# Patient Record
Sex: FEMALE | Race: BLACK OR AFRICAN AMERICAN | NOT HISPANIC OR LATINO | URBAN - METROPOLITAN AREA
[De-identification: names, ages, dates, MRNs, and addresses within clinical notes are randomized per-mention and may not be internally consistent; named-entity substitution may affect disease eponyms.]

---

## 2024-03-01 ENCOUNTER — INPATIENT (INPATIENT)
Facility: HOSPITAL | Age: 64
LOS: 2 days | Discharge: NURSING, REHAB OR LONG-TERM CARE | DRG: 64 | End: 2024-03-04
Attending: HOSPITALIST | Admitting: HOSPITALIST
Payer: MEDICAID

## 2024-03-01 ENCOUNTER — OUTPATIENT (OUTPATIENT)
Dept: OUTPATIENT SERVICES | Facility: HOSPITAL | Age: 64
LOS: 1 days | End: 2024-03-01
Payer: COMMERCIAL

## 2024-03-01 VITALS
WEIGHT: 93.7 LBS | RESPIRATION RATE: 18 BRPM | SYSTOLIC BLOOD PRESSURE: 145 MMHG | DIASTOLIC BLOOD PRESSURE: 74 MMHG | HEART RATE: 76 BPM | HEIGHT: 59 IN | TEMPERATURE: 98 F | OXYGEN SATURATION: 99 %

## 2024-03-01 DIAGNOSIS — I63.9 CEREBRAL INFARCTION, UNSPECIFIED: ICD-10-CM

## 2024-03-01 LAB
ALBUMIN SERPL ELPH-MCNC: 4.4 G/DL — SIGNIFICANT CHANGE UP (ref 3.3–5)
ALP SERPL-CCNC: 44 U/L — SIGNIFICANT CHANGE UP (ref 30–120)
ALT FLD-CCNC: 24 U/L — SIGNIFICANT CHANGE UP (ref 10–60)
ANION GAP SERPL CALC-SCNC: 9 MMOL/L — SIGNIFICANT CHANGE UP (ref 5–17)
APTT BLD: 34.8 SEC — SIGNIFICANT CHANGE UP (ref 24.5–35.6)
AST SERPL-CCNC: 17 U/L — SIGNIFICANT CHANGE UP (ref 10–40)
BASOPHILS # BLD AUTO: 0.04 K/UL — SIGNIFICANT CHANGE UP (ref 0–0.2)
BASOPHILS NFR BLD AUTO: 0.4 % — SIGNIFICANT CHANGE UP (ref 0–2)
BILIRUB SERPL-MCNC: 0.8 MG/DL — SIGNIFICANT CHANGE UP (ref 0.2–1.2)
BUN SERPL-MCNC: 15 MG/DL — SIGNIFICANT CHANGE UP (ref 7–23)
CALCIUM SERPL-MCNC: 10.1 MG/DL — SIGNIFICANT CHANGE UP (ref 8.4–10.5)
CHLORIDE SERPL-SCNC: 100 MMOL/L — SIGNIFICANT CHANGE UP (ref 96–108)
CO2 SERPL-SCNC: 28 MMOL/L — SIGNIFICANT CHANGE UP (ref 22–31)
CREAT SERPL-MCNC: 0.86 MG/DL — SIGNIFICANT CHANGE UP (ref 0.5–1.3)
EGFR: 76 ML/MIN/1.73M2 — SIGNIFICANT CHANGE UP
EOSINOPHIL # BLD AUTO: 0.04 K/UL — SIGNIFICANT CHANGE UP (ref 0–0.5)
EOSINOPHIL NFR BLD AUTO: 0.4 % — SIGNIFICANT CHANGE UP (ref 0–6)
GLUCOSE BLDC GLUCOMTR-MCNC: 101 MG/DL — HIGH (ref 70–99)
GLUCOSE BLDC GLUCOMTR-MCNC: 86 MG/DL — SIGNIFICANT CHANGE UP (ref 70–99)
GLUCOSE BLDC GLUCOMTR-MCNC: 93 MG/DL — SIGNIFICANT CHANGE UP (ref 70–99)
GLUCOSE BLDC GLUCOMTR-MCNC: 97 MG/DL — SIGNIFICANT CHANGE UP (ref 70–99)
GLUCOSE SERPL-MCNC: 158 MG/DL — HIGH (ref 70–99)
HCT VFR BLD CALC: 36.5 % — SIGNIFICANT CHANGE UP (ref 34.5–45)
HGB BLD-MCNC: 11.9 G/DL — SIGNIFICANT CHANGE UP (ref 11.5–15.5)
IMM GRANULOCYTES NFR BLD AUTO: 0.3 % — SIGNIFICANT CHANGE UP (ref 0–0.9)
INR BLD: 1.07 RATIO — SIGNIFICANT CHANGE UP (ref 0.85–1.18)
LYMPHOCYTES # BLD AUTO: 27.4 % — SIGNIFICANT CHANGE UP (ref 13–44)
LYMPHOCYTES # BLD AUTO: 3.1 K/UL — SIGNIFICANT CHANGE UP (ref 1–3.3)
MCHC RBC-ENTMCNC: 29.2 PG — SIGNIFICANT CHANGE UP (ref 27–34)
MCHC RBC-ENTMCNC: 32.6 GM/DL — SIGNIFICANT CHANGE UP (ref 32–36)
MCV RBC AUTO: 89.7 FL — SIGNIFICANT CHANGE UP (ref 80–100)
MONOCYTES # BLD AUTO: 0.62 K/UL — SIGNIFICANT CHANGE UP (ref 0–0.9)
MONOCYTES NFR BLD AUTO: 5.5 % — SIGNIFICANT CHANGE UP (ref 2–14)
NEUTROPHILS # BLD AUTO: 7.49 K/UL — HIGH (ref 1.8–7.4)
NEUTROPHILS NFR BLD AUTO: 66 % — SIGNIFICANT CHANGE UP (ref 43–77)
NRBC # BLD: 0 /100 WBCS — SIGNIFICANT CHANGE UP (ref 0–0)
PLATELET # BLD AUTO: 265 K/UL — SIGNIFICANT CHANGE UP (ref 150–400)
POTASSIUM SERPL-MCNC: 3.7 MMOL/L — SIGNIFICANT CHANGE UP (ref 3.5–5.3)
POTASSIUM SERPL-SCNC: 3.7 MMOL/L — SIGNIFICANT CHANGE UP (ref 3.5–5.3)
PROT SERPL-MCNC: 7.9 G/DL — SIGNIFICANT CHANGE UP (ref 6–8.3)
PROTHROM AB SERPL-ACNC: 11.6 SEC — SIGNIFICANT CHANGE UP (ref 9.5–13)
RBC # BLD: 4.07 M/UL — SIGNIFICANT CHANGE UP (ref 3.8–5.2)
RBC # FLD: 12 % — SIGNIFICANT CHANGE UP (ref 10.3–14.5)
SODIUM SERPL-SCNC: 137 MMOL/L — SIGNIFICANT CHANGE UP (ref 135–145)
TROPONIN I, HIGH SENSITIVITY RESULT: 7.2 NG/L — SIGNIFICANT CHANGE UP
WBC # BLD: 11.32 K/UL — HIGH (ref 3.8–10.5)
WBC # FLD AUTO: 11.32 K/UL — HIGH (ref 3.8–10.5)

## 2024-03-01 PROCEDURE — 99285 EMERGENCY DEPT VISIT HI MDM: CPT

## 2024-03-01 PROCEDURE — 70551 MRI BRAIN STEM W/O DYE: CPT

## 2024-03-01 PROCEDURE — 70551 MRI BRAIN STEM W/O DYE: CPT | Mod: 26

## 2024-03-01 PROCEDURE — 70498 CT ANGIOGRAPHY NECK: CPT | Mod: 26,MC

## 2024-03-01 PROCEDURE — 99223 1ST HOSP IP/OBS HIGH 75: CPT

## 2024-03-01 PROCEDURE — 93306 TTE W/DOPPLER COMPLETE: CPT | Mod: 26

## 2024-03-01 PROCEDURE — 93010 ELECTROCARDIOGRAM REPORT: CPT

## 2024-03-01 PROCEDURE — 70496 CT ANGIOGRAPHY HEAD: CPT | Mod: 26,MC

## 2024-03-01 PROCEDURE — 70450 CT HEAD/BRAIN W/O DYE: CPT | Mod: 26,59,MC

## 2024-03-01 PROCEDURE — 71045 X-RAY EXAM CHEST 1 VIEW: CPT | Mod: 26

## 2024-03-01 RX ORDER — ATORVASTATIN CALCIUM 80 MG/1
40 TABLET, FILM COATED ORAL AT BEDTIME
Refills: 0 | Status: DISCONTINUED | OUTPATIENT
Start: 2024-03-01 | End: 2024-03-01

## 2024-03-01 RX ORDER — ONDANSETRON 8 MG/1
4 TABLET, FILM COATED ORAL EVERY 8 HOURS
Refills: 0 | Status: DISCONTINUED | OUTPATIENT
Start: 2024-03-01 | End: 2024-03-04

## 2024-03-01 RX ORDER — CLOPIDOGREL BISULFATE 75 MG/1
75 TABLET, FILM COATED ORAL DAILY
Refills: 0 | Status: DISCONTINUED | OUTPATIENT
Start: 2024-03-01 | End: 2024-03-04

## 2024-03-01 RX ORDER — ACETAMINOPHEN 500 MG
650 TABLET ORAL EVERY 6 HOURS
Refills: 0 | Status: DISCONTINUED | OUTPATIENT
Start: 2024-03-01 | End: 2024-03-04

## 2024-03-01 RX ORDER — DEXTROSE 50 % IN WATER 50 %
15 SYRINGE (ML) INTRAVENOUS ONCE
Refills: 0 | Status: DISCONTINUED | OUTPATIENT
Start: 2024-03-01 | End: 2024-03-04

## 2024-03-01 RX ORDER — SODIUM CHLORIDE 9 MG/ML
1000 INJECTION, SOLUTION INTRAVENOUS
Refills: 0 | Status: DISCONTINUED | OUTPATIENT
Start: 2024-03-01 | End: 2024-03-02

## 2024-03-01 RX ORDER — INSULIN LISPRO 100/ML
VIAL (ML) SUBCUTANEOUS
Refills: 0 | Status: DISCONTINUED | OUTPATIENT
Start: 2024-03-01 | End: 2024-03-04

## 2024-03-01 RX ORDER — DEXTROSE 50 % IN WATER 50 %
25 SYRINGE (ML) INTRAVENOUS ONCE
Refills: 0 | Status: DISCONTINUED | OUTPATIENT
Start: 2024-03-01 | End: 2024-03-04

## 2024-03-01 RX ORDER — GLUCAGON INJECTION, SOLUTION 0.5 MG/.1ML
1 INJECTION, SOLUTION SUBCUTANEOUS ONCE
Refills: 0 | Status: DISCONTINUED | OUTPATIENT
Start: 2024-03-01 | End: 2024-03-04

## 2024-03-01 RX ORDER — ENOXAPARIN SODIUM 100 MG/ML
40 INJECTION SUBCUTANEOUS EVERY 24 HOURS
Refills: 0 | Status: DISCONTINUED | OUTPATIENT
Start: 2024-03-01 | End: 2024-03-04

## 2024-03-01 RX ORDER — LANOLIN ALCOHOL/MO/W.PET/CERES
3 CREAM (GRAM) TOPICAL AT BEDTIME
Refills: 0 | Status: DISCONTINUED | OUTPATIENT
Start: 2024-03-01 | End: 2024-03-04

## 2024-03-01 RX ORDER — ATORVASTATIN CALCIUM 80 MG/1
80 TABLET, FILM COATED ORAL AT BEDTIME
Refills: 0 | Status: DISCONTINUED | OUTPATIENT
Start: 2024-03-01 | End: 2024-03-01

## 2024-03-01 RX ORDER — ASPIRIN/CALCIUM CARB/MAGNESIUM 324 MG
81 TABLET ORAL DAILY
Refills: 0 | Status: DISCONTINUED | OUTPATIENT
Start: 2024-03-01 | End: 2024-03-04

## 2024-03-01 RX ORDER — ATORVASTATIN CALCIUM 80 MG/1
80 TABLET, FILM COATED ORAL AT BEDTIME
Refills: 0 | Status: DISCONTINUED | OUTPATIENT
Start: 2024-03-01 | End: 2024-03-04

## 2024-03-01 RX ADMIN — SODIUM CHLORIDE 50 MILLILITER(S): 9 INJECTION, SOLUTION INTRAVENOUS at 21:00

## 2024-03-01 RX ADMIN — ENOXAPARIN SODIUM 40 MILLIGRAM(S): 100 INJECTION SUBCUTANEOUS at 20:30

## 2024-03-01 NOTE — ED ADULT NURSE NOTE - ALCOHOL PRE SCREEN (AUDIT - C)
Statement Selected
labs and imaging reviewed: spoke with Dr. Segundo   patient currently on BIPAP 02 saturation at 96%, stable at this time  patient to be admitted to medicine under Dr. Segundo  discussed with Dr. Griffin

## 2024-03-01 NOTE — ED ADULT NURSE NOTE - FINAL NURSING ELECTRONIC SIGNATURE
Quality 265: Biopsy Follow-Up: Biopsy results reviewed, communicated, tracked, and documented
Quality 110: Preventive Care And Screening: Influenza Immunization: Influenza Immunization not Administered because Patient Refused.
Detail Level: Detailed
01-Mar-2024 08:39

## 2024-03-01 NOTE — PATIENT PROFILE ADULT - FALL HARM RISK - HARM RISK INTERVENTIONS
Assistance with ambulation/Assistance OOB with selected safe patient handling equipment/Communicate Risk of Fall with Harm to all staff/Discuss with provider need for PT consult/Monitor gait and stability/Reinforce activity limits and safety measures with patient and family/Tailored Fall Risk Interventions/Visual Cue: Yellow wristband and red socks/Bed in lowest position, wheels locked, appropriate side rails in place/Call bell, personal items and telephone in reach/Instruct patient to call for assistance before getting out of bed or chair/Non-slip footwear when patient is out of bed/[place] to call system/Physically safe environment - no spills, clutter or unnecessary equipment/Purposeful Proactive Rounding/Room/bathroom lighting operational, light cord in reach

## 2024-03-01 NOTE — ED PROVIDER NOTE - CLINICAL SUMMARY MEDICAL DECISION MAKING FREE TEXT BOX
Patient presents to emergency department after a fall at home.  By history patient had likely CVA 5 days ago.  No medical care rendered at that time or since.  Patient has no obvious sign of injury but does show signs of neurologic weakness on the right side with an expressive aphasia.  Will get CAT scans and labs.

## 2024-03-01 NOTE — DISCHARGE NOTE NURSING/CASE MANAGEMENT/SOCIAL WORK - PATIENT PORTAL LINK FT
You can access the FollowMyHealth Patient Portal offered by [facility] by registering at the following website: [email or web address]. By joining [facility]’s FollowMyHealth portal, you will also be able to view your health information using other applications (apps) compatible with our system.

## 2024-03-01 NOTE — ED ADULT TRIAGE NOTE - GLASGOW COMA SCALE: BEST VERBAL RESPONSE, MLM
Person calling (if not the patient, is medical info able to be given?): Valeria-patient     Callback phone number: [phone]    Permission to leave detailed message:no    Detailed reason for call: Patient called back requesting to speak to Toma woodward. Patient wants to know if she can take over the counter medication for urinary track infection.   
Person calling Valeria-patient  Callback phone number work [phone]  Permission to leave detailed message: call back info  Detailed reason for call: Patient recently had chemo -steroids-blood sugar got high-she has a infection-can something be called in? She uses [facility]-  
Pt states has no burning \"yet\" but does have urinary frequency with very little urine.  DR. Santacruz is aware and orders sent to [facility].  Pt made aware that she needs to leave a urine sample before starting any   Antibiotics.  Pt agreed and states that she works until 2200 tonite and will leave urine sample in am and then start Macrobid.  Instructed pt to call with any questions or concerns, pt agreed.  
(V2) incomprehensible speech

## 2024-03-01 NOTE — CARE COORDINATION ASSESSMENT. - NSPASTMEDSURGHISTORY_GEN_ALL_CORE_FT
PAST MEDICAL & SURGICAL HISTORY:  CVA (cerebrovascular accident)      DM (diabetes mellitus)      HTN (hypertension)      No significant past surgical history

## 2024-03-01 NOTE — ED PROVIDER NOTE - OBJECTIVE STATEMENT
Patient brought into the hospital after a fall at home.  According to patient's partner the patient became less communicative 5 days ago.  Patient initially had a period of staring and was nonverbal 5 days ago.  After that she started to respond with single word answers.  Partner also noted a right facial droop.  Partner states that yesterday she did notice some right arm weakness as well.  This morning partner heard a fall and found her lying next to the bed.  No injury noted though partner feels she is using her right arm less than she was before.  Patient was not taken to the hospital 5 days ago when her symptoms started as patient reportedly had a bad experience at the hospital before and refused to go. Patient had a CVA about 4-5 months ago, but partner says she had no residual deficits

## 2024-03-01 NOTE — ED PROVIDER NOTE - CPE EDP SKIN NORM
----- Message from FLORESITA Paz sent at 6/13/2022  8:24 AM CDT -----  Looks like patient is covered w/ bactrim and has f/u w/ urology this week.   ----- Message -----  From: Tj De Dios MD  Sent: 6/10/2022   1:23 PM CDT  To: FLORESITA Paz    Resistant to [employer]. Please address  ----- Message -----  From: Lab, Background User  Sent: 6/8/2022   9:50 PM CDT  To: Tj De Dios MD         normal...

## 2024-03-01 NOTE — H&P ADULT - NSHPPHYSICALEXAM_GEN_ALL_CORE
Appears weak, frail  PERRLA   S1, S2 present, no obvious murmurs or rubs  Lungs CTA BL  R sided facial droop and right hemiparesis  Opens eyes to verbal stimuli otherwise not conversational   No LE edema

## 2024-03-01 NOTE — ED PROVIDER NOTE - PROGRESS NOTE DETAILS
Discussed with Dr. Enrique (attending neurologist) he recommends admission for MRI Discussed with Dr. Thayer (attending hospitalist) she will admit pt

## 2024-03-01 NOTE — ED PROVIDER NOTE - NSTIMEPROVIDERCAREINITIATE_GEN_ER
"Cc:  Postop TLH    HPI:  Pt had uncomplicated TLH on 9/30/20.  Pt doing well and denies any c/o currently.  Pt happy since hyst.  Pt has not been sexually active yet.    Vitals:    11/13/20 1448   Weight: 89.8 kg (198 lb)   Height: 5' 3" (1.6 m)     Abd:  Soft NTND, trocar sites fully healed  Pelvic:  Vaginal mucosa:  Pink, normal physiologic d/c; cuff well healed; no pain with bimanual    Assessment/Plan  Postop check - pt may resume full activity without restrictions    "
Asthma exacerbation
01-Mar-2024 05:40

## 2024-03-01 NOTE — CARE COORDINATION ASSESSMENT. - NSCAREPROVIDERS_GEN_ALL_CORE_FT
CARE PROVIDERS:  Accepting Physician: AMBROSIO Thayer  Access Services: Nadiya Basurto  Administration: Charla Fernando  Administration: Darnell Spivey  Administration: Zoey Means  Admitting: AMBROSIO Thayer  Attending: AMBROSIO Thayer  Case Management: Santaromana, Anna  ED Attending: Siva Rudolph  ED Nurse: Derick Barnes  Emergency Medicine: Jean Marie Chao  Emergency Medicine: Stephanie Vinson  Nurse: Stevie Vaughn  Nurse: Traci Milan  Nurse: Derick Barnes  Nurse: Jovita Armas  Nurse: Bibiana Gallardo  Nurse: Valentino, Samantha  Occupational Therapy: Cherie Coffman  Outpatient Provider: David Enrique  Override: Jovita Armas  Override: Valentino, Samantha  Physical Therapy: Nicole Mccullough  Physical Therapy: Tab Carias  Quality Review: Rae Echevarria  Radiology Technician: Jessica Hernandez  Registered Dietitian: Silvina Connelly  : Sj Reyes  Team: CAITLYN CORTEZ Hospitalists, Team  UR// Supp. Assoc.: Mitra Donahue  UR// Supp. Assoc.: Ash Landaverde

## 2024-03-01 NOTE — CONSULT NOTE ADULT - SUBJECTIVE AND OBJECTIVE BOX
Clinical impression is most likely left hemispheric cerebrovascular accident.    telemetry evaluation to rule out underlying arrhythmia.  echocardiogram to evaluate ejection fraction.  MRI brain.  I would recommend to check TSH,lipid panel  and A1C  Avoid hypotension, allow permissive hypertension, keep SBP above 150 and below 190 if possible   Head of bed is optimally elevated to 15 degrees.  Would recommend to avoid half normal and D5 fluids, would recommend if necessary use only normal saline.   Physical Therapy and Occupational Therapy.  I would recommend fall precautions.  Aspirin 81 PO (ASA 300MG AK daily if patient fails dysphagia screen) and clopidogrel 75mg for 3 months, followed by ASA monotherapy (subject to change based on mechanism and test findings)   Atorvastatin 80MG QHS, titrate to LDL<70aspirin 81 mg once a day  Speech and Swallow evaluation.  Fall precautions.  spoke to HCP Connie

## 2024-03-01 NOTE — ED ADULT NURSE NOTE - ED STAT RN HAND OFF
SUBJECTIVE:  Kaycee is a  50 year old   female who presents today for annual checkup..      PAST MEDICAL HISTORY:    Past Medical History:   Diagnosis Date   • NO HX OF     CA, HTN, DM, CAD, CVA, DVT, liver disease, migraine   • Panic disorder without agoraphobia        PAST SURGICAL HISTORY:   Past Surgical History:   Procedure Laterality Date   • Past surgical history      No operations       SOCIAL HISTORY:   Social History     Tobacco Use   • Smoking status: Never Smoker   • Smokeless tobacco: Never Used   Substance Use Topics   • Alcohol use: Yes     Comment: rarely       FAMILY HISTORY:    Family History   Problem Relation Age of Onset   • Diabetes Maternal Grandmother    • Heart Maternal Grandmother    • Diabetes Paternal Grandfather    • Stroke Maternal Grandfather    • Asthma Mother    • Hypertension Father        MEDICATIONS:   Current Outpatient Medications   Medication Sig Dispense Refill   • norethindrone-ethinyl estradiol (Alyacen ) 1-35 MG-MCG per tablet Take 1 tablet by mouth daily. 84 tablet 4   • fluoxetine (PROZAC) 40 MG capsule Take 40 mg by mouth daily.     • Loratadine (CLARITIN PO)        No current facility-administered medications for this visit.       ALLERGIES:    Allergies as of 2021   • (No Known Allergies)       OBSTETRIC/GYNECOLOGIC HISTORY:   OB History    Para Term  AB Living   0 0 0 0 0 0   SAB TAB Ectopic Molar Multiple Live Births   0 0 0 0 0 0    Patient's last menstrual period was 2021. Menstrual history includes regular. Patient Denies history of sexually transmitted diseases; has no history of abnormal PAP.  Present form of contraception is birth control pills          OBJECTIVE:  Visit Vitals  /80 (BP Location: LUE - Left upper extremity, Patient Position: Sitting, Cuff Size: Regular)   Pulse 80   Ht 5' 5\" (1.651 m)   Wt 78.2 kg (172 lb 6.4 oz)   LMP 2021   BMI 28.69 kg/m²     NECK: Supple  LUNGS: clear to  auscultation  HEART: Regular rate, regular rhythm  BREASTS: no dominant or suspicious mass  ABDOMEN: Benign, Soft, flat, non-tender, No masses, organomegaly  EXTREMITIES: Normal  SKIN:Normal    PELVIC EXAM:Performed with medium katerine.  Vulva: no lesions or masses noted and no erythema or discharge  Vagina:normal size & caliber  Cervix:Normal in appearance, no lesions or masses and no discharge or bleeding. No cervical motion tenderness  Uterus: firm, non-tender, normal size, normal shape  Adnexa:non-tender, no fullness noted and no masses noted  RECTAL: sphincter intact    ASSESSMENT  > Essentially normal well woman exam.    PLAN  > Pap not done  > Reinforced Self-Breast Exam.  > Will return to clinic in 1 year and will call with any questions/concerns prior to that time.  > refill birth control pills  > Cologuard   Handoff

## 2024-03-01 NOTE — ED ADULT NURSE NOTE - OBJECTIVE STATEMENT
63yr old female BIB significant other for stroke that happened on Sunday; pt recently diagnosed with Stroke in November; was seen In [facility]; pt fell yesterday around 2230; unwitnessed; significant other heard a "thump" and found patient on the floor in the bedroom; pt was on the bed prior to the fall; pt able to follow some commands while unable to follow other commands; pt has not been verbal since her arrival to ED; s/o feels pt symptoms of right arm weakness has worsened since Sunday; Pt was still able to ambulate at home as per s/o who is also her proxy; no written proxy form presented; unable to assess pts orientation

## 2024-03-01 NOTE — DISCHARGE NOTE NURSING/CASE MANAGEMENT/SOCIAL WORK - NSDCPEFALRISK_GEN_ALL_CORE
For information on Fall & Injury Prevention, visit: [email or web address] OR  [email or web address] OR  https://www.cdc.gov/steadi/patient.html

## 2024-03-01 NOTE — ED ADULT NURSE NOTE - NSFALLHARMRISKINTERV_ED_ALL_ED
Assistance OOB with selected safe patient handling equipment if applicable/Assistance with ambulation/Communicate risk of Fall with Harm to all staff, patient, and family/Monitor gait and stability/Provide visual cue: red socks, yellow wristband, yellow gown, etc/Reinforce activity limits and safety measures with patient and family/Bed in lowest position, wheels locked, appropriate side rails in place/Call bell, personal items and telephone in reach/Instruct patient to call for assistance before getting out of bed/chair/stretcher/Non-slip footwear applied when patient is off stretcher/[place] to call system/Physically safe environment - no spills, clutter or unnecessary equipment/Purposeful Proactive Rounding/Room/bathroom lighting operational, light cord in reach

## 2024-03-01 NOTE — H&P ADULT - ASSESSMENT
63F with HTN, HLD, DM2, CVA (10/2023) presents with right sided facial droop, r hemiparesis, aphasia that started 5 days ago and fall     1. Left MCA distribution acute CVA  MRI brain demonstrates multiple small acute infarctions in the left MCA distribution and chronic ischemic changes from prior strokes.   CTA head/neck: No LVO occlusion   Plan:  ASA 81mg and plavix 75mg x3 months followed by monotherapy   Atorvastatin 80mg   Telemetry monitoring, 2D echo, lipid panel, AIC, TSH, speech and swallow eval, PT/OT eval   Permissive hypertension   Glycemic control goal Bs <180mg/dL  Aspiration, fall precautions     2. DM2  Hold home metformin 500mg BID  AIC, correction scale insulin   Depending on insulin need and if able to tolerato, coverage can be started accordingly     3. HTN   Hold home lisinopril 20mg, allow permissive hypertension <190mmHg     4. HLD   Statin       VTE ppx: Lovenox     Time spent 55mins reviewing notes, labs data/ imaging , discussion with multidisciplinary team.

## 2024-03-01 NOTE — H&P ADULT - HISTORY OF PRESENT ILLNESS
63F with HTN, HLD, DM2, CVA (10/2023) without residual neuro deficits presents following a fall at home.    According to the partner, Connie the patient was in her usual state of health up until 5 days ago.   On Sunday, pt was noted to have right sided facial, arm and leg weakness, aphasia and episodes of confusion. She took ASA 81mg that she takes on a regular basis.   Symptoms improved and pt was able to ambulate and function at home but felt weak.   Yesterday, the patient fell prompting her partner to bring her to the ED.   On arrival, pt noted to have dense right sided facial droop and right sided hemiparesis and aphasia.   NIHSS 8

## 2024-03-01 NOTE — CARE COORDINATION ASSESSMENT. - ASSESSMENT CONCERNS TO BE ADDRESSED
Pt is a 63 year old female who was BIB her s/o for stroke like symtpoms.  Pt lives home with s/o.  S/O at bedside.  She reports that patient had a stroke in Nov 2023 and then she thinks patient had one on Sunday because she had slurred speech and facial drooping, however they did not go to hospital at that time because patient had a bad experience during last admission.  Pt then fell last night and s/o brought her to [facility] because she was concerned she hurt herself and patient could not say what happened.  S/O is HCP.  PCP is Dr Radha Allen in [place] and pharmact is [facility] in [place].  2/3 steps to enter home and 13 steps inside.  SW to follow for PTE and needs.  S/O provided with acute and ERICH lists./care coordination

## 2024-03-02 LAB
A1C WITH ESTIMATED AVERAGE GLUCOSE RESULT: 5.5 % — SIGNIFICANT CHANGE UP (ref 4–5.6)
ANION GAP SERPL CALC-SCNC: 12 MMOL/L — SIGNIFICANT CHANGE UP (ref 5–17)
BUN SERPL-MCNC: 10 MG/DL — SIGNIFICANT CHANGE UP (ref 7–23)
CALCIUM SERPL-MCNC: 9.5 MG/DL — SIGNIFICANT CHANGE UP (ref 8.4–10.5)
CHLORIDE SERPL-SCNC: 103 MMOL/L — SIGNIFICANT CHANGE UP (ref 96–108)
CHOLEST SERPL-MCNC: 165 MG/DL — SIGNIFICANT CHANGE UP
CO2 SERPL-SCNC: 25 MMOL/L — SIGNIFICANT CHANGE UP (ref 22–31)
CREAT SERPL-MCNC: 0.69 MG/DL — SIGNIFICANT CHANGE UP (ref 0.5–1.3)
EGFR: 97 ML/MIN/1.73M2 — SIGNIFICANT CHANGE UP
ESTIMATED AVERAGE GLUCOSE: 111 MG/DL — SIGNIFICANT CHANGE UP (ref 68–114)
GLUCOSE BLDC GLUCOMTR-MCNC: 102 MG/DL — HIGH (ref 70–99)
GLUCOSE BLDC GLUCOMTR-MCNC: 102 MG/DL — HIGH (ref 70–99)
GLUCOSE BLDC GLUCOMTR-MCNC: 114 MG/DL — HIGH (ref 70–99)
GLUCOSE BLDC GLUCOMTR-MCNC: 131 MG/DL — HIGH (ref 70–99)
GLUCOSE SERPL-MCNC: 104 MG/DL — HIGH (ref 70–99)
HCT VFR BLD CALC: 37.7 % — SIGNIFICANT CHANGE UP (ref 34.5–45)
HCV AB S/CO SERPL IA: 0.09 S/CO — SIGNIFICANT CHANGE UP (ref 0–0.99)
HCV AB SERPL-IMP: SIGNIFICANT CHANGE UP
HDLC SERPL-MCNC: 54 MG/DL — SIGNIFICANT CHANGE UP
HGB BLD-MCNC: 12.1 G/DL — SIGNIFICANT CHANGE UP (ref 11.5–15.5)
LIPID PNL WITH DIRECT LDL SERPL: 93 MG/DL — SIGNIFICANT CHANGE UP
MCHC RBC-ENTMCNC: 28.5 PG — SIGNIFICANT CHANGE UP (ref 27–34)
MCHC RBC-ENTMCNC: 32.1 GM/DL — SIGNIFICANT CHANGE UP (ref 32–36)
MCV RBC AUTO: 88.9 FL — SIGNIFICANT CHANGE UP (ref 80–100)
NON HDL CHOLESTEROL: 110 MG/DL — SIGNIFICANT CHANGE UP
NRBC # BLD: 0 /100 WBCS — SIGNIFICANT CHANGE UP (ref 0–0)
PLATELET # BLD AUTO: 144 K/UL — LOW (ref 150–400)
POTASSIUM SERPL-MCNC: 3.7 MMOL/L — SIGNIFICANT CHANGE UP (ref 3.5–5.3)
POTASSIUM SERPL-SCNC: 3.7 MMOL/L — SIGNIFICANT CHANGE UP (ref 3.5–5.3)
RBC # BLD: 4.24 M/UL — SIGNIFICANT CHANGE UP (ref 3.8–5.2)
RBC # FLD: 12 % — SIGNIFICANT CHANGE UP (ref 10.3–14.5)
SODIUM SERPL-SCNC: 140 MMOL/L — SIGNIFICANT CHANGE UP (ref 135–145)
TRIGL SERPL-MCNC: 92 MG/DL — SIGNIFICANT CHANGE UP
TSH SERPL-MCNC: 0.41 UIU/ML — SIGNIFICANT CHANGE UP (ref 0.27–4.2)
WBC # BLD: 9.18 K/UL — SIGNIFICANT CHANGE UP (ref 3.8–10.5)
WBC # FLD AUTO: 9.18 K/UL — SIGNIFICANT CHANGE UP (ref 3.8–10.5)

## 2024-03-02 PROCEDURE — 99232 SBSQ HOSP IP/OBS MODERATE 35: CPT

## 2024-03-02 RX ORDER — LISINOPRIL 2.5 MG/1
20 TABLET ORAL DAILY
Refills: 0 | Status: DISCONTINUED | OUTPATIENT
Start: 2024-03-02 | End: 2024-03-04

## 2024-03-02 RX ORDER — HYDRALAZINE HCL 50 MG
10 TABLET ORAL EVERY 6 HOURS
Refills: 0 | Status: DISCONTINUED | OUTPATIENT
Start: 2024-03-02 | End: 2024-03-03

## 2024-03-02 RX ORDER — POLYETHYLENE GLYCOL 3350 17 G/17G
17 POWDER, FOR SOLUTION ORAL ONCE
Refills: 0 | Status: COMPLETED | OUTPATIENT
Start: 2024-03-02 | End: 2024-03-02

## 2024-03-02 RX ORDER — HYDRALAZINE HCL 50 MG
25 TABLET ORAL ONCE
Refills: 0 | Status: COMPLETED | OUTPATIENT
Start: 2024-03-02 | End: 2024-03-02

## 2024-03-02 RX ADMIN — ENOXAPARIN SODIUM 40 MILLIGRAM(S): 100 INJECTION SUBCUTANEOUS at 20:37

## 2024-03-02 RX ADMIN — POLYETHYLENE GLYCOL 3350 17 GRAM(S): 17 POWDER, FOR SOLUTION ORAL at 19:00

## 2024-03-02 RX ADMIN — SODIUM CHLORIDE 50 MILLILITER(S): 9 INJECTION, SOLUTION INTRAVENOUS at 08:03

## 2024-03-02 RX ADMIN — ATORVASTATIN CALCIUM 80 MILLIGRAM(S): 80 TABLET, FILM COATED ORAL at 20:37

## 2024-03-02 RX ADMIN — Medication 25 MILLIGRAM(S): at 21:31

## 2024-03-02 RX ADMIN — LISINOPRIL 20 MILLIGRAM(S): 2.5 TABLET ORAL at 19:00

## 2024-03-02 RX ADMIN — Medication 81 MILLIGRAM(S): at 12:55

## 2024-03-02 RX ADMIN — CLOPIDOGREL BISULFATE 75 MILLIGRAM(S): 75 TABLET, FILM COATED ORAL at 12:53

## 2024-03-02 NOTE — PROVIDER CONTACT NOTE (MEDICATION) - BACKGROUND
admitted on 3/1 for CVA that occurred on 2/25. History of previous stroke, HTN, DM.
Presented to ED this am with CVA and R sided weakness, including aphasia and facial droop. History of DM and previous stroke (2023).

## 2024-03-02 NOTE — PROGRESS NOTE ADULT - SUBJECTIVE AND OBJECTIVE BOX
Patient is a 63y old  Female who presents with a chief complaint of Right sided droop (02 Mar 2024 07:29)        HPI:  63F with HTN, HLD, DM2, CVA (10/2023) without residual neuro deficits presents following a fall at home.    According to the partner, Connie the patient was in her usual state of health up until 5 days ago.   On Sunday, pt was noted to have right sided facial, arm and leg weakness, aphasia and episodes of confusion. She took ASA 81mg that she takes on a regular basis.   Symptoms improved and pt was able to ambulate and function at home but felt weak.   Yesterday, the patient fell prompting her partner to bring her to the ED.   On arrival, pt noted to have dense right sided facial droop and right sided hemiparesis and aphasia.   NIHSS 8 (01 Mar 2024 13:11)      SUBJECTIVE & OBJECTIVE: Pt seen and examined at bedside. nad    PHYSICAL EXAM:  T(C): 36.8 (03-02-24 @ 07:56), Max: 36.9 (03-01-24 @ 15:45)  HR: 58 (03-02-24 @ 07:56) (57 - 78)  BP: 151/82 (03-02-24 @ 07:56) (139/69 - 151/82)  RR: 16 (03-02-24 @ 07:56) (16 - 16)  SpO2: 100% (03-02-24 @ 07:56) (98% - 100%)  Wt(kg): --   GENERAL: NAD, well-groomed, well-developed  HEAD:  Atraumatic, Normocephalic  EYES: EOMI, PERRLA, conjunctiva and sclera clear  ENMT: Moist mucous membranes  NECK: Supple, No JVD  NERVOUS SYSTEM:  Alert & Oriented X3, Motor Strength 5/5 B/L upper and lower extremities; DTRs 2+ intact and symmetric  CHEST/LUNG: Clear to auscultation bilaterally; No rales, rhonchi, wheezing, or rubs  HEART: Regular rate and rhythm; No murmurs, rubs, or gallops  ABDOMEN: Soft, Nontender, Nondistended; Bowel sounds present  EXTREMITIES:  2+ Peripheral Pulses, No clubbing, cyanosis, or edema        MEDICATIONS  (STANDING):  aspirin enteric coated 81 milliGRAM(s) Oral daily  atorvastatin 80 milliGRAM(s) Oral at bedtime  clopidogrel Tablet 75 milliGRAM(s) Oral daily  dextrose 5% + sodium chloride 0.9%. 1000 milliLiter(s) (50 mL/Hr) IV Continuous <Continuous>  dextrose 5%. 1000 milliLiter(s) (50 mL/Hr) IV Continuous <Continuous>  dextrose 50% Injectable 25 Gram(s) IV Push once  enoxaparin Injectable 40 milliGRAM(s) SubCutaneous every 24 hours  glucagon  Injectable 1 milliGRAM(s) IntraMuscular once  insulin lispro (ADMELOG) corrective regimen sliding scale   SubCutaneous three times a day before meals    MEDICATIONS  (PRN):  acetaminophen     Tablet .. 650 milliGRAM(s) Oral every 6 hours PRN Temp greater or equal to 38C (100.4F), Mild Pain (1 - 3)  aluminum hydroxide/magnesium hydroxide/simethicone Suspension 30 milliLiter(s) Oral every 4 hours PRN Dyspepsia  dextrose Oral Gel 15 Gram(s) Oral once PRN Blood Glucose LESS THAN 70 milliGRAM(s)/deciliter  melatonin 3 milliGRAM(s) Oral at bedtime PRN Insomnia  ondansetron Injectable 4 milliGRAM(s) IV Push every 8 hours PRN Nausea and/or Vomiting      LABS:                        12.1   9.18  )-----------( 144      ( 02 Mar 2024 06:25 )             37.7     03-02    140  |  103  |  10  ----------------------------<  104<H>  3.7   |  25  |  0.69    Ca    9.5      02 Mar 2024 06:25    TPro  7.9  /  Alb  4.4  /  TBili  0.8  /  DBili  x   /  AST  17  /  ALT  24  /  AlkPhos  44  03-01    PT/INR - ( 01 Mar 2024 05:44 )   PT: 11.6 sec;   INR: 1.07 ratio         PTT - ( 01 Mar 2024 05:44 )  PTT:34.8 sec  Urinalysis Basic - ( 02 Mar 2024 06:25 )    Color: x / Appearance: x / SG: x / pH: x  Gluc: 104 mg/dL / Ketone: x  / Bili: x / Urobili: x   Blood: x / Protein: x / Nitrite: x   Leuk Esterase: x / RBC: x / WBC x   Sq Epi: x / Non Sq Epi: x / Bacteria: x        CAPILLARY BLOOD GLUCOSE      POCT Blood Glucose.: 114 mg/dL (02 Mar 2024 06:05)  POCT Blood Glucose.: 101 mg/dL (01 Mar 2024 23:47)  POCT Blood Glucose.: 93 mg/dL (01 Mar 2024 21:05)  POCT Blood Glucose.: 86 mg/dL (01 Mar 2024 17:26)  POCT Blood Glucose.: 97 mg/dL (01 Mar 2024 13:08)      CAPILLARY BLOOD GLUCOSE      POCT Blood Glucose.: 114 mg/dL (02 Mar 2024 06:05)  POCT Blood Glucose.: 101 mg/dL (01 Mar 2024 23:47)  POCT Blood Glucose.: 93 mg/dL (01 Mar 2024 21:05)  POCT Blood Glucose.: 86 mg/dL (01 Mar 2024 17:26)  POCT Blood Glucose.: 97 mg/dL (01 Mar 2024 13:08)    CAPILLARY BLOOD GLUCOSE      POCT Blood Glucose.: 114 mg/dL (02 Mar 2024 06:05)            RECENT CULTURES:      RADIOLOGY & ADDITIONAL TESTS:                        DVT/GI ppx  Discussed with pt @ bedside

## 2024-03-02 NOTE — DIETITIAN NUTRITION RISK NOTIFICATION - FINDINGS BASED ON COMPREHENSIVE NUTRITION ASSESSMENT, CONSULTATION PERFORMED ON
02-Mar-2024 Quinolones Counseling:  I discussed with the patient the risks of fluoroquinolones including but not limited to GI upset, allergic reaction, drug rash, diarrhea, dizziness, photosensitivity, yeast infections, liver function test abnormalities, tendonitis/tendon rupture.
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
## 2024-03-02 NOTE — PROGRESS NOTE ADULT - SUBJECTIVE AND OBJECTIVE BOX
Neurology follow up note    MAY [id number]      Interval History:    Patient feels ok no new complaints.    Allergies    Allergy Status Unknown    Intolerances        MEDICATIONS    acetaminophen     Tablet .. 650 milliGRAM(s) Oral every 6 hours PRN  aluminum hydroxide/magnesium hydroxide/simethicone Suspension 30 milliLiter(s) Oral every 4 hours PRN  aspirin enteric coated 81 milliGRAM(s) Oral daily  atorvastatin 80 milliGRAM(s) Oral at bedtime  clopidogrel Tablet 75 milliGRAM(s) Oral daily  dextrose 5% + sodium chloride 0.9%. 1000 milliLiter(s) IV Continuous <Continuous>  dextrose 5%. 1000 milliLiter(s) IV Continuous <Continuous>  dextrose 50% Injectable 25 Gram(s) IV Push once  dextrose Oral Gel 15 Gram(s) Oral once PRN  enoxaparin Injectable 40 milliGRAM(s) SubCutaneous every 24 hours  glucagon  Injectable 1 milliGRAM(s) IntraMuscular once  insulin lispro (ADMELOG) corrective regimen sliding scale   SubCutaneous three times a day before meals  melatonin 3 milliGRAM(s) Oral at bedtime PRN  ondansetron Injectable 4 milliGRAM(s) IV Push every 8 hours PRN              Vital Signs Last 24 Hrs  T(C): 36.5 (01 Mar 2024 23:40), Max: 36.9 (01 Mar 2024 15:45)  T(F): 97.7 (01 Mar 2024 23:40), Max: 98.5 (01 Mar 2024 15:45)  HR: 57 (01 Mar 2024 23:40) (57 - 78)  BP: 139/69 (01 Mar 2024 23:40) (122/64 - 150/78)  BP(mean): 102 (01 Mar 2024 15:45) (102 - 102)  RR: 16 (01 Mar 2024 23:40) (16 - 18)  SpO2: 98% (01 Mar 2024 23:40) (98% - 100%)    Parameters below as of 01 Mar 2024 23:40  Patient On (Oxygen Delivery Method): room air                      LABS:  CBC Full  -  ( 02 Mar 2024 06:25 )  WBC Count : 9.18 K/uL  RBC Count : 4.24 M/uL  Hemoglobin : 12.1 g/dL  Hematocrit : 37.7 %  Platelet Count - Automated : 144 K/uL  Mean Cell Volume : 88.9 fl  Mean Cell Hemoglobin : 28.5 pg  Mean Cell Hemoglobin Concentration : 32.1 gm/dL  Auto Neutrophil # : x  Auto Lymphocyte # : x  Auto Monocyte # : x  Auto Eosinophil # : x  Auto Basophil # : x  Auto Neutrophil % : x  Auto Lymphocyte % : x  Auto Monocyte % : x  Auto Eosinophil % : x  Auto Basophil % : x    Urinalysis Basic - ( 02 Mar 2024 06:25 )    Color: x / Appearance: x / SG: x / pH: x  Gluc: 104 mg/dL / Ketone: x  / Bili: x / Urobili: x   Blood: x / Protein: x / Nitrite: x   Leuk Esterase: x / RBC: x / WBC x   Sq Epi: x / Non Sq Epi: x / Bacteria: x      03-02    140  |  103  |  10  ----------------------------<  104<H>  3.7   |  25  |  0.69    Ca    9.5      02 Mar 2024 06:25    TPro  7.9  /  Alb  4.4  /  TBili  0.8  /  DBili  x   /  AST  17  /  ALT  24  /  AlkPhos  44  03-01    Hemoglobin A1C:     LIVER FUNCTIONS - ( 01 Mar 2024 05:44 )  Alb: 4.4 g/dL / Pro: 7.9 g/dL / ALK PHOS: 44 U/L / ALT: 24 U/L / AST: 17 U/L / GGT: x           Vitamin B12   PT/INR - ( 01 Mar 2024 05:44 )   PT: 11.6 sec;   INR: 1.07 ratio         PTT - ( 01 Mar 2024 05:44 )  PTT:34.8 sec      RADIOLOGY        PATIENT HAS NOT YET BEEN SEEN AND EXAMINED TODAY. NOTE AND CHART REVIEWED IN AM AND EXAM FORM PREVIOUS.  ONCE PATIENT SEEN, CHART WILL BE UPDATE AT PRESENT NOTE IS INCOMPLETE     Neurology follow up note    MAY [id number]      Interval History:    Patient feels ok     Allergies    Allergy Status Unknown    Intolerances        MEDICATIONS    acetaminophen     Tablet .. 650 milliGRAM(s) Oral every 6 hours PRN  aluminum hydroxide/magnesium hydroxide/simethicone Suspension 30 milliLiter(s) Oral every 4 hours PRN  aspirin enteric coated 81 milliGRAM(s) Oral daily  atorvastatin 80 milliGRAM(s) Oral at bedtime  clopidogrel Tablet 75 milliGRAM(s) Oral daily  dextrose 5% + sodium chloride 0.9%. 1000 milliLiter(s) IV Continuous <Continuous>  dextrose 5%. 1000 milliLiter(s) IV Continuous <Continuous>  dextrose 50% Injectable 25 Gram(s) IV Push once  dextrose Oral Gel 15 Gram(s) Oral once PRN  enoxaparin Injectable 40 milliGRAM(s) SubCutaneous every 24 hours  glucagon  Injectable 1 milliGRAM(s) IntraMuscular once  insulin lispro (ADMELOG) corrective regimen sliding scale   SubCutaneous three times a day before meals  melatonin 3 milliGRAM(s) Oral at bedtime PRN  ondansetron Injectable 4 milliGRAM(s) IV Push every 8 hours PRN              Vital Signs Last 24 Hrs  T(C): 36.5 (01 Mar 2024 23:40), Max: 36.9 (01 Mar 2024 15:45)  T(F): 97.7 (01 Mar 2024 23:40), Max: 98.5 (01 Mar 2024 15:45)  HR: 57 (01 Mar 2024 23:40) (57 - 78)  BP: 139/69 (01 Mar 2024 23:40) (122/64 - 150/78)  BP(mean): 102 (01 Mar 2024 15:45) (102 - 102)  RR: 16 (01 Mar 2024 23:40) (16 - 18)  SpO2: 98% (01 Mar 2024 23:40) (98% - 100%)    Parameters below as of 01 Mar 2024 23:40  Patient On (Oxygen Delivery Method): room air      REVIEW OF SYSTEMS:  Could not be obtained secondary to the patient has expressive aphasia, but has been in normal state of health.  On Sunday, stopped speaking.  Last night, had an episode where she had fallen out of bed and also on Sunday had right facial drooping.      PHYSICAL EXAMINATION:    Head:  Normocephalic, atraumatic.  Eyes:  No scleral icterus.  Ears:  Hearing appeared be intact.  NECK:  Supple.  CARDIOVASCULAR:  S1 and S2 heard.  RESPIRATORY:  Air entry bilaterally.  ABDOMEN:  Soft, nontender.  EXTREMITIES:  No clubbing or cyanosis was noted.      NEUROLOGIC:  The patient was awake, alert, on-off blink to visual threat, and it was difficult to ascertain any field cut secondary to the patient having expressive aphasia, could not express herself.  Speech:  Positive dysarthria was noted.  Positive expressive aphasia was noted.  The patient could not say correct age or month secondary to expressive aphasia.  Positive right facial droop was noted.  Motor:  Right upper, there was a pronator drift of her right upper extremity.  Overall strength, right upper was 3/5, left was 5/5.  Right lower positive drift of leg, but did not touch the bed within 5 seconds.  Strength was 4-/5, left was 4+/5.  Sensory:  Appeared to be intact to light touch bilateral upper and lower extremities.  To painful stimuli, did give facial grimace.         LABS:  CBC Full  -  ( 02 Mar 2024 06:25 )  WBC Count : 9.18 K/uL  RBC Count : 4.24 M/uL  Hemoglobin : 12.1 g/dL  Hematocrit : 37.7 %  Platelet Count - Automated : 144 K/uL  Mean Cell Volume : 88.9 fl  Mean Cell Hemoglobin : 28.5 pg  Mean Cell Hemoglobin Concentration : 32.1 gm/dL  Auto Neutrophil # : x  Auto Lymphocyte # : x  Auto Monocyte # : x  Auto Eosinophil # : x  Auto Basophil # : x  Auto Neutrophil % : x  Auto Lymphocyte % : x  Auto Monocyte % : x  Auto Eosinophil % : x  Auto Basophil % : x    Urinalysis Basic - ( 02 Mar 2024 06:25 )    Color: x / Appearance: x / SG: x / pH: x  Gluc: 104 mg/dL / Ketone: x  / Bili: x / Urobili: x   Blood: x / Protein: x / Nitrite: x   Leuk Esterase: x / RBC: x / WBC x   Sq Epi: x / Non Sq Epi: x / Bacteria: x      03-02    140  |  103  |  10  ----------------------------<  104<H>  3.7   |  25  |  0.69    Ca    9.5      02 Mar 2024 06:25    TPro  7.9  /  Alb  4.4  /  TBili  0.8  /  DBili  x   /  AST  17  /  ALT  24  /  AlkPhos  44  03-01    Hemoglobin A1C:     LIVER FUNCTIONS - ( 01 Mar 2024 05:44 )  Alb: 4.4 g/dL / Pro: 7.9 g/dL / ALK PHOS: 44 U/L / ALT: 24 U/L / AST: 17 U/L / GGT: x           Vitamin B12   PT/INR - ( 01 Mar 2024 05:44 )   PT: 11.6 sec;   INR: 1.07 ratio         PTT - ( 01 Mar 2024 05:44 )  PTT:34.8 sec      RADIOLOGY  < from: MR Head No Cont (03.01.24 @ 12:35) >  INTERPRETATION:  EXAM: MRI OF THE BRAIN WITHOUT CONTRAST    HISTORY: Hemiparesis, patient uncooperative    TECHNIQUE: Multi-planar multi-sequentialMR imaging of the brain was   performed without intravenous contrast.    COMPARISON: CT/CTA head/neck March 1, 2024.    FINDINGS:    Study is slightly motion degraded.    Multiple foci of diffusion restriction scattered throughout the left MCA   vascular distribution, compatible with acute infarctions. No   hydrocephalus. Multiple foci of increased T2/FLAIR signal scattered   throughout the deep and periventricular white matter. Chronic lacunar   infarctions in the right centrum semiovale/corona radiata and left   hemipons. Encephalomalacia/gliotic change in the anterior right temporal   lobe. Foci of susceptibility artifact in the right basal ganglia, likely   sequela of prior microhemorrhages. The visualized extra axial spaces and   basal cisterns are within normal limits. No midline shift or mass effect   present.    The craniocervical junction is within normal limits. The pituitary is   unremarkable. The major intracranial vessels demonstrate the expected   signal void related to vascular flow. The paranasal sinuses are well   aerated. Few left fluid-filled mastoid air cells. The visualized orbits   are within normal limits.      IMPRESSION:    1.  Multiple small acute infarctions in the left MCA vascular   distribution.  2.  Chronic ischemic changes as discussed above.        ANALYSIS AND PLAN:  This is a 63-year with expressive aphasia, right hemiparesis, and right facial droop.  Clinical impression is cerebrovascular accident of unclear etiology at present on the left MCA territory, possibly secondary to focal stenosis.   MRI imaging of the brain. positive fo rCVA  Echocardiogram.  Telemetry evaluation.  Check lipid panel, hemoglobin A1c, and TSH.  If the patient is able to swallow, we will recommend aspirin 81 mg along with Plavix 75 mg, after three months, discontinue the patient's aspirin.  We will recommend high-dose statin.  For history of hypertension, for now okay to start controlling the patient's blood pressure since the patient's stroke occurred back on Sunday five days ago.  For history of diabetes, strict control of blood sugars.  S/S  PT norma     Spoke with healthcare proxy, Connie 3/2, her telephone number is [phone], she understands the reasoning and thought process.    58 minutes of time was spent with the patient, planning care, reviewing data, and speaking to multidisciplinary healthcare team with greater than 50% of the time in counseling and care coordination.

## 2024-03-02 NOTE — DIETITIAN INITIAL EVALUATION ADULT - PERTINENT LABORATORY DATA
03-02    140  |  103  |  10  ----------------------------<  104<H>  3.7   |  25  |  0.69    Ca    9.5      02 Mar 2024 06:25    TPro  7.9  /  Alb  4.4  /  TBili  0.8  /  DBili  x   /  AST  17  /  ALT  24  /  AlkPhos  44  03-01  POCT Blood Glucose.: 102 mg/dL (03-02-24 @ 12:43)  A1C with Estimated Average Glucose Result: 5.5 % (03-02-24 @ 06:25)

## 2024-03-02 NOTE — PROVIDER CONTACT NOTE (MEDICATION) - ASSESSMENT
Patient has aphasia and persistent. R sided weakness. VS stable as per flowsheet. Blood glucose at 1726 was 86.

## 2024-03-02 NOTE — SWALLOW BEDSIDE ASSESSMENT ADULT - SWALLOW EVAL: THERAPY FREQUENCY
will f/u at bedside to determine diet tolerance, potential for diet advancement and any further appropriate management/as schedule permits

## 2024-03-02 NOTE — SOCIAL WORK PROGRESS NOTE - NSSWPROGRESSNOTE_GEN_ALL_CORE
Tx team is recommending acute rehab for this pt. SW discussed plan with pt's significant other Connie, who was receptive to plan. Pt was referred to acute rehab [place] admissions for eval. Pt will need auth.

## 2024-03-02 NOTE — OCCUPATIONAL THERAPY INITIAL EVALUATION ADULT - ADDITIONAL COMMENTS
Unable to obtain PLOF or social history due to aphasia. As per note, was functioning ok prior just felt weak prior to fall

## 2024-03-02 NOTE — DIETITIAN INITIAL EVALUATION ADULT - ORAL INTAKE PTA/DIET HISTORY
Visited pt in room, partner at bedside provided pertinent information. Reported eating only one meal daily if blood sugar went up; or restricted her total calorie to 1000 kcal.

## 2024-03-02 NOTE — OCCUPATIONAL THERAPY INITIAL EVALUATION ADULT - PERTINENT HX OF CURRENT PROBLEM, REHAB EVAL
According to the partner, Connie the patient was in her usual state of health up until 5 days ago.   On Sunday, pt was noted to have right sided facial, arm and leg weakness, aphasia and episodes of confusion. She took ASA 81mg that she takes on a regular basis.   Symptoms improved and pt was able to ambulate and function at home but felt weak.   Yesterday, the patient fell prompting her partner to bring her to the ED.   On arrival, pt noted to have dense right sided facial droop and right sided hemiparesis and aphasia.    MRI brain demonstrates multiple small acute infarctions in the left MCA distribution and chronic ischemic changes from prior strokes.

## 2024-03-02 NOTE — DIETITIAN INITIAL EVALUATION ADULT - ADD RECOMMEND
1. Continue with current diet order  2. Provide ONS: Glucerna 8oz (220 kcal, 10 g protein) bid, diet Magic Cup 4oz (290 kcal, 9 g protein) bid  3. Encourage po intake as needed   4. Provide well-balanced diet when appropriate

## 2024-03-02 NOTE — PROVIDER CONTACT NOTE (MEDICATION) - SITUATION
Patient failed initial dysphagia screen this am. Pt. is currently NPO, pending speech and swallow evaluation. Currently ordered statin, plavix and ASA are PO.
Patient's current B/P at 2054 is 193/82. HR 67. Has order for hydralazine 10mg IVP, PRN Q6h for b/p >170. Hydralazine IVP cannot be administered on 2W as per hospital IV Push Medication policy.

## 2024-03-02 NOTE — PHYSICAL THERAPY INITIAL EVALUATION ADULT - ADDITIONAL COMMENTS
Unable to attain complete social history due to pt aphasia. As per chart pt has domestic partner and was independent PTA.

## 2024-03-02 NOTE — PHYSICAL THERAPY INITIAL EVALUATION ADULT - PERTINENT HX OF CURRENT PROBLEM, REHAB EVAL
According to the partner, Connie the patient was in her usual state of health up until 5 days ago.   On Sunday, pt was noted to have right sided facial, arm and leg weakness, aphasia and episodes of confusion. She took ASA 81mg that she takes on a regular basis.   Symptoms improved and pt was able to ambulate and function at home but felt weak.   Yesterday, the patient fell prompting her partner to bring her to the ED.   On arrival, pt noted to have dense right sided facial droop and right sided hemiparesis and aphasia.

## 2024-03-02 NOTE — DIETITIAN INITIAL EVALUATION ADULT - CALCULATED TO (ML/KG)
Received request via: Pharmacy    Was the patient seen in the last year in this department? Yes    Does the patient have an active prescription (recently filled or refills available) for medication(s) requested? No   1703

## 2024-03-02 NOTE — PHYSICAL THERAPY INITIAL EVALUATION ADULT - IMPAIRED TRANSFERS: SIT/STAND, REHAB EVAL
impaired balance/impaired coordination/impaired motor control/narrow base of support/decreased strength

## 2024-03-02 NOTE — DIETITIAN INITIAL EVALUATION ADULT - ETIOLOGY
related to inability to consume sufficient protein/energy secondary to chronic illness vs knowledge deficit

## 2024-03-02 NOTE — SPEECH LANGUAGE PATHOLOGY EVALUATION - COMMENTS
Per charting - 63F with HTN, HLD, DM2, CVA (10/2023) presents with right sided facial droop, r hemiparesis, aphasia that started 5 days ago and fall     1. Left MCA distribution acute CVA  MRI brain demonstrates multiple small acute infarctions in the left MCA distribution and chronic ischemic changes from prior strokes.     Pt is received alert and cooperative. Obvious communication deficits noted from initial approach/introduction.  Pt also seen for swallow eval, please see report for details.

## 2024-03-02 NOTE — DIETITIAN INITIAL EVALUATION ADULT - REASON FOR ADMISSION
HPI:  63F with HTN, HLD, DM2, CVA (10/2023) without residual neuro deficits presents following a fall at home.    According to the partner, Connie the patient was in her usual state of health up until 5 days ago.   On Sunday, pt was noted to have right sided facial, arm and leg weakness, aphasia and episodes of confusion. She took ASA 81mg that she takes on a regular basis.   Symptoms improved and pt was able to ambulate and function at home but felt weak.   Yesterday, the patient fell prompting her partner to bring her to the ED.   On arrival, pt noted to have dense right sided facial droop and right sided hemiparesis and aphasia.   NIHSS 8 (01 Mar 2024 13:11)

## 2024-03-02 NOTE — DIETITIAN INITIAL EVALUATION ADULT - PHYSICAL ASSESSMENT ORBITAL
mild
Eyes with no visual disturbances.  Ears clean and dry and no hearing difficulties. Nose with pink mucosa and no drainage.  Mouth mucous membranes moist and pink.  No tenderness or swelling to throat or neck.

## 2024-03-02 NOTE — SWALLOW BEDSIDE ASSESSMENT ADULT - ASR SWALLOW RECOMMEND DIAG
will defer at this time given patient presentation, appearance of functional pharyngeal stage swallow

## 2024-03-02 NOTE — DIETITIAN INITIAL EVALUATION ADULT - OTHER INFO
Visited patient in room, presents NPO x2 days. Seen Speech-Language Pathologist, recommended pureed/thin liquid diet this am. Denies n/v/d/c, no BM noted. NKFA. Reported 4# wt loss in 1 month, current adm weight 94#, 4% wt loss in 1 month is not clinically significant, will continue to monitor weight trends as able.     Pertinent medications/nutrition labs reviewed; FS  x24hr, a1c 5.5% with hx of DM; receiving D5 + IVF; In house ordered for lispro sliding scale to aid in glucose management.     Education is not appropriate at this time. Will add Glucerna 8oz (220 kcal, 10 g protein) bid, diet Magic Cup 4oz (290 kcal, 9 g protein) bid to optimize intake. RD to continue to monitor nutrition status per protocol.

## 2024-03-02 NOTE — SWALLOW BEDSIDE ASSESSMENT ADULT - COMMENTS
per charting - 63F with HTN, HLD, DM2, CVA (10/2023) presents with right sided facial droop, r hemiparesis, aphasia that started 5 days ago and fall   MRI brain demonstrates multiple small acute infarctions in the left MCA distribution and chronic ischemic changes from prior strokes.  Pt seen for swallow eval this date. Pt is alert and intermittently following commands. Judged to have dysarthria and aphasia. Speech/language eval completed. Please see report for details

## 2024-03-02 NOTE — SWALLOW BEDSIDE ASSESSMENT ADULT - ADDITIONAL RECOMMENDATIONS
Please reconsult this service with any change in status that could negatively impact swallow function or if any difficulty swallowing noted.

## 2024-03-02 NOTE — SWALLOW BEDSIDE ASSESSMENT ADULT - SWALLOW EVAL: DIAGNOSIS
Patient presents with oral dysphagia and appearance of functional pharyngeal swallow for puree and thin liquids. Oral stage marked by reduced oral grading, good oral containment, delayed oral transit time. Increased time required for bolus manipulation and posterior transport. Successful oral clearance. Chewable solids not administered given reduced oral range of motion. Pharyngeal stage marked by appearance of timely swallow initiation, present hyolaryngeal elevation/excursion, no overt signs of aspiration/penetration.

## 2024-03-02 NOTE — DIETITIAN INITIAL EVALUATION ADULT - PERTINENT MEDS FT
MEDICATIONS  (STANDING):  aspirin enteric coated 81 milliGRAM(s) Oral daily  atorvastatin 80 milliGRAM(s) Oral at bedtime  clopidogrel Tablet 75 milliGRAM(s) Oral daily  dextrose 50% Injectable 25 Gram(s) IV Push once  enoxaparin Injectable 40 milliGRAM(s) SubCutaneous every 24 hours  glucagon  Injectable 1 milliGRAM(s) IntraMuscular once  insulin lispro (ADMELOG) corrective regimen sliding scale   SubCutaneous three times a day before meals    MEDICATIONS  (PRN):  acetaminophen     Tablet .. 650 milliGRAM(s) Oral every 6 hours PRN Temp greater or equal to 38C (100.4F), Mild Pain (1 - 3)  aluminum hydroxide/magnesium hydroxide/simethicone Suspension 30 milliLiter(s) Oral every 4 hours PRN Dyspepsia  dextrose Oral Gel 15 Gram(s) Oral once PRN Blood Glucose LESS THAN 70 milliGRAM(s)/deciliter  melatonin 3 milliGRAM(s) Oral at bedtime PRN Insomnia  ondansetron Injectable 4 milliGRAM(s) IV Push every 8 hours PRN Nausea and/or Vomiting

## 2024-03-02 NOTE — SPEECH LANGUAGE PATHOLOGY EVALUATION - SLP DIAGNOSIS
1. Pt presents with mild-moderate dysarthria. Speech is imprecise/slow due to oral motor deficits. Rate of speech also slow. Pt with low vocal intensity. Intelligibility is far at sentence level, improved at word level. 2. Pt with receptive language deficits, appears to have difficulty with yes/no responses and is not able to respond to simple wh questions. Difficulty identifying target object in field of 2. Pt often responding "I don't know" when prompted for various structured tasks. Intermittently is able to follow 1 step commands (approx 50% accuracy). Improves with verbal cues.  3. Pt with expressive aphasia, atient with obvious word finding deficits, often stating no instead of yes and with multiple semantic paraphasias throughout assessment. Also with difficulty with confrontation naming tasks.

## 2024-03-02 NOTE — PROVIDER CONTACT NOTE (MEDICATION) - ASSESSMENT
b/p at 1500 was 175/78. 1900 was 181/84. Lisinopril 20mg PO (daily)  administered at 1900. Blood glucose at 2100 was 131. Pt. without signs of discomfort.

## 2024-03-03 LAB
GLUCOSE BLDC GLUCOMTR-MCNC: 110 MG/DL — HIGH (ref 70–99)
GLUCOSE BLDC GLUCOMTR-MCNC: 118 MG/DL — HIGH (ref 70–99)
GLUCOSE BLDC GLUCOMTR-MCNC: 122 MG/DL — HIGH (ref 70–99)

## 2024-03-03 PROCEDURE — 99232 SBSQ HOSP IP/OBS MODERATE 35: CPT

## 2024-03-03 RX ORDER — POLYETHYLENE GLYCOL 3350 17 G/17G
17 POWDER, FOR SOLUTION ORAL ONCE
Refills: 0 | Status: COMPLETED | OUTPATIENT
Start: 2024-03-03 | End: 2024-03-03

## 2024-03-03 RX ORDER — AMLODIPINE BESYLATE 2.5 MG/1
5 TABLET ORAL DAILY
Refills: 0 | Status: DISCONTINUED | OUTPATIENT
Start: 2024-03-03 | End: 2024-03-04

## 2024-03-03 RX ADMIN — LISINOPRIL 20 MILLIGRAM(S): 2.5 TABLET ORAL at 06:08

## 2024-03-03 RX ADMIN — ENOXAPARIN SODIUM 40 MILLIGRAM(S): 100 INJECTION SUBCUTANEOUS at 21:03

## 2024-03-03 RX ADMIN — POLYETHYLENE GLYCOL 3350 17 GRAM(S): 17 POWDER, FOR SOLUTION ORAL at 11:28

## 2024-03-03 RX ADMIN — ATORVASTATIN CALCIUM 80 MILLIGRAM(S): 80 TABLET, FILM COATED ORAL at 21:03

## 2024-03-03 RX ADMIN — Medication 1 ENEMA: at 11:28

## 2024-03-03 RX ADMIN — AMLODIPINE BESYLATE 5 MILLIGRAM(S): 2.5 TABLET ORAL at 11:28

## 2024-03-03 RX ADMIN — Medication 81 MILLIGRAM(S): at 11:28

## 2024-03-03 RX ADMIN — CLOPIDOGREL BISULFATE 75 MILLIGRAM(S): 75 TABLET, FILM COATED ORAL at 11:28

## 2024-03-03 NOTE — PROGRESS NOTE ADULT - SUBJECTIVE AND OBJECTIVE BOX
Patient is a 63y old  Female who presents with a chief complaint of Right sided droop (03 Mar 2024 09:12)        HPI:  63F with HTN, HLD, DM2, CVA (10/2023) without residual neuro deficits presents following a fall at home.    According to the partner, Connie the patient was in her usual state of health up until 5 days ago.   On Sunday, pt was noted to have right sided facial, arm and leg weakness, aphasia and episodes of confusion. She took ASA 81mg that she takes on a regular basis.   Symptoms improved and pt was able to ambulate and function at home but felt weak.   Yesterday, the patient fell prompting her partner to bring her to the ED.   On arrival, pt noted to have dense right sided facial droop and right sided hemiparesis and aphasia.   NIHSS 8 (01 Mar 2024 13:11)      SUBJECTIVE & OBJECTIVE: Pt seen and examined at bedside. aphasic     PHYSICAL EXAM:  T(C): 36.8 (03-03-24 @ 07:58), Max: 36.8 (03-02-24 @ 15:00)  HR: 60 (03-03-24 @ 07:58) (59 - 84)  BP: 136/78 (03-03-24 @ 07:58) (136/78 - 193/82)  RR: 16 (03-03-24 @ 07:58) (16 - 16)  SpO2: 99% (03-03-24 @ 07:58) (98% - 99%)  Wt(kg): --   GENERAL: NAD, well-groomed, well-developed  HEAD:  Atraumatic, Normocephalic  EYES: EOMI, PERRLA, conjunctiva and sclera clear  ENMT: Moist mucous membranes  NECK: Supple, No JVD  NERVOUS SYSTEM:  Alert & Oriented X3, Motor Strength 5/5 B/L upper and lower extremities; DTRs 2+ intact and symmetric  CHEST/LUNG: Clear to auscultation bilaterally; No rales, rhonchi, wheezing, or rubs  HEART: Regular rate and rhythm; No murmurs, rubs, or gallops  ABDOMEN: Soft, Nontender, Nondistended; Bowel sounds present  EXTREMITIES:  2+ Peripheral Pulses, No clubbing, cyanosis, or edema        MEDICATIONS  (STANDING):  amLODIPine   Tablet 5 milliGRAM(s) Oral daily  aspirin enteric coated 81 milliGRAM(s) Oral daily  atorvastatin 80 milliGRAM(s) Oral at bedtime  clopidogrel Tablet 75 milliGRAM(s) Oral daily  dextrose 50% Injectable 25 Gram(s) IV Push once  enoxaparin Injectable 40 milliGRAM(s) SubCutaneous every 24 hours  glucagon  Injectable 1 milliGRAM(s) IntraMuscular once  insulin lispro (ADMELOG) corrective regimen sliding scale   SubCutaneous three times a day before meals  lisinopril 20 milliGRAM(s) Oral daily  polyethylene glycol 3350 17 Gram(s) Oral once    MEDICATIONS  (PRN):  acetaminophen     Tablet .. 650 milliGRAM(s) Oral every 6 hours PRN Temp greater or equal to 38C (100.4F), Mild Pain (1 - 3)  aluminum hydroxide/magnesium hydroxide/simethicone Suspension 30 milliLiter(s) Oral every 4 hours PRN Dyspepsia  dextrose Oral Gel 15 Gram(s) Oral once PRN Blood Glucose LESS THAN 70 milliGRAM(s)/deciliter  melatonin 3 milliGRAM(s) Oral at bedtime PRN Insomnia  ondansetron Injectable 4 milliGRAM(s) IV Push every 8 hours PRN Nausea and/or Vomiting      LABS:                        12.1   9.18  )-----------( 144      ( 02 Mar 2024 06:25 )             37.7     03-02    140  |  103  |  10  ----------------------------<  104<H>  3.7   |  25  |  0.69    Ca    9.5      02 Mar 2024 06:25        Urinalysis Basic - ( 02 Mar 2024 06:25 )    Color: x / Appearance: x / SG: x / pH: x  Gluc: 104 mg/dL / Ketone: x  / Bili: x / Urobili: x   Blood: x / Protein: x / Nitrite: x   Leuk Esterase: x / RBC: x / WBC x   Sq Epi: x / Non Sq Epi: x / Bacteria: x        CAPILLARY BLOOD GLUCOSE      POCT Blood Glucose.: 122 mg/dL (03 Mar 2024 07:47)  POCT Blood Glucose.: 131 mg/dL (02 Mar 2024 21:10)  POCT Blood Glucose.: 102 mg/dL (02 Mar 2024 16:36)  POCT Blood Glucose.: 102 mg/dL (02 Mar 2024 12:43)      CAPILLARY BLOOD GLUCOSE      POCT Blood Glucose.: 122 mg/dL (03 Mar 2024 07:47)  POCT Blood Glucose.: 131 mg/dL (02 Mar 2024 21:10)  POCT Blood Glucose.: 102 mg/dL (02 Mar 2024 16:36)  POCT Blood Glucose.: 102 mg/dL (02 Mar 2024 12:43)    CAPILLARY BLOOD GLUCOSE      POCT Blood Glucose.: 122 mg/dL (03 Mar 2024 07:47)            RECENT CULTURES:      RADIOLOGY & ADDITIONAL TESTS:                        DVT/GI ppx  Discussed with pt @ bedside

## 2024-03-03 NOTE — PROGRESS NOTE ADULT - SUBJECTIVE AND OBJECTIVE BOX
Neurology follow up note    MAY [id number]      Interval History:    Patient feels ok no new complaints.    Allergies    Allergy Status Unknown    Intolerances        MEDICATIONS    acetaminophen     Tablet .. 650 milliGRAM(s) Oral every 6 hours PRN  aluminum hydroxide/magnesium hydroxide/simethicone Suspension 30 milliLiter(s) Oral every 4 hours PRN  amLODIPine   Tablet 5 milliGRAM(s) Oral daily  aspirin enteric coated 81 milliGRAM(s) Oral daily  atorvastatin 80 milliGRAM(s) Oral at bedtime  clopidogrel Tablet 75 milliGRAM(s) Oral daily  dextrose 50% Injectable 25 Gram(s) IV Push once  dextrose Oral Gel 15 Gram(s) Oral once PRN  enoxaparin Injectable 40 milliGRAM(s) SubCutaneous every 24 hours  glucagon  Injectable 1 milliGRAM(s) IntraMuscular once  insulin lispro (ADMELOG) corrective regimen sliding scale   SubCutaneous three times a day before meals  lisinopril 20 milliGRAM(s) Oral daily  melatonin 3 milliGRAM(s) Oral at bedtime PRN  ondansetron Injectable 4 milliGRAM(s) IV Push every 8 hours PRN  polyethylene glycol 3350 17 Gram(s) Oral once              Vital Signs Last 24 Hrs  T(C): 36.8 (03 Mar 2024 07:58), Max: 36.8 (02 Mar 2024 15:00)  T(F): 98.3 (03 Mar 2024 07:58), Max: 98.3 (02 Mar 2024 15:00)  HR: 60 (03 Mar 2024 07:58) (59 - 84)  BP: 136/78 (03 Mar 2024 07:58) (136/78 - 193/82)  BP(mean): --  RR: 16 (03 Mar 2024 07:58) (16 - 16)  SpO2: 99% (03 Mar 2024 07:58) (98% - 99%)    Parameters below as of 03 Mar 2024 07:58  Patient On (Oxygen Delivery Method): room air      REVIEW OF SYSTEMS:  Could not be obtained secondary to the patient has expressive aphasia, but has been in normal state of health.  On Sunday, stopped speaking.  Last night, had an episode where she had fallen out of bed and also on Sunday had right facial drooping.      PHYSICAL EXAMINATION:    Head:  Normocephalic, atraumatic.  Eyes:  No scleral icterus.  Ears:  Hearing appeared be intact.  NECK:  Supple.  CARDIOVASCULAR:  S1 and S2 heard.  RESPIRATORY:  Air entry bilaterally.  ABDOMEN:  Soft, nontender.  EXTREMITIES:  No clubbing or cyanosis was noted.      NEUROLOGIC:  The patient was awake, alert, on-off blink to visual threat, and it was difficult to ascertain any field cut secondary to the patient having expressive aphasia, could not express herself.  Speech:  Positive dysarthria was noted.  Positive expressive aphasia was noted.  The patient could not say correct age or month secondary to expressive aphasia.  Positive right facial droop was noted.  Motor:  Right upper, there was a pronator drift of her right upper extremity.  Overall strength, right upper was 3/5, left was 5/5.  Right lower positive drift of leg, but did not touch the bed within 5 seconds.  Strength was 4-/5, left was 4+/5.  Sensory:  Appeared to be intact to light touch bilateral upper and lower extremities.  To painful stimuli, did give facial grimace.        LABS:  CBC Full  -  ( 02 Mar 2024 06:25 )  WBC Count : 9.18 K/uL  RBC Count : 4.24 M/uL  Hemoglobin : 12.1 g/dL  Hematocrit : 37.7 %  Platelet Count - Automated : 144 K/uL  Mean Cell Volume : 88.9 fl  Mean Cell Hemoglobin : 28.5 pg  Mean Cell Hemoglobin Concentration : 32.1 gm/dL  Auto Neutrophil # : x  Auto Lymphocyte # : x  Auto Monocyte # : x  Auto Eosinophil # : x  Auto Basophil # : x  Auto Neutrophil % : x  Auto Lymphocyte % : x  Auto Monocyte % : x  Auto Eosinophil % : x  Auto Basophil % : x    Urinalysis Basic - ( 02 Mar 2024 06:25 )    Color: x / Appearance: x / SG: x / pH: x  Gluc: 104 mg/dL / Ketone: x  / Bili: x / Urobili: x   Blood: x / Protein: x / Nitrite: x   Leuk Esterase: x / RBC: x / WBC x   Sq Epi: x / Non Sq Epi: x / Bacteria: x      03-02    140  |  103  |  10  ----------------------------<  104<H>  3.7   |  25  |  0.69    Ca    9.5      02 Mar 2024 06:25      Hemoglobin A1C:       Vitamin B12         RADIOLOGY    < from: MR Head No Cont (03.01.24 @ 12:35) >  INTERPRETATION:  EXAM: MRI OF THE BRAIN WITHOUT CONTRAST    HISTORY: Hemiparesis, patient uncooperative    TECHNIQUE: Multi-planar multi-sequentialMR imaging of the brain was   performed without intravenous contrast.    COMPARISON: CT/CTA head/neck March 1, 2024.    FINDINGS:    Study is slightly motion degraded.    Multiple foci of diffusion restriction scattered throughout the left MCA   vascular distribution, compatible with acute infarctions. No   hydrocephalus. Multiple foci of increased T2/FLAIR signal scattered   throughout the deep and periventricular white matter. Chronic lacunar   infarctions in the right centrum semiovale/corona radiata and left   hemipons. Encephalomalacia/gliotic change in the anterior right temporal   lobe. Foci of susceptibility artifact in the right basal ganglia, likely   sequela of prior microhemorrhages. The visualized extra axial spaces and   basal cisterns are within normal limits. No midline shift or mass effect   present.    The craniocervical junction is within normal limits. The pituitary is   unremarkable. The major intracranial vessels demonstrate the expected   signal void related to vascular flow. The paranasal sinuses are well   aerated. Few left fluid-filled mastoid air cells. The visualized orbits   are within normal limits.      IMPRESSION:    1.  Multiple small acute infarctions in the left MCA vascular   distribution.  2.  Chronic ischemic changes as discussed above.        ANALYSIS AND PLAN:  This is a 63-year with expressive aphasia, right hemiparesis, and right facial droop.  Clinical impression is cerebrovascular accident of unclear etiology at present on the left MCA territory, possibly secondary to focal stenosis.   MRI imaging of the brain. positive fo rCVA  Echocardiogram.  Telemetry evaluation   aspirin 81 mg along with Plavix 75 mg, after three months, discontinue the patient's aspirin.  We will recommend high-dose statin.  For history of hypertension, for now okay to start controlling the patient's blood pressure since the patient's stroke occurred back on Sunday five days ago.  For history of diabetes, strict control of blood sugars.  S/S  PT eval   would benefit acute rehab     Spoke with healthcare proxy, Connie 3/3, her telephone number is [phone], she understands the reasoning and thought process.    50 minutes of time was spent with the patient, planning care, reviewing data, and speaking to multidisciplinary healthcare team with greater than 50% of the time in counseling and care coordination.

## 2024-03-03 NOTE — PROGRESS NOTE ADULT - NUTRITIONAL ASSESSMENT
This patient has been assessed with a concern for Malnutrition and has been determined to have a diagnosis/diagnoses of Severe protein-calorie malnutrition and Underweight (BMI < 19).    This patient is being managed with:   Diet Pureed-  Supplement Feeding Modality:  Oral  Glucerna Shake Cans or Servings Per Day:  1       Frequency:  Two Times a day  Entered: Mar  2 2024  3:17PM    Diet Pureed-  Entered: Mar  2 2024 11:39AM    The following pending diet order is being considered for treatment of Severe protein-calorie malnutrition and Underweight (BMI < 19):null

## 2024-03-03 NOTE — CONSULT NOTE ADULT - SUBJECTIVE AND OBJECTIVE BOX
Physical Medicine and Rehabilitation Initial Evaluation    Patients acute care records reviewed and are summarized as follows:     Patient is a 63y Female who is admitted to acute care for CVA. Patient now with R facial, arm, and leg weakness, as well as aphasia. Prior to CVA patient was independent with ADL's, mobility, transfers.    Medical studies/laboratory studies reviewed, including renal function which is within normal limits.    The patient was seen and examined at bedside. patient is not able to provide meaningful history secondary to aphasia. History is obtained from patient's daughter who is at bedside.    ROS: not able to obtain secondary to aphasia    PAST MEDICAL & SURGICAL HISTORY:  HTN (hypertension)  DM (diabetes mellitus)  CVA (cerebrovascular accident)  No significant past surgical history    Medications: reviewed    Physical Exam:   Vitals: reviewed    Constitutional: Gen: In no acute distress, cooperative with exam and questioning but only partially able to participate  Neuro: aphasic speech, able to follow simple commands and yes/no reliability is good, there is noted right-sided facial droop, right upper extremity weakness, right lower extremity weakness relative to the left side, right side is approximately3 to 4-/5 throughout the UE and LE. LUE and LLE are closer to 4 to 4+/5, Sensation is intact in the extremities  Psychiatric: Awake alert

## 2024-03-03 NOTE — PROGRESS NOTE ADULT - ASSESSMENT
63F with HTN, HLD, DM2, CVA (10/2023) presents with right sided facial droop, r hemiparesis, aphasia that started 5 days ago and fall     1. Left MCA distribution acute CVA  MRI brain demonstrates multiple small acute infarctions in the left MCA distribution and chronic ischemic changes from prior strokes.   CTA head/neck: No LVO occlusion   Plan:  ASA 81mg and plavix 75mg x3 months followed by monotherapy   Atorvastatin 80mg   echo peidng  speech /swallow passed will start on diet   2. DM2  Hold home metformin 500mg BID  AIC, correction scale insulin   Depending on insulin need and if able to tolerato, coverage can be started accordingly     3. HTN   Hold home lisinopril 20mg, allow permissive hypertension <190mmHg     4. HLD   Statin       VTE ppx: Lovenox     time spent 35 miuntes   
63F with HTN, HLD, DM2, CVA (10/2023) presents with right sided facial droop, r hemiparesis, aphasia that started 5 days ago and fall     1. Left MCA distribution acute CVA  MRI brain demonstrates multiple small acute infarctions in the left MCA distribution and chronic ischemic changes from prior strokes.   CTA head/neck: No LVO occlusion   Plan:  ASA 81mg and plavix 75mg x3 months followed by monotherapy   Atorvastatin 80mg   echo peidng  speech /swallow passed will start on diet , toleratig diet  d/c planning to acute rehab     HTN  will start on lisinopril, and norvasac  maintain bp around 140s systolic  low Na diet       DM2  Hb1ac 5.5  will d/f metfomrin to prevent hypoglycemia     constipation  enema  miralax     4. HLD   Statin       VTE ppx: Lovenox     time spent 35 miuntes

## 2024-03-04 ENCOUNTER — INPATIENT (INPATIENT)
Facility: HOSPITAL | Age: 64
LOS: 20 days | Discharge: NURSING, REHAB OR LONG-TERM CARE | DRG: 66 | End: 2024-03-25
Attending: PHYSICAL MEDICINE & REHABILITATION | Admitting: PHYSICAL MEDICINE & REHABILITATION
Payer: MEDICAID

## 2024-03-04 VITALS
HEART RATE: 82 BPM | RESPIRATION RATE: 18 BRPM | DIASTOLIC BLOOD PRESSURE: 83 MMHG | TEMPERATURE: 98 F | OXYGEN SATURATION: 97 % | SYSTOLIC BLOOD PRESSURE: 171 MMHG

## 2024-03-04 VITALS
HEIGHT: 59 IN | TEMPERATURE: 98 F | RESPIRATION RATE: 16 BRPM | HEART RATE: 80 BPM | DIASTOLIC BLOOD PRESSURE: 84 MMHG | SYSTOLIC BLOOD PRESSURE: 152 MMHG | OXYGEN SATURATION: 98 % | WEIGHT: 87.3 LBS

## 2024-03-04 DIAGNOSIS — I63.9 CEREBRAL INFARCTION, UNSPECIFIED: ICD-10-CM

## 2024-03-04 LAB
ALBUMIN SERPL ELPH-MCNC: 3.9 G/DL — SIGNIFICANT CHANGE UP (ref 3.3–5)
ALP SERPL-CCNC: 45 U/L — SIGNIFICANT CHANGE UP (ref 30–120)
ALT FLD-CCNC: 21 U/L — SIGNIFICANT CHANGE UP (ref 10–60)
ANION GAP SERPL CALC-SCNC: 16 MMOL/L — SIGNIFICANT CHANGE UP (ref 5–17)
AST SERPL-CCNC: 21 U/L — SIGNIFICANT CHANGE UP (ref 10–40)
BILIRUB SERPL-MCNC: 1.2 MG/DL — SIGNIFICANT CHANGE UP (ref 0.2–1.2)
BUN SERPL-MCNC: 9 MG/DL — SIGNIFICANT CHANGE UP (ref 7–23)
CALCIUM SERPL-MCNC: 9.4 MG/DL — SIGNIFICANT CHANGE UP (ref 8.4–10.5)
CHLORIDE SERPL-SCNC: 103 MMOL/L — SIGNIFICANT CHANGE UP (ref 96–108)
CO2 SERPL-SCNC: 23 MMOL/L — SIGNIFICANT CHANGE UP (ref 22–31)
CREAT SERPL-MCNC: 0.61 MG/DL — SIGNIFICANT CHANGE UP (ref 0.5–1.3)
EGFR: 100 ML/MIN/1.73M2 — SIGNIFICANT CHANGE UP
FLUAV AG NPH QL: SIGNIFICANT CHANGE UP
FLUBV AG NPH QL: SIGNIFICANT CHANGE UP
GLUCOSE BLDC GLUCOMTR-MCNC: 108 MG/DL — HIGH (ref 70–99)
GLUCOSE BLDC GLUCOMTR-MCNC: 118 MG/DL — HIGH (ref 70–99)
GLUCOSE BLDC GLUCOMTR-MCNC: 119 MG/DL — HIGH (ref 70–99)
GLUCOSE BLDC GLUCOMTR-MCNC: 120 MG/DL — HIGH (ref 70–99)
GLUCOSE SERPL-MCNC: 101 MG/DL — HIGH (ref 70–99)
HCT VFR BLD CALC: 37.3 % — SIGNIFICANT CHANGE UP (ref 34.5–45)
HGB BLD-MCNC: 12.3 G/DL — SIGNIFICANT CHANGE UP (ref 11.5–15.5)
MCHC RBC-ENTMCNC: 28.9 PG — SIGNIFICANT CHANGE UP (ref 27–34)
MCHC RBC-ENTMCNC: 33 GM/DL — SIGNIFICANT CHANGE UP (ref 32–36)
MCV RBC AUTO: 87.8 FL — SIGNIFICANT CHANGE UP (ref 80–100)
NRBC # BLD: 0 /100 WBCS — SIGNIFICANT CHANGE UP (ref 0–0)
PLATELET # BLD AUTO: 265 K/UL — SIGNIFICANT CHANGE UP (ref 150–400)
POTASSIUM SERPL-MCNC: 3.1 MMOL/L — LOW (ref 3.5–5.3)
POTASSIUM SERPL-SCNC: 3.1 MMOL/L — LOW (ref 3.5–5.3)
PROT SERPL-MCNC: 7.1 G/DL — SIGNIFICANT CHANGE UP (ref 6–8.3)
RBC # BLD: 4.25 M/UL — SIGNIFICANT CHANGE UP (ref 3.8–5.2)
RBC # FLD: 11.9 % — SIGNIFICANT CHANGE UP (ref 10.3–14.5)
RSV RNA NPH QL NAA+NON-PROBE: SIGNIFICANT CHANGE UP
SARS-COV-2 RNA SPEC QL NAA+PROBE: SIGNIFICANT CHANGE UP
SODIUM SERPL-SCNC: 142 MMOL/L — SIGNIFICANT CHANGE UP (ref 135–145)
WBC # BLD: 10.75 K/UL — HIGH (ref 3.8–10.5)
WBC # FLD AUTO: 10.75 K/UL — HIGH (ref 3.8–10.5)

## 2024-03-04 PROCEDURE — 97161 PT EVAL LOW COMPLEX 20 MIN: CPT

## 2024-03-04 PROCEDURE — 36415 COLL VENOUS BLD VENIPUNCTURE: CPT

## 2024-03-04 PROCEDURE — 99239 HOSP IP/OBS DSCHRG MGMT >30: CPT

## 2024-03-04 PROCEDURE — 97535 SELF CARE MNGMENT TRAINING: CPT

## 2024-03-04 PROCEDURE — 80053 COMPREHEN METABOLIC PANEL: CPT

## 2024-03-04 PROCEDURE — 71045 X-RAY EXAM CHEST 1 VIEW: CPT

## 2024-03-04 PROCEDURE — 86803 HEPATITIS C AB TEST: CPT

## 2024-03-04 PROCEDURE — 83036 HEMOGLOBIN GLYCOSYLATED A1C: CPT

## 2024-03-04 PROCEDURE — 82962 GLUCOSE BLOOD TEST: CPT

## 2024-03-04 PROCEDURE — 93306 TTE W/DOPPLER COMPLETE: CPT

## 2024-03-04 PROCEDURE — 70450 CT HEAD/BRAIN W/O DYE: CPT | Mod: MC

## 2024-03-04 PROCEDURE — 93005 ELECTROCARDIOGRAM TRACING: CPT

## 2024-03-04 PROCEDURE — 84443 ASSAY THYROID STIM HORMONE: CPT

## 2024-03-04 PROCEDURE — 97116 GAIT TRAINING THERAPY: CPT

## 2024-03-04 PROCEDURE — 85025 COMPLETE CBC W/AUTO DIFF WBC: CPT

## 2024-03-04 PROCEDURE — 97110 THERAPEUTIC EXERCISES: CPT

## 2024-03-04 PROCEDURE — 85610 PROTHROMBIN TIME: CPT

## 2024-03-04 PROCEDURE — 80048 BASIC METABOLIC PNL TOTAL CA: CPT

## 2024-03-04 PROCEDURE — 80061 LIPID PANEL: CPT

## 2024-03-04 PROCEDURE — 70496 CT ANGIOGRAPHY HEAD: CPT | Mod: MC

## 2024-03-04 PROCEDURE — 70498 CT ANGIOGRAPHY NECK: CPT | Mod: MC

## 2024-03-04 PROCEDURE — 99285 EMERGENCY DEPT VISIT HI MDM: CPT

## 2024-03-04 PROCEDURE — 85730 THROMBOPLASTIN TIME PARTIAL: CPT

## 2024-03-04 PROCEDURE — 97530 THERAPEUTIC ACTIVITIES: CPT

## 2024-03-04 PROCEDURE — 84484 ASSAY OF TROPONIN QUANT: CPT

## 2024-03-04 PROCEDURE — 85027 COMPLETE CBC AUTOMATED: CPT

## 2024-03-04 RX ORDER — INSULIN LISPRO 100/ML
VIAL (ML) SUBCUTANEOUS AT BEDTIME
Refills: 0 | Status: DISCONTINUED | OUTPATIENT
Start: 2024-03-04 | End: 2024-03-05

## 2024-03-04 RX ORDER — LANOLIN ALCOHOL/MO/W.PET/CERES
3 CREAM (GRAM) TOPICAL AT BEDTIME
Refills: 0 | Status: DISCONTINUED | OUTPATIENT
Start: 2024-03-04 | End: 2024-03-25

## 2024-03-04 RX ORDER — ENOXAPARIN SODIUM 100 MG/ML
30 INJECTION SUBCUTANEOUS EVERY 24 HOURS
Refills: 0 | Status: DISCONTINUED | OUTPATIENT
Start: 2024-03-04 | End: 2024-03-07

## 2024-03-04 RX ORDER — CLOPIDOGREL BISULFATE 75 MG/1
75 TABLET, FILM COATED ORAL DAILY
Refills: 0 | Status: DISCONTINUED | OUTPATIENT
Start: 2024-03-04 | End: 2024-03-25

## 2024-03-04 RX ORDER — AMLODIPINE BESYLATE 2.5 MG/1
1 TABLET ORAL
Qty: 0 | Refills: 0 | DISCHARGE
Start: 2024-03-04

## 2024-03-04 RX ORDER — SODIUM CHLORIDE 9 MG/ML
1000 INJECTION, SOLUTION INTRAVENOUS
Refills: 0 | Status: DISCONTINUED | OUTPATIENT
Start: 2024-03-04 | End: 2024-03-18

## 2024-03-04 RX ORDER — ACETAMINOPHEN 500 MG
650 TABLET ORAL EVERY 6 HOURS
Refills: 0 | Status: DISCONTINUED | OUTPATIENT
Start: 2024-03-04 | End: 2024-03-25

## 2024-03-04 RX ORDER — LISINOPRIL 2.5 MG/1
20 TABLET ORAL DAILY
Refills: 0 | Status: DISCONTINUED | OUTPATIENT
Start: 2024-03-04 | End: 2024-03-25

## 2024-03-04 RX ORDER — CLOPIDOGREL BISULFATE 75 MG/1
1 TABLET, FILM COATED ORAL
Qty: 0 | Refills: 0 | DISCHARGE
Start: 2024-03-04

## 2024-03-04 RX ORDER — LANOLIN ALCOHOL/MO/W.PET/CERES
1 CREAM (GRAM) TOPICAL
Qty: 0 | Refills: 0 | DISCHARGE
Start: 2024-03-04

## 2024-03-04 RX ORDER — INSULIN LISPRO 100/ML
VIAL (ML) SUBCUTANEOUS
Refills: 0 | Status: DISCONTINUED | OUTPATIENT
Start: 2024-03-04 | End: 2024-03-05

## 2024-03-04 RX ORDER — GABAPENTIN 400 MG/1
200 CAPSULE ORAL AT BEDTIME
Refills: 0 | Status: DISCONTINUED | OUTPATIENT
Start: 2024-03-04 | End: 2024-03-25

## 2024-03-04 RX ORDER — ATORVASTATIN CALCIUM 80 MG/1
1 TABLET, FILM COATED ORAL
Qty: 0 | Refills: 0 | DISCHARGE
Start: 2024-03-04

## 2024-03-04 RX ORDER — DEXTROSE 50 % IN WATER 50 %
25 SYRINGE (ML) INTRAVENOUS ONCE
Refills: 0 | Status: DISCONTINUED | OUTPATIENT
Start: 2024-03-04 | End: 2024-03-18

## 2024-03-04 RX ORDER — ASPIRIN/CALCIUM CARB/MAGNESIUM 324 MG
81 TABLET ORAL DAILY
Refills: 0 | Status: DISCONTINUED | OUTPATIENT
Start: 2024-03-04 | End: 2024-03-25

## 2024-03-04 RX ORDER — ONDANSETRON 8 MG/1
4 TABLET, FILM COATED ORAL EVERY 8 HOURS
Refills: 0 | Status: DISCONTINUED | OUTPATIENT
Start: 2024-03-04 | End: 2024-03-21

## 2024-03-04 RX ORDER — ATORVASTATIN CALCIUM 80 MG/1
80 TABLET, FILM COATED ORAL AT BEDTIME
Refills: 0 | Status: DISCONTINUED | OUTPATIENT
Start: 2024-03-04 | End: 2024-03-25

## 2024-03-04 RX ORDER — DEXTROSE 50 % IN WATER 50 %
12.5 SYRINGE (ML) INTRAVENOUS ONCE
Refills: 0 | Status: DISCONTINUED | OUTPATIENT
Start: 2024-03-04 | End: 2024-03-18

## 2024-03-04 RX ORDER — AMLODIPINE BESYLATE 2.5 MG/1
5 TABLET ORAL DAILY
Refills: 0 | Status: DISCONTINUED | OUTPATIENT
Start: 2024-03-04 | End: 2024-03-06

## 2024-03-04 RX ORDER — SENNA PLUS 8.6 MG/1
2 TABLET ORAL AT BEDTIME
Refills: 0 | Status: DISCONTINUED | OUTPATIENT
Start: 2024-03-04 | End: 2024-03-25

## 2024-03-04 RX ORDER — POLYETHYLENE GLYCOL 3350 17 G/17G
17 POWDER, FOR SOLUTION ORAL DAILY
Refills: 0 | Status: DISCONTINUED | OUTPATIENT
Start: 2024-03-04 | End: 2024-03-15

## 2024-03-04 RX ORDER — GLUCAGON INJECTION, SOLUTION 0.5 MG/.1ML
1 INJECTION, SOLUTION SUBCUTANEOUS ONCE
Refills: 0 | Status: DISCONTINUED | OUTPATIENT
Start: 2024-03-04 | End: 2024-03-18

## 2024-03-04 RX ORDER — METFORMIN HYDROCHLORIDE 850 MG/1
1 TABLET ORAL
Refills: 0 | DISCHARGE

## 2024-03-04 RX ADMIN — SENNA PLUS 2 TABLET(S): 8.6 TABLET ORAL at 21:58

## 2024-03-04 RX ADMIN — ATORVASTATIN CALCIUM 80 MILLIGRAM(S): 80 TABLET, FILM COATED ORAL at 21:57

## 2024-03-04 RX ADMIN — GABAPENTIN 200 MILLIGRAM(S): 400 CAPSULE ORAL at 21:58

## 2024-03-04 RX ADMIN — AMLODIPINE BESYLATE 5 MILLIGRAM(S): 2.5 TABLET ORAL at 05:43

## 2024-03-04 RX ADMIN — CLOPIDOGREL BISULFATE 75 MILLIGRAM(S): 75 TABLET, FILM COATED ORAL at 13:02

## 2024-03-04 RX ADMIN — ENOXAPARIN SODIUM 30 MILLIGRAM(S): 100 INJECTION SUBCUTANEOUS at 21:57

## 2024-03-04 RX ADMIN — Medication 81 MILLIGRAM(S): at 13:01

## 2024-03-04 RX ADMIN — LISINOPRIL 20 MILLIGRAM(S): 2.5 TABLET ORAL at 05:43

## 2024-03-04 NOTE — DISCHARGE NOTE PROVIDER - CARE PROVIDER_API CALL
Jacobo Miramontes  Physical/Rehab Medicine  [address] [place] [id number]  Phone: [phone]  Fax: [phone]  Follow Up Time:

## 2024-03-04 NOTE — H&P ADULT - NSHPPOASURGSITEINCISION_GEN_ALL_CORE
Bleeding that does not stop/Pain not relieved by Medications/Fever greater than (need to indicate Fahrenheit or Celsius)/Wound/Surgical Site with redness, or foul smelling discharge or pus no

## 2024-03-04 NOTE — PATIENT PROFILE ADULT - FALL HARM RISK - HARM RISK INTERVENTIONS
Assistance with ambulation/Assistance OOB with selected safe patient handling equipment/Communicate Risk of Fall with Harm to all staff/Discuss with provider need for PT consult/Monitor gait and stability/Reinforce activity limits and safety measures with patient and family/Tailored Fall Risk Interventions/Visual Cue: Yellow wristband and red socks/Bed in lowest position, wheels locked, appropriate side rails in place/Call bell, personal items and telephone in reach/Instruct patient to call for assistance before getting out of bed or chair/Non-slip footwear when patient is out of bed/[place] to call system/Physically safe environment - no spills, clutter or unnecessary equipment/Purposeful Proactive Rounding/Room/bathroom lighting operational, light cord in reach

## 2024-03-04 NOTE — DISCHARGE NOTE PROVIDER - HOSPITAL COURSE
63F with HTN, HLD, DM2, CVA (10/2023) presents with right sided facial droop, r hemiparesis, aphasia that started 5 days ago and fall      Left MCA distribution acute CVA  MRI brain demonstrates multiple small acute infarctions in the left MCA distribution and chronic ischemic changes from prior strokes.   CTA head/neck: No LVO occlusion   Plan:  ASA 81mg and plavix 75mg x3 months followed by monotherapy   Atorvastatin 80mg   echo wnr  speech /swallow passed will start on diet , toleratig diet  d/c planning to acute rehab     HTN  will start on lisinopril, and norvasac  maintain bp around 140s systolic  low Na diet       DM2  Hb1ac 5.5  will d/f metfomrin to prevent hypoglycemia     constipation  enema  miralax     4. HLD   Statin    63F with HTN, HLD, DM2, CVA (10/2023) presents with right sided facial droop, r hemiparesis, aphasia that started 5 days ago and fall      Left MCA distribution acute CVA  MRI brain demonstrates multiple small acute infarctions in the left MCA distribution and chronic ischemic changes from prior strokes.   CTA head/neck: No LVO occlusion   continue aspirin 81 mg along with Plavix 75 mg, after three months, discontinue the patient's aspirin.   high-dose statin.  echo wnr  speech /swallow passed will start on diet , toleratig diet  d/c planning to acute rehab     HTN  will start on lisinopril, and norvasac  maintain bp around 140s systolic  low Na diet       DM2  Hb1ac 5.5  will d/f metfomrin to prevent hypoglycemia     constipation  enema  miralax     4. HLD   Statin

## 2024-03-04 NOTE — H&P ADULT - ATTENDING COMMENTS
Progress note amended to include my discussions with patient, resident, hospitalist, RN, SW, PT and my findings      - BP slightly above goal but will monitor with increased OOB activity and adjust slowly  - FS controlled  - hypokalemia: repleted 40 meq x 1, BMP in AM    RECENT LABS    Vital Signs Last 24 Hrs  T(C): 36.5 (05 Mar 2024 08:04), Max: 36.9 (04 Mar 2024 16:18)  T(F): 97.7 (05 Mar 2024 08:04), Max: 98.4 (04 Mar 2024 16:18)  HR: 81 (05 Mar 2024 08:04) (72 - 84)  BP: 128/81 (05 Mar 2024 08:04) (128/81 - 155/82)  BP(mean): --  RR: 16 (05 Mar 2024 08:04) (16 - 16)  SpO2: 99% (05 Mar 2024 08:04) (98% - 99%)    Parameters below as of 05 Mar 2024 08:04  Patient On (Oxygen Delivery Method): room air                              12.3   10.35 )-----------( 266      ( 05 Mar 2024 05:50 )             37.5     03-05    141  |  103  |  13  ----------------------------<  103<H>  3.3<L>   |  24  |  0.68    Ca    9.5      05 Mar 2024 05:50    TPro  6.9  /  Alb  3.6  /  TBili  1.2  /  DBili  x   /  AST  18  /  ALT  20  /  AlkPhos  43  03-05      Urinalysis Basic - ( 05 Mar 2024 05:50 )    Color: x / Appearance: x / SG: x / pH: x  Gluc: 103 mg/dL / Ketone: x  / Bili: x / Urobili: x   Blood: x / Protein: x / Nitrite: x   Leuk Esterase: x / RBC: x / WBC x   Sq Epi: x / Non Sq Epi: x / Bacteria: x      CAPILLARY BLOOD GLUCOSE      POCT Blood Glucose.: 117 mg/dL (05 Mar 2024 08:01)  POCT Blood Glucose.: 120 mg/dL (04 Mar 2024 21:55)  POCT Blood Glucose.: 119 mg/dL (04 Mar 2024 16:52)  POCT Blood Glucose.: 108 mg/dL (04 Mar 2024 12:01) Progress note amended to include my discussions with patient, resident, hospitalist, RN, SW, PT and my findings    Patient seen following SLP. She is alert, pleasant, smiling, and able to follow some simple commands about 50% of the time including the use of cues. However, her yes/no are unreliable, even for simple personal information; and she is occasionally perseverative on a word. Only one word utterances obtained. Unable to perform picture ID during SLP in field of 4. She also appears to have some apraxia in speech as well as RUE and LE movements. Sitting balance supported in WC good-. Maintains sustained eye opening throughout exam, mild fatigue toward end.    + right shelby inattention. EOMI intact although she requires additional cues for full horizontal gaze. no nystagmus, Inconsistent response to sensory testing face. +dysarthric, +right facial droop. Difficulty in protruding tongue to assess for weakness but noted to have mild coating thrush. right UE: shoulder elevation 3-/5 elbow flexion 2/5 finger flexion 2-/5. Elbow flexor tone MAS 2, elbow extensor tone 1+ 1 in MCP/IP fingers. No hand swelling, no hyperalgesia. Patient denies any pain.with ROM. right quad at least 3-/5 ankle PF 3/5 DF 2/5 but seems to be affected by attention and motor planning.  Calves soft no TTP. RLE tone WNL no clonus ankle.,     - RUE spasticity: work on positioning, stretch, ROM, HEP with caregiver. Started on gabapentin; if tone increases, may need to consider splinting or antispasticity medication, discussed with patient and partner  - will order nystatin S+S, oral care for thrush, discussed with patient and caregiver  - BP slightly above goal but will monitor with increased OOB activity and adjust slowly  - FS controlled  - hypokalemia: repleted 40 meq x 1, BMP in AM    RECENT LABS    Vital Signs Last 24 Hrs  T(C): 36.5 (05 Mar 2024 08:04), Max: 36.9 (04 Mar 2024 16:18)  T(F): 97.7 (05 Mar 2024 08:04), Max: 98.4 (04 Mar 2024 16:18)  HR: 81 (05 Mar 2024 08:04) (72 - 84)  BP: 128/81 (05 Mar 2024 08:04) (128/81 - 155/82)  BP(mean): --  RR: 16 (05 Mar 2024 08:04) (16 - 16)  SpO2: 99% (05 Mar 2024 08:04) (98% - 99%)    Parameters below as of 05 Mar 2024 08:04  Patient On (Oxygen Delivery Method): room air                              12.3   10.35 )-----------( 266      ( 05 Mar 2024 05:50 )             37.5     03-05    141  |  103  |  13  ----------------------------<  103<H>  3.3<L>   |  24  |  0.68    Ca    9.5      05 Mar 2024 05:50    TPro  6.9  /  Alb  3.6  /  TBili  1.2  /  DBili  x   /  AST  18  /  ALT  20  /  AlkPhos  43  03-05      Urinalysis Basic - ( 05 Mar 2024 05:50 )    Color: x / Appearance: x / SG: x / pH: x  Gluc: 103 mg/dL / Ketone: x  / Bili: x / Urobili: x   Blood: x / Protein: x / Nitrite: x   Leuk Esterase: x / RBC: x / WBC x   Sq Epi: x / Non Sq Epi: x / Bacteria: x      CAPILLARY BLOOD GLUCOSE      POCT Blood Glucose.: 117 mg/dL (05 Mar 2024 08:01)  POCT Blood Glucose.: 120 mg/dL (04 Mar 2024 21:55)  POCT Blood Glucose.: 119 mg/dL (04 Mar 2024 16:52)  POCT Blood Glucose.: 108 mg/dL (04 Mar 2024 12:01)

## 2024-03-04 NOTE — H&P ADULT - NSHPLABSRESULTS_GEN_ALL_CORE
RECENT LABS/IMAGING                        12.3   10.75 )-----------( 265      ( 04 Mar 2024 07:49 )             37.3     03-04    142  |  103  |  9   ----------------------------<  101<H>  3.1<L>   |  23  |  0.61    Ca    9.4      04 Mar 2024 07:49    TPro  7.1  /  Alb  3.9  /  TBili  1.2  /  DBili  x   /  AST  21  /  ALT  21  /  AlkPhos  45  03-04      Urinalysis Basic - ( 04 Mar 2024 07:49 )    Color: x / Appearance: x / SG: x / pH: x  Gluc: 101 mg/dL / Ketone: x  / Bili: x / Urobili: x   Blood: x / Protein: x / Nitrite: x   Leuk Esterase: x / RBC: x / WBC x   Sq Epi: x / Non Sq Epi: x / Bacteria: x    MR Head No Cont (03.01.24 @ 12:35)     IMPRESSION:    1.  Multiple small acute infarctions in the left MCA vascular distribution.  2.  Chronic ischemic changes as discussed above.      US Transthoracic Echocardiogram w/Doppler Complete (03.01.24 @ 11:18)     IMPRESSION:  1. normal left ventricular systolic function with stage I diastolic dysfunction  2. mild tricuspid regurgitation  3. no evidence of interatrial shunt on color Doppler and bubble study       CT Angio Head w/ IV Cont (03.01.24 @ 06:43)     IMPRESSION:  NONCONTRAST HEAD CT SCAN: No CT evidence of acute intracranial pathology.   Right anterior temporal gliosis/encephalomalacia and left pontine chronic   infarcts.    CT ANGIOGRAPHY NECK: The cervical vasculature is patent without evidence   of atherosclerosis, stenosis or dissection.    CT ANGIOGRAPHY BRAIN:  1.  No evidence of a major vessel occlusion or aneurysm about the [place].  2.  No evidence of dural venous sinus thrombosis or an arteriovenous malformation.  3.  Mild focal stenosis of the left M1. RECENT LABS/IMAGING                        12.3   10.75 )-----------( 265      ( 04 Mar 2024 07:49 )             37.3     03-04    142  |  103  |  9   ----------------------------<  101<H>  3.1<L>   |  23  |  0.61    Ca    9.4      04 Mar 2024 07:49    TPro  7.1  /  Alb  3.9  /  TBili  1.2  /  DBili  x   /  AST  21  /  ALT  21  /  AlkPhos  45  03-04        MR Head No Cont (03.01.24 @ 12:35)   1.  Multiple small acute infarctions in the left MCA vascular distribution.  2.  Chronic ischemic changes as discussed above.      US Transthoracic Echocardiogram w/Doppler Complete (03.01.24 @ 11:18)   1. normal left ventricular systolic function with stage I diastolic dysfunction  2. mild tricuspid regurgitation  3. no evidence of interatrial shunt on color Doppler and bubble study      CT Angio Head w/ IV Cont (03.01.24 @ 06:43)   NONCONTRAST HEAD CT SCAN: No CT evidence of acute intracranial pathology.   Right anterior temporal gliosis/encephalomalacia and left pontine chronic   infarcts.    CT ANGIOGRAPHY NECK: The cervical vasculature is patent without evidence   of atherosclerosis, stenosis or dissection.    CT ANGIOGRAPHY BRAIN:  1.  No evidence of a major vessel occlusion or aneurysm about the [place].  2.  No evidence of dural venous sinus thrombosis or an arteriovenous malformation.  3.  Mild focal stenosis of the left M1.

## 2024-03-04 NOTE — DISCHARGE NOTE PROVIDER - NSDCCPCAREPLAN_GEN_ALL_CORE_FT
PRINCIPAL DISCHARGE DIAGNOSIS  Diagnosis: CVA (cerebrovascular accident)  Assessment and Plan of Treatment: asa/plavix   acute rehab       PRINCIPAL DISCHARGE DIAGNOSIS  Diagnosis: CVA (cerebrovascular accident)  Assessment and Plan of Treatment: asa/plavix   acute rehab  continue aspirin 81 mg along with Plavix 75 mg, after three months, discontinue the patient's aspirin.   high-dose statin.

## 2024-03-04 NOTE — SOCIAL WORK PROGRESS NOTE - NSSWPROGRESSNOTE_GEN_ALL_CORE
Pt has been accepted to [facility] acute [facility] for today at 3pm via [facility].  Pts s/o is in agreement with plans and wants to ride in ambulance with patient.  SW will fax request for [employer] medicaid to  as requested by [facility].

## 2024-03-04 NOTE — H&P ADULT - NSHPSOCIALHISTORY_GEN_ALL_CORE
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 Has a tub shower. Owns a walker/cane but does not use it.   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 Has a tub shower. Owns a walker/cane but does not use it.  Lives with partner and their cats. Her partner Connie was responsible for finances, shopping, etc  PTA: Independent in ADLs and ambulation     CURRENT FUNCTIONAL STATUS  Date: 3/4  Bed Mobility: min a, 1 person  Transfers: min a, 2 person  Gait: min a, 2 person, 10ft with hemicane  Upper Body Dressing: min a

## 2024-03-04 NOTE — H&P ADULT - NSHPREVIEWOFSYSTEMS_GEN_ALL_CORE
REVIEW OF SYSTEMS  Constitutional: No fever, No Chills, No fatigue  HEENT: No visual disturbances, No difficulty hearing  Pulm: No cough,  No shortness of breath  Cardio: No chest pain, No palpitations  GI:  No abdominal pain, No nausea, No vomiting, No diarrhea, No constipation  : No dysuria, No frequency, No hematuria  Neuro: No headaches, +right upper extremity weakness, +aphasia  Skin: No itching, No rashes  MSK: No joint swelling, No Neck pain, No back pain, +right arm burning pain  Psych:  No depression, No anxiety

## 2024-03-04 NOTE — DISCHARGE NOTE PROVIDER - NSDCMRMEDTOKEN_GEN_ALL_CORE_FT
amLODIPine 5 mg oral tablet: 1 tab(s) orally once a day  Aspir 81 oral delayed release tablet: 1 tab(s) orally once a day  atorvastatin 80 mg oral tablet: 1 tab(s) orally once a day (at bedtime)  clopidogrel 75 mg oral tablet: 1 tab(s) orally once a day  lisinopril 20 mg oral tablet: 1 tab(s) orally once a day  melatonin 3 mg oral tablet: 1 tab(s) orally once a day (at bedtime) As needed Insomnia

## 2024-03-04 NOTE — H&P ADULT - HISTORY OF PRESENT ILLNESS
This ia a 62 YO female  with PMH of  HTN, HLD, DM2, CVA (10/2023) who presented to  [facility] ED on 3/1/24  following a fall at home. According to the partner, Connie the patient was in her usual state of health up until 5 days ago. On Sunday, pt was noted to have right sided facial, arm and leg weakness, aphasia and episodes of confusion. She took ASA 81mg that she takes on a regular basis.   Symptoms improved and pt was able to ambulate and function at home but felt weak. Yesterday, the patient fell prompting her partner to bring her to the ED.  On arrival, pt noted to have dense right sided facial droop and right sided hemiparesis and aphasia.     MRI brain demonstrates multiple small acute infarctions in the left MCA distribution and chronic ischemic changes from prior strokes. CTA head/neck: No LVO occlusion. She was seen by neurology, ASA & plavix for 3 months.    Patient was evaluated by PM&R and therapy for functional deficits, gait/ADL impairments and acute rehabilitation was recommended. Patient was medically optimized for discharge to [facility] IRU on 3/4/24. This ia a 62 YO female  with PMH of  HTN, HLD, DM2, CVA (10/2023) who presented to  [facility] ED on 3/1/24  following a fall at home. According to the partner, Connie, the patient was in her usual state of health up until 5 days ago. On Sunday, pt was noted to have right sided facial, arm and leg weakness, aphasia and episodes of confusion. She took ASA 81mg that she takes on a regular basis.  Symptoms improved and pt was able to ambulate and function at home but felt weak. The day prior to admission, the patient fell prompting her partner to bring her to the ED.  On arrival, pt noted to have dense right sided facial droop and right sided hemiparesis and aphasia.  MRI brain demonstrated multiple small acute infarctions in the left MCA distribution and chronic ischemic changes from prior strokes. CTA head/neck: No LVO occlusion. She was seen by neurology, ASA & plavix for 3 months. Patient was evaluated by PM&R and therapy for functional deficits, gait/ADL impairments and acute rehabilitation was recommended. Patient was medically optimized for discharge to [facility] IRU on 3/4/24. This ia a 62 YO female  with PMH HTN, HLD, DM2, CVA (10/2023), who presented to  [facility] ED on 3/1/24  following a fall at home. According to the partner, Connie, the patient was in her usual state of health up until 5 days prior to admission when she was noted to have right sided facial, arm and leg weakness, aphasia and episodes of confusion. She took ASA 81mg with improvement of symptoms; pt was able to ambulate and function at home but felt weak. On the day prior to admission, the patient fell prompting her partner to bring her to the ED.    On arrival, pt noted to have dense right sided facial droop and right sided hemiparesis and aphasia.  MRI brain demonstrated multiple small acute infarctions in the left MCA distribution and chronic ischemic changes from prior strokes. CTA head/neck: negative for LVO occlusion. Patient was evaluated by neurology, and recommended for ASA & plavix x 3 months.    Patient was evaluated by PM&R and therapy for functional deficits, gait/ADL impairments and acute rehabilitation was recommended. Patient was discharged to  IRF on 3/4/24. This ia a 62 YO female with PMH HTN, HLD, DM2, CVA (10/2023), who presented to  [facility] ED on 3/1/24  following a fall at home. According to the partner, Connie, the patient was in her usual state of health up until 5 days prior to admission when she was noted to have right sided facial, arm and leg weakness, aphasia and episodes of confusion. She took ASA 81mg with improvement of symptoms; pt was able to ambulate and function at home but felt weak. On the day prior to admission, the patient fell prompting her partner to bring her to the ED.    On arrival, pt noted to have dense right sided facial droop and right sided hemiparesis and aphasia.  MRI brain demonstrated multiple small acute infarctions in the left MCA distribution and chronic ischemic changes from prior strokes. CTA head/neck: negative for LVO occlusion. Patient was evaluated by neurology, and recommended for ASA & plavix x 3 months.    Patient was evaluated by PM&R and therapy for functional deficits, gait/ADL impairments and acute rehabilitation was recommended. Patient was discharged to  IRF on 3/4/24. This ia a 62 YO female RH dominant with PMH HTN, HLD, DM2, CVA (10/2023), who presented to  [facility] ED on 3/1/24  following a fall at home. According to the partner, Connie, the patient was in her usual state of health up until 5 days prior to admission when she was noted to have right sided facial, arm and leg weakness, aphasia and episodes of confusion. She took ASA 81mg with improvement of symptoms; pt was able to ambulate and function at home but felt weak. On the day prior to admission, the patient fell prompting her partner to bring her to the ED.    On arrival, pt noted to have dense right sided facial droop and right sided hemiparesis and aphasia.  MRI brain demonstrated multiple small acute infarctions in the left MCA distribution and chronic ischemic changes from prior strokes. CTA head/neck: negative for LVO occlusion. Patient was evaluated by neurology, and recommended for ASA & plavix x 3 months.    Patient was evaluated by PM&R and therapy for functional deficits, gait/ADL impairments and acute rehabilitation was recommended. Patient was discharged to  IRF on 3/4/24.

## 2024-03-04 NOTE — H&P ADULT - ASSESSMENT
ASSESSMENT/PLAN  This ia a 64 YO female  with PMH of  HTN, HLD, Diabetes Mellitus type 2, CVA (10/2023) who presented to [facility] ED on 3/1/24  following a fall at home and right sided facial droop, right sided weakness, and aphasia that started 5 days prior. Imaging showed  multiple small acute infarctions in the left MCA distribution. Patient now with gait Instability, ADL impairments and Functional impairments.    #CVA  - multiple small acute infarctions in the left MCA distribution and chronic ischemic changes from prior strokes  - right sided facial droop, right sided weakness, and aphasia   - Start Comprehensive Rehab Program: PT/OT/ST, 3hours daily and 5 days weekly  - PT: Focused on improving strength, endurance, coordination, balance, functional mobility, and transfers  - OT: Focused on improving strength, fine motor skills, coordination, posture and ADLs.    - ST: to diagnose and treat deficits in swallowing, cognition and communication.   - ASA & plavix for 3 months, discontinue aspirin after 3 months  - High dose statin    #HTN  - Amlodipine 5mg daily  - Lisinopril 20mg daily    #HLD  - Lipitor 80mg daily    #Diabetes Mellitus Type 2  - ISS and FS  - Admelog and Lantus  - A1c 5.5 on 3/2/24    #Pain management  - Tylenol PRN    #DVT ppx  - Lovenox,  SCD, TEDs    #Bowel Regimen  - Senna, miralax PRN    #Bladder management  - BS on admission, and q 8 hours (SC if > 400)  - Monitor UO    #FEN   - Diet: Pureed    #Skin:  - Skin on admission: ***    #Dysphagia    - SLP: evaluation and treatment    #Sleep:   - Maintain quiet hours and low stim environment.  - Melatonin 3mg nightly PRN to maximize participation in therapy during the day.     #Precaution  - Fall, Aspiration    #GOC  CODE STATUS: FULL CODE    Outpatient Follow-up (Specialty/Name of physician):    Jacobo Miramontes  Physical/Rehab Medicine  [address] [place] [id number]  Phone: [phone]  Fax: [phone]  Follow Up Time:      MEDICAL PROGNOSIS: GOOD            REHAB POTENTIAL: GOOD             ESTIMATED DISPOSITION: HOME WITH HOME CARE            ELOS: 10-14 Days   EXPECTED THERAPY:     P.T. 1hr/day       O.T. 1hr/day      S.L.P. 1hr/day     P&O Unnecessary     EXP FREQUENCY: 5 days per 7 day period     PRESCREEN COMPARISON:   I have reviewed the prescreen information and I have found no relevant changes between the preadmission screening and my post admission evaluation     RATIONALE FOR INPATIENT ADMISSION - Patient demonstrates the following: (check all that apply)  [X] Medically appropriate for rehabilitation admission  [X] Has attainable rehab goals with an appropriate initial discharge plan  [X] Has rehabilitation potential (expected to make a significant improvement within a reasonable period of time)   [X] Requires close medical management by a rehab physician, rehab nursing care, Hospitalist and comprehensive interdisciplinary team (including PT, OT, & or SLP, Prosthetics and Orthotics)   62 YO female  with PMH HTN, HLD, DM2, CVA (10/2023), who presented to  [facility] ED on 3/1/24 with right sided facial, right arm and leg weakness, aphasia and episodes of confusion as well as a fall. MRI brain demonstrated multiple small acute infarctions in the left MCA distribution  Patient now admitted with gait Instability, ADL impairments and Functional impairments.    # left MCA CVA with  - multiple small acute infarctions in the left MCA distribution and chronic ischemic changes from prior strokes  - ASA & plavix for 3 months, discontinue aspirin after 3 months  - High dose statin  - Start Comprehensive Rehab Program: PT/OT/ST, 3hours daily and 5 days weekly  - Precautions: cardiac, fall, DM , aspiration, AMS    # HTN  - Amlodipine 5mg daily  - Lisinopril 20mg daily  - monitor BP, hospitalist consult    # HLD  - Lipitor 80mg daily    # Diabetes Mellitus Type 2 without complications  - A1c 5.5 on 3/2/24  - ISS and FS  - Admelog and Lantus  - nutrition and hospitalist consults    # Pain management  - Tylenol PRN    # Bowel Regimen  - Senna, miralax PRN    # Bladder management  - BS on admission, and q 8 hours (SC if > 400)  - Monitor UO    # FEN   - Diet: Pureed  - SLP and nutrition consults    # Skin:  - Skin on admission: ***      # Sleep:   - Maintain quiet hours and low stim environment.  - Melatonin 3mg nightly PRN to maximize participation in therapy during the day.     # DVT ppx  - Lovenox,  - SCD, TEDs    # LABS  CBC CMP 3/5      #GOC  CODE STATUS: FULL CODE    Outpatient Follow-up (Specialty/Name of physician):    Jacobo Miramontes  Physical/Rehab Medicine  [address] [place] [id number]  Phone: [phone]  Fax: [phone]  Follow Up Time:      MEDICAL PROGNOSIS: GOOD            REHAB POTENTIAL: GOOD             ESTIMATED DISPOSITION: HOME WITH HOME CARE            ELOS: 10-14 Days   EXPECTED THERAPY:     P.T. 1hr/day       O.T. 1hr/day      S.L.P. 1hr/day     P&O Unnecessary     EXP FREQUENCY: 5 days per 7 day period     PRESCREEN COMPARISON:   I have reviewed the prescreen information and I have found no relevant changes between the preadmission screening and my post admission evaluation     RATIONALE FOR INPATIENT ADMISSION - Patient demonstrates the following: (check all that apply)  [X] Medically appropriate for rehabilitation admission  [X] Has attainable rehab goals with an appropriate initial discharge plan  [X] Has rehabilitation potential (expected to make a significant improvement within a reasonable period of time)   [X] Requires close medical management by a rehab physician, rehab nursing care, Hospitalist and comprehensive interdisciplinary team (including PT, OT, & or SLP, Prosthetics and Orthotics)   64 YO female  with PMH HTN, HLD, DM2, CVA (10/2023), who presented to  [facility] ED on 3/1/24 with right sided facial, right arm and leg weakness, aphasia and episodes of confusion as well as a fall. MRI brain demonstrated multiple small acute infarctions in the left MCA distribution  Patient now admitted with gait Instability, ADL impairments and Functional impairments.    # left MCA CVA with  - MRI 3/1: Multiple foci of diffusion restriction scattered throughout the left MCA vascular distribution, compatible with acute infarctions. Chronic lacunar infarctions in the right centrum semiovale/corona radiata and left hemipons. Encephalomalacia/gliotic change in the anterior right temporal lobe. Foci of susceptibility artifact in the right basal ganglia, likely sequela of prior microhemorrhages.  - ASA & plavix for 3 months, discontinue aspirin after 3 months  - High dose statin  - Start Comprehensive Rehab Program: PT/OT/ST, 3hours daily and 5 days weekly  - Precautions: cardiac, fall, DM , aspiration, AMS    # HTN  - Amlodipine 5mg daily  - Lisinopril 20mg daily  - monitor BP, hospitalist consult    # HLD  - Lipitor 80mg daily    # Diabetes Mellitus Type 2 without complications  - A1c 5.5 on 3/2/24  - ISS and FS  - Admelog and Lantus  - nutrition and hospitalist consults    # Pain management  - Tylenol PRN    # Bowel Regimen  - Senna, miralax PRN    # Bladder management  - BS on admission, and q 8 hours (SC if > 400)  - Monitor UO    # FEN   - Diet: Pureed  - SLP and nutrition consults    # Skin:  - Skin on admission: ***      # Sleep:   - Maintain quiet hours and low stim environment.  - Melatonin 3mg nightly PRN to maximize participation in therapy during the day.     # DVT ppx  - Lovenox,  - SCD, TEDs    # LABS  CBC CMP 3/5      #GOC  CODE STATUS: FULL CODE    Outpatient Follow-up (Specialty/Name of physician):    Jacobo Miramontes  Physical/Rehab Medicine  [address] [place] [id number]  Phone: [phone]  Fax: [phone]  Follow Up Time:      MEDICAL PROGNOSIS: GOOD            REHAB POTENTIAL: GOOD             ESTIMATED DISPOSITION: HOME WITH HOME CARE            ELOS: 10-14 Days   EXPECTED THERAPY:     P.T. 1hr/day       O.T. 1hr/day      S.L.P. 1hr/day     P&O Unnecessary     EXP FREQUENCY: 5 days per 7 day period     PRESCREEN COMPARISON:   I have reviewed the prescreen information and I have found no relevant changes between the preadmission screening and my post admission evaluation     RATIONALE FOR INPATIENT ADMISSION - Patient demonstrates the following: (check all that apply)  [X] Medically appropriate for rehabilitation admission  [X] Has attainable rehab goals with an appropriate initial discharge plan  [X] Has rehabilitation potential (expected to make a significant improvement within a reasonable period of time)   [X] Requires close medical management by a rehab physician, rehab nursing care, Hospitalist and comprehensive interdisciplinary team (including PT, OT, & or SLP, Prosthetics and Orthotics)   64 YO female  with PMH HTN, HLD, DM2, CVA (10/2023), who presented to [facility] ED on 3/1/24 with right sided facial, right arm and leg weakness, aphasia and episodes of confusion as well as a fall. MRI brain demonstrated multiple small acute infarctions in the left MCA distribution  Patient now admitted with gait Instability, ADL impairments and Functional impairments.    #Left MCA CVA with expressive aphasia, right hemiparesis, and right facial droop  - MRI 3/1: Multiple foci of diffusion restriction scattered throughout the left MCA vascular distribution, compatible with acute infarctions. Chronic lacunar infarctions in the right centrum semiovale/corona radiata and left hemipons. Encephalomalacia/gliotic change in the anterior right temporal lobe. Foci of susceptibility artifact in the right basal ganglia, likely sequela of prior microhemorrhages.  - ASA & plavix for 3 months, discontinue aspirin after 3 months  - High dose statin  - Start Comprehensive Rehab Program: PT/OT/ST, 3hours daily and 5 days weekly  - Precautions: fall, aspiration    #Pain management  - Tylenol PRN  - RUE neuropathic pain --> will start gabapentin 200 mg QHS tonight and titrate up as tolerated to patient response    # HTN  - Amlodipine 5mg daily  - Lisinopril 20mg daily  - monitor BP, hospitalist consult    # HLD  - Lipitor 80mg daily    # Diabetes Mellitus Type 2 without complications  - A1c 5.5 on 3/2/24  - ISS and FS  - Admelog and Lantus  - nutrition and hospitalist consults    # Bowel Regimen  - Senna, miralax PRN    # Bladder management  - BS on admission, and q 8 hours (SC if > 400)  - Monitor UO    # FEN   - Diet: Pureed  - SLP and nutrition consults    # Skin:  - Skin on admission: right hip ecchymosis    # Sleep:   - Maintain quiet hours and low stim environment.  - Melatonin 3mg nightly PRN to maximize participation in therapy during the day.     # DVT ppx  - Lovenox    # LABS  CBC CMP 3/5    #GOC  CODE STATUS: FULL CODE    Outpatient Follow-up (Specialty/Name of physician):    Jacobo Miramontes  Physical/Rehab Medicine  [address] [place] [address]  Phone: [phone]  Fax: [phone]  Follow Up Time:      MEDICAL PROGNOSIS: GOOD            REHAB POTENTIAL: GOOD             ESTIMATED DISPOSITION: HOME WITH HOME CARE            ELOS: 10-14 Days   EXPECTED THERAPY:     P.T. 1hr/day       O.T. 1hr/day      S.L.P. 1hr/day     P&O Unnecessary     EXP FREQUENCY: 5 days per 7 day period     PRESCREEN COMPARISON:   I have reviewed the prescreen information and I have found no relevant changes between the preadmission screening and my post admission evaluation     RATIONALE FOR INPATIENT ADMISSION - Patient demonstrates the following: (check all that apply)  [X] Medically appropriate for rehabilitation admission  [X] Has attainable rehab goals with an appropriate initial discharge plan  [X] Has rehabilitation potential (expected to make a significant improvement within a reasonable period of time)   [X] Requires close medical management by a rehab physician, rehab nursing care, Hospitalist and comprehensive interdisciplinary team (including PT, OT, & or SLP, Prosthetics and Orthotics)   64 YO female  with PMH HTN, HLD, DM2, CVA (10/2023), who presented to [facility] ED on 3/1/24 with right sided facial, right arm and leg weakness, aphasia and episodes of confusion as well as a fall. MRI brain demonstrated multiple small acute infarctions in the left MCA distribution  Patient now admitted with gait Instability, ADL impairments and Functional impairments.    #Left MCA CVA with expressive aphasia, right hemiparesis, and right facial droop  - MRI 3/1: Multiple foci of diffusion restriction scattered throughout the left MCA vascular distribution, compatible with acute infarctions. Chronic lacunar infarctions in the right centrum semiovale/corona radiata and left hemipons. Encephalomalacia/gliotic change in the anterior right temporal lobe. Foci of susceptibility artifact in the right basal ganglia, likely sequela of prior microhemorrhages.  - ASA & plavix for 3 months, discontinue aspirin after 3 months  - High dose statin  - Start Comprehensive Rehab Program: PT/OT/ST, 3hours daily and 5 days weekly  - Precautions: fall, aspiration, cardiac, DM, AMS    #Pain management  - Tylenol PRN  - RUE neuropathic pain --> will start gabapentin 200 mg QHS tonight and titrate up as tolerated to patient response    # HTN  - Amlodipine 5mg daily  - Lisinopril 20mg daily  - monitor BP, hospitalist consult    # HLD  - Lipitor 80mg daily    # Diabetes Mellitus Type 2 without complications  - A1c 5.5 on 3/2/24  - ISS and FS  - Admelog and Lantus  - nutrition and hospitalist consults    # Bowel Regimen  - Senna, miralax PRN    # Bladder management  - BS on admission, and q 8 hours (SC if > 400)  - Monitor UO    # FEN   - Diet: Pureed  - SLP and nutrition consults    # Skin:  - Skin on admission: right hip ecchymosis    # Sleep:   - Maintain quiet hours and low stim environment.  - Melatonin 3mg nightly PRN to maximize participation in therapy during the day.     # DVT ppx  - Lovenox    # LABS  CBC CMP 3/5    #GOC  CODE STATUS: FULL CODE    Outpatient Follow-up (Specialty/Name of physician):    Jacobo Miramontes  Physical/Rehab Medicine  [address] [place] [address]  Phone: [phone]  Fax: [phone]  Follow Up Time:      MEDICAL PROGNOSIS: GOOD            REHAB POTENTIAL: GOOD             ESTIMATED DISPOSITION: HOME WITH HOME CARE            ELOS: 10-14 Days   EXPECTED THERAPY:     P.T. 1hr/day       O.T. 1hr/day      S.L.P. 1hr/day     P&O Unnecessary     EXP FREQUENCY: 5 days per 7 day period     PRESCREEN COMPARISON:   I have reviewed the prescreen information and I have found no relevant changes between the preadmission screening and my post admission evaluation     RATIONALE FOR INPATIENT ADMISSION - Patient demonstrates the following: (check all that apply)  [X] Medically appropriate for rehabilitation admission  [X] Has attainable rehab goals with an appropriate initial discharge plan  [X] Has rehabilitation potential (expected to make a significant improvement within a reasonable period of time)   [X] Requires close medical management by a rehab physician, rehab nursing care, Hospitalist and comprehensive interdisciplinary team (including PT, OT, & or SLP, Prosthetics and Orthotics)   64 YO female with PMH HTN, HLD, DM2, CVA (10/2023), who presented to [facility] ED on 3/1/24 with right sided facial, right arm and leg weakness, aphasia and episodes of confusion as well as a fall. MRI brain demonstrated multiple small acute infarctions in the left MCA distribution  Patient now admitted with gait Instability, ADL impairments and Functional impairments.    #Left MCA CVA with expressive aphasia, right hemiparesis, and right facial droop  - MRI 3/1: Multiple foci of diffusion restriction scattered throughout the left MCA vascular distribution, compatible with acute infarctions. Chronic lacunar infarctions in the right centrum semiovale/corona radiata and left hemipons. Encephalomalacia/gliotic change in the anterior right temporal lobe. Foci of susceptibility artifact in the right basal ganglia, likely sequela of prior microhemorrhages.  - ASA & plavix for 3 months, discontinue aspirin after 3 months  - High dose statin  - Start Comprehensive Rehab Program: PT/OT/ST, 3hours daily and 5 days weekly  - Precautions: fall, aspiration, cardiac, DM, AMS    #Pain management  - Tylenol PRN  - RUE neuropathic pain --> will start gabapentin 200 mg QHS tonight and titrate up as tolerated to patient response    # HTN  - Amlodipine 5mg daily  - Lisinopril 20mg daily  - monitor BP, hospitalist consult    # HLD  - Lipitor 80mg daily    # Diabetes Mellitus Type 2 without complications  - A1c 5.5 on 3/2/24  - ISS and FS  - Admelog and Lantus  - nutrition and hospitalist consults    # Bowel Regimen  - Senna, miralax PRN    # Bladder management  - BS on admission, and q 8 hours (SC if > 400)  - Monitor UO    # FEN   - Diet: Pureed  - SLP and nutrition consults    # Skin:  - Skin on admission: right hip ecchymosis    # Sleep:   - Maintain quiet hours and low stim environment.  - Melatonin 3mg nightly PRN to maximize participation in therapy during the day.     # DVT ppx  - Lovenox    # LABS  CBC CMP 3/5    #GOC  CODE STATUS: FULL CODE    Outpatient Follow-up (Specialty/Name of physician):    Jacobo Miramontes  Physical/Rehab Medicine  [address] [place] [phone]  Phone: [phone]  Fax: [phone]  Follow Up Time:      MEDICAL PROGNOSIS: GOOD            REHAB POTENTIAL: GOOD             ESTIMATED DISPOSITION: HOME WITH HOME CARE            ELOS: 10-14 Days   EXPECTED THERAPY:     P.T. 1hr/day       O.T. 1hr/day      S.L.P. 1hr/day     P&O Unnecessary     EXP FREQUENCY: 5 days per 7 day period     PRESCREEN COMPARISON:   I have reviewed the prescreen information and I have found no relevant changes between the preadmission screening and my post admission evaluation     RATIONALE FOR INPATIENT ADMISSION - Patient demonstrates the following: (check all that apply)  [X] Medically appropriate for rehabilitation admission  [X] Has attainable rehab goals with an appropriate initial discharge plan  [X] Has rehabilitation potential (expected to make a significant improvement within a reasonable period of time)   [X] Requires close medical management by a rehab physician, rehab nursing care, Hospitalist and comprehensive interdisciplinary team (including PT, OT, & or SLP, Prosthetics and Orthotics)   64 YO female with PMH HTN, HLD, DM2, CVA (10/2023), who presented to [facility] ED on 3/1/24 with right sided facial, right arm and leg weakness, aphasia and episodes of confusion as well as a fall. MRI brain demonstrated multiple small acute infarctions in the left MCA distribution  Patient now admitted with gait Instability, ADL impairments and Functional impairments.    #Left MCA CVA with expressive aphasia, right hemiparesis, and right facial droop  - MRI 3/1: Multiple foci of diffusion restriction scattered throughout the left MCA vascular distribution, compatible with acute infarctions. Chronic lacunar infarctions in the right centrum semiovale/corona radiata and left hemipons. Encephalomalacia/gliotic change in the anterior right temporal lobe. Foci of susceptibility artifact in the right basal ganglia, likely sequela of prior microhemorrhages.  - ASA & plavix for 3 months, discontinue aspirin after 3 months  - High dose statin  - Start Comprehensive Rehab Program: PT/OT/ST, 3hours daily and 5 days weekly  - Precautions: fall, aspiration, cardiac, DM, AMS    #Pain management  - Tylenol PRN  - RUE neuropathic pain --> will start gabapentin 200 mg QHS tonight and titrate up as tolerated to patient response    # HTN  - Amlodipine 5mg daily  - Lisinopril 20mg daily  - monitor BP, hospitalist consult  - bP (128/81 - 155/82) 3/5    # HLD  - Lipitor 80mg daily    # Diabetes Mellitus Type 2 without complications  - A1c 5.5 on 3/2/24  - ISS and FS  - Admelog and Lantus  - nutrition and hospitalist consults  - -120 3/5    # hypokalemia  - K+ 3.3 3/5. Replete 40 meq x 1  - BMP in AM 3/6    # Bowel Regimen  - Senna, miralax PRN    # Bladder management  - BS on admission, and q 8 hours (SC if > 400)  - Monitor UO    # FEN   - Diet: Pureed  - SLP and nutrition consults    # Skin:  - Skin on admission: right hip ecchymosis    # Sleep:   - Maintain quiet hours and low stim environment.  - Melatonin 3mg nightly PRN to maximize participation in therapy during the day.     # DVT ppx  - Lovenox    # LABS  CBC CMP 3/5: reviewed  BMP 3/6    #GOC  CODE STATUS: FULL CODE    Outpatient Follow-up (Specialty/Name of physician):    Jacobo Miramontes  Physical/Rehab Medicine  [address] [place] [id number]  Phone: [phone]  Fax: [phone]  Follow Up Time:      MEDICAL PROGNOSIS: GOOD            REHAB POTENTIAL: GOOD             ESTIMATED DISPOSITION: HOME WITH HOME CARE            ELOS: 10-14 Days   EXPECTED THERAPY:     P.T. 1hr/day       O.T. 1hr/day      S.L.P. 1hr/day     P&O Unnecessary     EXP FREQUENCY: 5 days per 7 day period     PRESCREEN COMPARISON:   I have reviewed the prescreen information and I have found no relevant changes between the preadmission screening and my post admission evaluation     RATIONALE FOR INPATIENT ADMISSION - Patient demonstrates the following: (check all that apply)  [X] Medically appropriate for rehabilitation admission  [X] Has attainable rehab goals with an appropriate initial discharge plan  [X] Has rehabilitation potential (expected to make a significant improvement within a reasonable period of time)   [X] Requires close medical management by a rehab physician, rehab nursing care, Hospitalist and comprehensive interdisciplinary team (including PT, OT, & or SLP, Prosthetics and Orthotics)   62 YO female with PMH HTN, HLD, DM2, CVA (10/2023), who presented to [facility] ED on 3/1/24 with right sided facial, right arm and leg weakness, aphasia and episodes of confusion as well as a fall. MRI brain demonstrated multiple small acute infarctions in the left MCA distribution  Patient now admitted with gait Instability, ADL impairments and Functional impairments.    #Left MCA CVA with expressive aphasia, right hemiparesis, and right facial droop  - MRI 3/1: Multiple foci of diffusion restriction scattered throughout the left MCA vascular distribution, compatible with acute infarctions. Chronic lacunar infarctions in the right centrum semiovale/corona radiata and left hemipons. Encephalomalacia/gliotic change in the anterior right temporal lobe. Foci of susceptibility artifact in the right basal ganglia, likely sequela of prior microhemorrhages.  - ASA & plavix for 3 months, discontinue aspirin after 3 months  - High dose statin  - Start Comprehensive Rehab Program: PT/OT/ST, 3hours daily and 5 days weekly  - Precautions: fall, aspiration, cardiac, DM, AMS    #Pain management  - Tylenol PRN  - RUE neuropathic pain --> will start gabapentin 200 mg QHS tonight and titrate up as tolerated to patient response    # HTN  - Amlodipine 5mg daily  - Lisinopril 20mg daily  - monitor BP, hospitalist consult  - bP (128/81 - 155/82) 3/5    # HLD  - Lipitor 80mg daily    # Diabetes Mellitus Type 2 without complications  - A1c 5.5 on 3/2/24  - ISS and FS  - Admelog and Lantus  - nutrition and hospitalist consults  - -120 3/5    # hypokalemia  - K+ 3.3 3/5. Replete 40 meq x 1  - BMP in AM 3/6    # Bowel Regimen  - Senna, miralax PRN    # Bladder management  - BS on admission, and q 8 hours (SC if > 400)  - Monitor UO    # FEN   - Diet: Pureed--> upgraded to minced/moist and thin liquids 3/5  - SLP and nutrition consults    # Skin:  - Skin on admission: right hip ecchymosis    # Sleep:   - Maintain quiet hours and low stim environment.  - Melatonin 3mg nightly PRN to maximize participation in therapy during the day.     # DVT ppx  - Lovenox    # LABS  CBC CMP 3/5: reviewed  BMP 3/6    #GOC  CODE STATUS: FULL CODE    Outpatient Follow-up (Specialty/Name of physician):    Jacobo Miramontes  Physical/Rehab Medicine  [address] [place] [id number]  Phone: [phone]  Fax: [phone]  Follow Up Time:      MEDICAL PROGNOSIS: GOOD            REHAB POTENTIAL: GOOD             ESTIMATED DISPOSITION: HOME WITH HOME CARE            ELOS: 10-14 Days   EXPECTED THERAPY:     P.T. 1hr/day       O.T. 1hr/day      S.L.P. 1hr/day     P&O Unnecessary     EXP FREQUENCY: 5 days per 7 day period     PRESCREEN COMPARISON:   I have reviewed the prescreen information and I have found no relevant changes between the preadmission screening and my post admission evaluation     RATIONALE FOR INPATIENT ADMISSION - Patient demonstrates the following: (check all that apply)  [X] Medically appropriate for rehabilitation admission  [X] Has attainable rehab goals with an appropriate initial discharge plan  [X] Has rehabilitation potential (expected to make a significant improvement within a reasonable period of time)   [X] Requires close medical management by a rehab physician, rehab nursing care, Hospitalist and comprehensive interdisciplinary team (including PT, OT, & or SLP, Prosthetics and Orthotics)   62 YO female with PMH HTN, HLD, DM2, CVA (10/2023), who presented to [facility] ED on 3/1/24 with right sided facial, right arm and leg weakness, aphasia and episodes of confusion as well as a fall. MRI brain demonstrated multiple small acute infarctions in the left MCA distribution  Patient now admitted with gait Instability, ADL impairments and Functional impairments.    #Left MCA CVA with expressive aphasia, right hemiparesis, and right facial droop  - MRI 3/1: Multiple foci of diffusion restriction scattered throughout the left MCA vascular distribution, compatible with acute infarctions. Chronic lacunar infarctions in the right centrum semiovale/corona radiata and left hemipons. Encephalomalacia/gliotic change in the anterior right temporal lobe. Foci of susceptibility artifact in the right basal ganglia, likely sequela of prior microhemorrhages.  - ASA & plavix for 3 months, discontinue aspirin after 3 months  - High dose statin  - Start Comprehensive Rehab Program: PT/OT/ST, 3hours daily and 5 days weekly  - Precautions: fall, aspiration, cardiac, DM, AMS    #Pain management  - Tylenol PRN  - RUE neuropathic pain --> will start gabapentin 200 mg QHS tonight and titrate up as tolerated to patient response    # HTN  - Amlodipine 5mg daily  - Lisinopril 20mg daily  - monitor BP, hospitalist consult  - bP (128/81 - 155/82) 3/5    # HLD  - Lipitor 80mg daily    # Diabetes Mellitus Type 2 without complications  - A1c 5.5 on 3/2/24  - ISS and FS  - Admelog and Lantus  - nutrition and hospitalist consults  - -120 3/5    # hypokalemia  - K+ 3.3 3/5. Replete 40 meq x 1  - BMP in AM 3/6    # hypomagnesemia  - Mg++ 1.5  - Mg oxide supplemented x 2 days  - repeat Mg++ level 3/7    # Bowel Regimen  - Senna, miralax PRN    # Bladder management  - BS on admission, and q 8 hours (SC if > 400)  - Monitor UO    # FEN   - Diet: Pureed--> upgraded to minced/moist and thin liquids 3/5  - SLP and nutrition consults    # Skin:  - Skin on admission: right hip ecchymosis    # Sleep:   - Maintain quiet hours and low stim environment.  - Melatonin 3mg nightly PRN to maximize participation in therapy during the day.     # DVT ppx  - Lovenox    # LABS  CBC CMP 3/5: reviewed  BMP 3/6  CBC CMP Mg++ 3/7    #GOC  CODE STATUS: FULL CODE    Outpatient Follow-up (Specialty/Name of physician):    Jacobo Miramontes  Physical/Rehab Medicine  [address] [place] [id number]  Phone: [phone]  Fax: [phone]  Follow Up Time:      MEDICAL PROGNOSIS: GOOD            REHAB POTENTIAL: GOOD             ESTIMATED DISPOSITION: HOME WITH HOME CARE            ELOS: 21 Days   EXPECTED THERAPY:     P.T. 1hr/day       O.T. 1hr/day      S.L.P. 1hr/day     P&O possible splint right hand, elbow    EXP FREQUENCY: 5 days per 7 day period     PRESCREEN COMPARISON:   I have reviewed the prescreen information and I have found no relevant changes between the preadmission screening and my post admission evaluation     RATIONALE FOR INPATIENT ADMISSION - Patient demonstrates the following: (check all that apply)  [X] Medically appropriate for rehabilitation admission  [X] Has attainable rehab goals with an appropriate initial discharge plan  [X] Has rehabilitation potential (expected to make a significant improvement within a reasonable period of time)   [X] Requires close medical management by a rehab physician, rehab nursing care, Hospitalist and comprehensive interdisciplinary team (including PT, OT, & or SLP, Prosthetics and Orthotics)

## 2024-03-04 NOTE — H&P ADULT - NSHPPHYSICALEXAM_GEN_ALL_CORE
Gen - NAD, Comfortable  HEENT - NCAT, EOMI  Neck - Supple  Pulm - CTAB, No wheeze, No rhonchi, No crackles  Cardiovascular - RRR, S1S2, No murmurs  Abdomen - Soft, NT/ND, +BS  Extremities - No C/C/E, No calf tenderness  Neuro-     Cognitive - alert, not oriented to self, place, year (says yes to March with multiple choice), follows simple commands inconsistently     Communication - expressive aphasia, dysarthria, naming and repetition impaired      Attention: unable to state days of the week backwards     Memory: unable to recall 3 objects immediate     Cranial Nerves - right facial droop, EOMI     Motor -                     LEFT    UE - ShAB 4-/5, EF 4-/5, EE 4-/5, WE 3/5,  3/5                    RIGHT UE - ShAB 5/5, EF 5/5, EE 5/5, WE 5/5,  5/5                    LEFT    LE - HF 5/5, KE 5/5, DF 5/5, PF 5/5                    RIGHT LE - HF 4-/5, KE 4-/5, DF 4-/5, PF 4-/5        Sensory - unable to accurately assess     Reflexes - Negative Guaman b/l, Negative clonus b/l  Psychiatric - Mood stable, Affect WNL  Skin: right hip ecchymosis Gen - NAD, Comfortable, smiling. +expressive and receptive aphasia, unable to reliably use yes/no board even for simple personal questions    HEENT - NCAT, EOMI +right facial droop. no eye injection or discharge  + mild thrush tongue    Pulm - CTAB, No wheeze, No rhonchi, No crackles  Cardiovascular - RRR, S1S2, No murmurs  Abdomen - Soft, NT/ND, +BS  Extremities - No C/C/E, No calf tenderness  Neuro-     Cognitive - alert, not oriented to self, place, year (says yes to March with multiple choice), follows simple commands inconsistently, has difficulty at times with right sided activities even with tactile and visual cues  +apraxia right side     Communication - expressive aphasia, dysarthria, naming and repetition impaired      Attention: unable to state days of the week backwards     Memory: unable to recall 3 objects immediate     Cranial Nerves - right facial droop, EOMI     Motor -                     LEFT    UE - ShAB 4-/5, EF 4-/5, EE 4-/5, WE 3/5,  3/5                    RIGHT UE - ShAB 5/5, EF 5/5, EE 5/5, WE 5/5,  5/5                    LEFT    LE - HF 5/5, KE 5/5, DF 5/5, PF 5/5                    RIGHT LE - HF 4-/5, KE 4-/5, DF 4-/5, PF 4-/5        Sensory - unable to accurately assess     Reflexes - Negative Guaman b/l, Negative clonus b/l  Psychiatric - Mood stable, Affect WNL  Skin: right hip ecchymosis

## 2024-03-05 PROBLEM — I10 ESSENTIAL (PRIMARY) HYPERTENSION: Chronic | Status: ACTIVE | Noted: 2024-03-01

## 2024-03-05 PROBLEM — E11.9 TYPE 2 DIABETES MELLITUS WITHOUT COMPLICATIONS: Chronic | Status: ACTIVE | Noted: 2024-03-01

## 2024-03-05 LAB
ALBUMIN SERPL ELPH-MCNC: 3.6 G/DL — SIGNIFICANT CHANGE UP (ref 3.3–5)
ALP SERPL-CCNC: 43 U/L — SIGNIFICANT CHANGE UP (ref 40–120)
ALT FLD-CCNC: 20 U/L — SIGNIFICANT CHANGE UP (ref 10–45)
ANION GAP SERPL CALC-SCNC: 14 MMOL/L — SIGNIFICANT CHANGE UP (ref 5–17)
AST SERPL-CCNC: 18 U/L — SIGNIFICANT CHANGE UP (ref 10–40)
BASOPHILS # BLD AUTO: 0.04 K/UL — SIGNIFICANT CHANGE UP (ref 0–0.2)
BASOPHILS NFR BLD AUTO: 0.4 % — SIGNIFICANT CHANGE UP (ref 0–2)
BILIRUB SERPL-MCNC: 1.2 MG/DL — SIGNIFICANT CHANGE UP (ref 0.2–1.2)
BUN SERPL-MCNC: 13 MG/DL — SIGNIFICANT CHANGE UP (ref 7–23)
CALCIUM SERPL-MCNC: 9.5 MG/DL — SIGNIFICANT CHANGE UP (ref 8.4–10.5)
CHLORIDE SERPL-SCNC: 103 MMOL/L — SIGNIFICANT CHANGE UP (ref 96–108)
CO2 SERPL-SCNC: 24 MMOL/L — SIGNIFICANT CHANGE UP (ref 22–31)
CREAT SERPL-MCNC: 0.68 MG/DL — SIGNIFICANT CHANGE UP (ref 0.5–1.3)
EGFR: 98 ML/MIN/1.73M2 — SIGNIFICANT CHANGE UP
EOSINOPHIL # BLD AUTO: 0.05 K/UL — SIGNIFICANT CHANGE UP (ref 0–0.5)
EOSINOPHIL NFR BLD AUTO: 0.5 % — SIGNIFICANT CHANGE UP (ref 0–6)
GLUCOSE BLDC GLUCOMTR-MCNC: 117 MG/DL — HIGH (ref 70–99)
GLUCOSE SERPL-MCNC: 103 MG/DL — HIGH (ref 70–99)
HCT VFR BLD CALC: 37.5 % — SIGNIFICANT CHANGE UP (ref 34.5–45)
HGB BLD-MCNC: 12.3 G/DL — SIGNIFICANT CHANGE UP (ref 11.5–15.5)
IMM GRANULOCYTES NFR BLD AUTO: 0.3 % — SIGNIFICANT CHANGE UP (ref 0–0.9)
LYMPHOCYTES # BLD AUTO: 3.72 K/UL — HIGH (ref 1–3.3)
LYMPHOCYTES # BLD AUTO: 35.9 % — SIGNIFICANT CHANGE UP (ref 13–44)
MAGNESIUM SERPL-MCNC: 1.5 MG/DL — LOW (ref 1.6–2.6)
MCHC RBC-ENTMCNC: 28.5 PG — SIGNIFICANT CHANGE UP (ref 27–34)
MCHC RBC-ENTMCNC: 32.8 GM/DL — SIGNIFICANT CHANGE UP (ref 32–36)
MCV RBC AUTO: 87 FL — SIGNIFICANT CHANGE UP (ref 80–100)
MONOCYTES # BLD AUTO: 0.56 K/UL — SIGNIFICANT CHANGE UP (ref 0–0.9)
MONOCYTES NFR BLD AUTO: 5.4 % — SIGNIFICANT CHANGE UP (ref 2–14)
NEUTROPHILS # BLD AUTO: 5.95 K/UL — SIGNIFICANT CHANGE UP (ref 1.8–7.4)
NEUTROPHILS NFR BLD AUTO: 57.5 % — SIGNIFICANT CHANGE UP (ref 43–77)
NRBC # BLD: 0 /100 WBCS — SIGNIFICANT CHANGE UP (ref 0–0)
PLATELET # BLD AUTO: 266 K/UL — SIGNIFICANT CHANGE UP (ref 150–400)
POTASSIUM SERPL-MCNC: 3.3 MMOL/L — LOW (ref 3.5–5.3)
POTASSIUM SERPL-SCNC: 3.3 MMOL/L — LOW (ref 3.5–5.3)
PROT SERPL-MCNC: 6.9 G/DL — SIGNIFICANT CHANGE UP (ref 6–8.3)
RBC # BLD: 4.31 M/UL — SIGNIFICANT CHANGE UP (ref 3.8–5.2)
RBC # FLD: 11.9 % — SIGNIFICANT CHANGE UP (ref 10.3–14.5)
SODIUM SERPL-SCNC: 141 MMOL/L — SIGNIFICANT CHANGE UP (ref 135–145)
WBC # BLD: 10.35 K/UL — SIGNIFICANT CHANGE UP (ref 3.8–10.5)
WBC # FLD AUTO: 10.35 K/UL — SIGNIFICANT CHANGE UP (ref 3.8–10.5)

## 2024-03-05 PROCEDURE — 99233 SBSQ HOSP IP/OBS HIGH 50: CPT

## 2024-03-05 PROCEDURE — 99223 1ST HOSP IP/OBS HIGH 75: CPT

## 2024-03-05 RX ORDER — INSULIN LISPRO 100/ML
VIAL (ML) SUBCUTANEOUS
Refills: 0 | Status: DISCONTINUED | OUTPATIENT
Start: 2024-03-05 | End: 2024-03-06

## 2024-03-05 RX ORDER — NYSTATIN 500MM UNIT
500000 POWDER (EA) MISCELLANEOUS
Refills: 0 | Status: DISCONTINUED | OUTPATIENT
Start: 2024-03-05 | End: 2024-03-15

## 2024-03-05 RX ORDER — MAGNESIUM OXIDE 400 MG ORAL TABLET 241.3 MG
400 TABLET ORAL
Refills: 0 | Status: COMPLETED | OUTPATIENT
Start: 2024-03-05 | End: 2024-03-07

## 2024-03-05 RX ORDER — PETROLATUM,WHITE
1 JELLY (GRAM) TOPICAL THREE TIMES A DAY
Refills: 0 | Status: DISCONTINUED | OUTPATIENT
Start: 2024-03-05 | End: 2024-03-25

## 2024-03-05 RX ORDER — POTASSIUM CHLORIDE 20 MEQ
40 PACKET (EA) ORAL ONCE
Refills: 0 | Status: COMPLETED | OUTPATIENT
Start: 2024-03-05 | End: 2024-03-05

## 2024-03-05 RX ORDER — POTASSIUM CHLORIDE 20 MEQ
40 PACKET (EA) ORAL ONCE
Refills: 0 | Status: DISCONTINUED | OUTPATIENT
Start: 2024-03-05 | End: 2024-03-05

## 2024-03-05 RX ADMIN — MAGNESIUM OXIDE 400 MG ORAL TABLET 400 MILLIGRAM(S): 241.3 TABLET ORAL at 18:07

## 2024-03-05 RX ADMIN — AMLODIPINE BESYLATE 5 MILLIGRAM(S): 2.5 TABLET ORAL at 05:53

## 2024-03-05 RX ADMIN — SENNA PLUS 2 TABLET(S): 8.6 TABLET ORAL at 21:29

## 2024-03-05 RX ADMIN — Medication 81 MILLIGRAM(S): at 12:01

## 2024-03-05 RX ADMIN — GABAPENTIN 200 MILLIGRAM(S): 400 CAPSULE ORAL at 21:29

## 2024-03-05 RX ADMIN — CLOPIDOGREL BISULFATE 75 MILLIGRAM(S): 75 TABLET, FILM COATED ORAL at 12:02

## 2024-03-05 RX ADMIN — Medication 40 MILLIEQUIVALENT(S): at 12:03

## 2024-03-05 RX ADMIN — Medication 500000 UNIT(S): at 18:07

## 2024-03-05 RX ADMIN — Medication 1 APPLICATION(S): at 12:10

## 2024-03-05 RX ADMIN — ATORVASTATIN CALCIUM 80 MILLIGRAM(S): 80 TABLET, FILM COATED ORAL at 21:29

## 2024-03-05 RX ADMIN — Medication 500000 UNIT(S): at 12:02

## 2024-03-05 RX ADMIN — Medication 1 APPLICATION(S): at 21:30

## 2024-03-05 RX ADMIN — ENOXAPARIN SODIUM 30 MILLIGRAM(S): 100 INJECTION SUBCUTANEOUS at 21:36

## 2024-03-05 RX ADMIN — LISINOPRIL 20 MILLIGRAM(S): 2.5 TABLET ORAL at 05:53

## 2024-03-05 RX ADMIN — MAGNESIUM OXIDE 400 MG ORAL TABLET 400 MILLIGRAM(S): 241.3 TABLET ORAL at 16:10

## 2024-03-05 NOTE — DIETITIAN INITIAL EVALUATION ADULT - LITERATURE/VIDEOS GIVEN
Pt educated on adequate calorie, carbohydrate, and protein intake to prevent weight loss, promote weight gain/muscle maintenance

## 2024-03-05 NOTE — DIETITIAN INITIAL EVALUATION ADULT - PERTINENT LABORATORY DATA
03-05    141  |  103  |  13  ----------------------------<  103<H>  3.3<L>   |  24  |  0.68    Ca    9.5      05 Mar 2024 05:50  Mg     1.5     03-05    TPro  6.9  /  Alb  3.6  /  TBili  1.2  /  DBili  x   /  AST  18  /  ALT  20  /  AlkPhos  43  03-05  POCT Blood Glucose.: 117 mg/dL (03-05-24 @ 08:01)  A1C with Estimated Average Glucose Result: 5.5 % (03-02-24 @ 06:25)

## 2024-03-05 NOTE — DIETITIAN INITIAL EVALUATION ADULT - ORAL INTAKE PTA/DIET HISTORY
Information obtained from pt and pt's partner at bedside. Pt with suboptimal PO intake PTA, ate 1 meal/day sometimes 2 meals. Pt would skip meals if her blood sugar was higher than desired. HbA1c is 5.5 (WNL).

## 2024-03-05 NOTE — DIETITIAN INITIAL EVALUATION ADULT - PERSON TAUGHT/METHOD
verbal instruction/teach back - (Patient repeats in own words)/patient instructed/significant other instructed

## 2024-03-05 NOTE — CONSULT NOTE ADULT - SUBJECTIVE AND OBJECTIVE BOX
64 YO female with PMH HTN, HLD, DM2, CVA (10/2023), who presented to  [facility] ED on 3/1/24  following a fall at home. According to the partner, Connie, the patient was in her usual state of health up until 5 days prior to admission when she was noted to have right sided facial, arm and leg weakness, aphasia and episodes of confusion. She took ASA 81mg with improvement of symptoms; pt was able to ambulate and function at home but felt weak. On the day prior to admission, the patient fell prompting her partner to bring her to the ED.    On arrival, pt noted to have dense right sided facial droop and right sided hemiparesis and aphasia.  MRI brain demonstrated multiple small acute infarctions in the left MCA distribution and chronic ischemic changes from prior strokes. CTA head/neck: negative for LVO occlusion. Patient was evaluated by neurology, and recommended for ASA & plavix x 3 months.    Patient was evaluated by PM&R and therapy for functional deficits, gait/ADL impairments and acute rehabilitation was recommended. Patient was discharged to  [facility] on 3/4/24. (04 Mar 2024 13:15)      PAST MEDICAL & SURGICAL HISTORY:  HTN (hypertension)  DM (diabetes mellitus)  CVA (cerebrovascular accident)  No significant past surgical history    FAMILY HISTORY  Father: - at age - with history of   Mother: - at age - with history of     SOCIAL HISTORY  Substance Use (street drugs): (  ) never used  (  ) other:  Tobacco Usage:  (   ) never smoked   (   ) former smoker   (   ) current smoker  (     ) pack year  Alcohol Usage:  Sexual History:   Recent Travel:    Allergies  Allergy Status Unknown  Intolerances    gabapentin 200 milliGRAM(s) Oral at bedtime      REVIEW OF SYSTEMS:  CONSTITUTIONAL: No fever, weight loss, or fatigue  EYES: No eye pain, visual disturbances, or discharge  ENMT:  No difficulty hearing, tinnitus, vertigo; No sinus or throat pain  NECK: No pain or stiffness  BREASTS: No pain, masses, or nipple discharge  RESPIRATORY: No cough, wheezing, chills or hemoptysis; No shortness of breath  CARDIOVASCULAR: No chest pain, palpitations, dizziness, or leg swelling  GASTROINTESTINAL: No abdominal or epigastric pain. No nausea, vomiting, or hematemesis; No diarrhea or constipation. No melena or hematochezia.  GENITOURINARY: No dysuria, frequency, hematuria, or incontinence  NEUROLOGICAL: No headaches, memory loss, loss of strength, numbness, or tremors  SKIN: No itching, burning, rashes, or lesions   LYMPH NODES: No enlarged glands  ENDOCRINE: No heat or cold intolerance; No hair loss  MUSCULOSKELETAL: No joint pain or swelling; No muscle, back, or extremity pain  PSYCHIATRIC: No depression, anxiety, mood swings, or difficulty sleeping  HEME/LYMPH: No easy bruising, or bleeding gums  ALLERY AND IMMUNOLOGIC: No hives or eczema    ALL ROS REVIEWED AND NORMAL EXCEPT AS STATED ABOVE    T(C): 36.6 (03-04-24 @ 22:06), Max: 36.9 (03-04-24 @ 16:18)  HR: 84 (03-05-24 @ 05:50) (72 - 84)  BP: 152/80 (03-05-24 @ 05:50) (152/80 - 171/83)  RR: 16 (03-04-24 @ 22:06) (16 - 18)  SpO2: 99% (03-04-24 @ 22:06) (97% - 99%)  Wt(kg): --Vital Signs Last 24 Hrs  T(C): 36.6 (04 Mar 2024 22:06), Max: 36.9 (04 Mar 2024 16:18)  T(F): 97.8 (04 Mar 2024 22:06), Max: 98.4 (04 Mar 2024 16:18)  HR: 84 (05 Mar 2024 05:50) (72 - 84)  BP: 152/80 (05 Mar 2024 05:50) (152/80 - 171/83)  BP(mean): --  RR: 16 (04 Mar 2024 22:06) (16 - 18)  SpO2: 99% (04 Mar 2024 22:06) (97% - 99%)    Parameters below as of 04 Mar 2024 22:06  Patient On (Oxygen Delivery Method): room air        PHYSICAL EXAM:  GENERAL: NAD, well-groomed, well-developed  HEAD:  Atraumatic, Normocephalic  EYES: EOMI, PERRLA, conjunctiva and sclera clear  ENMT: No tonsillar erythema, exudates, or enlargement; Moist mucous membranes, Good dentition, No lesions  NECK: Supple, No JVD, Normal thyroid  NERVOUS SYSTEM:  Alert & Oriented X3, Good concentration; Motor Strength 5/5 B/L upper and lower extremities; DTRs 2+ intact and symmetric  CHEST/LUNG: Clear to percussion bilaterally; No rales, rhonchi, wheezing, or rubs  HEART: Regular rate and rhythm; No murmurs, rubs, or gallops  ABDOMEN: Soft, Nontender, Nondistended; Bowel sounds present  EXTREMITIES:  2+ Peripheral Pulses, No clubbing, cyanosis, or edema  LYMPH: No lymphadenopathy noted  SKIN: No rashes or lesions    LABS:                        12.3   10.75 )-----------( 265      ( 04 Mar 2024 07:49 )             37.3     03-04    142  |  103  |  9   ----------------------------<  101<H>  3.1<L>   |  23  |  0.61    Ca    9.4      04 Mar 2024 07:49    TPro  7.1  /  Alb  3.9  /  TBili  1.2  /  DBili  x   /  AST  21  /  ALT  21  /  AlkPhos  45  03-04      Urinalysis Basic - ( 04 Mar 2024 07:49 )    Color: x / Appearance: x / SG: x / pH: x  Gluc: 101 mg/dL / Ketone: x  / Bili: x / Urobili: x   Blood: x / Protein: x / Nitrite: x   Leuk Esterase: x / RBC: x / WBC x   Sq Epi: x / Non Sq Epi: x / Bacteria: x    CAPILLARY BLOOD GLUCOSE    POCT Blood Glucose.: 120 mg/dL (04 Mar 2024 21:55)  POCT Blood Glucose.: 119 mg/dL (04 Mar 2024 16:52)  POCT Blood Glucose.: 108 mg/dL (04 Mar 2024 12:01)  POCT Blood Glucose.: 118 mg/dL (04 Mar 2024 07:54)    Urinalysis Basic - ( 04 Mar 2024 07:49 )    Color: x / Appearance: x / SG: x / pH: x  Gluc: 101 mg/dL / Ketone: x  / Bili: x / Urobili: x   Blood: x / Protein: x / Nitrite: x   Leuk Esterase: x / RBC: x / WBC x   Sq Epi: x / Non Sq Epi: x / Bacteria: x    RADIOLOGY & ADDITIONAL TESTS:  MR Head No Cont (03.01.24 @ 12:35) >  IMPRESSION:    1.  Multiple small acute infarctions in the left MCA vascular   distribution.  2.  Chronic ischemic changes as discussed above.    Consultant(s) Notes Reviewed:  [x ] YES  [ ] NO  Care Discussed with Consultants/Other Providers [ x] YES  [ ] NO  Imaging Personally Reviewed:  [ ] YES  [ x] NO 62 YO female with PMH HTN, HLD, DM2, CVA (10/2023), who presented to  [facility] ED on 3/1/24  following a fall at home. According to the partner, Connie, the patient was in her usual state of health up until 5 days prior to admission when she was noted to have right sided facial, arm and leg weakness, aphasia and episodes of confusion. She took ASA 81mg with improvement of symptoms; pt was able to ambulate and function at home but felt weak. On the day prior to admission, the patient fell prompting her partner to bring her to the ED.    On arrival, pt noted to have dense right sided facial droop and right sided hemiparesis and aphasia.  MRI brain demonstrated multiple small acute infarctions in the left MCA distribution and chronic ischemic changes from prior strokes. CTA head/neck: negative for LVO occlusion. Patient was evaluated by neurology, and recommended for ASA & plavix x 3 months.    Patient was evaluated by PM&R and therapy for functional deficits, gait/ADL impairments and acute rehabilitation was recommended. Patient was discharged to  [facility] on 3/4/24.     Patient reports not liking food texture; not eating much  Last BM yesterday, urinating well  Denies chest pain, SOB        PAST MEDICAL & SURGICAL HISTORY:  HTN (hypertension)  DM (diabetes mellitus)  CVA (cerebrovascular accident)  No significant past surgical history    FAMILY HISTORY  Her family history is unknown; she does not have a relationship with them     SOCIAL HISTORY  Substance Use (street drugs): (  ) never used  (  ) other:Daily marijuana smoker  Tobacco Usage:  (  x ) never smoked   (   ) former smoker   (   ) current smoker  (     ) pack year  Alcohol Usage:Denies  Sexual History: Denies  Recent Travel:Denies    Allergies  Allergy Status Unknown  Intolerances    gabapentin 200 milliGRAM(s) Oral at bedtime    REVIEW OF SYSTEMS:  CONSTITUTIONAL: No fever, weight loss, or fatigue  EYES: No eye pain, visual disturbances, or discharge  ENMT:  No difficulty hearing, tinnitus, vertigo; No sinus or throat pain  NECK: No pain or stiffness  BREASTS: No pain, masses, or nipple discharge  RESPIRATORY: No cough, wheezing, chills or hemoptysis; No shortness of breath  CARDIOVASCULAR: No chest pain, palpitations, dizziness, or leg swelling  GASTROINTESTINAL: No abdominal or epigastric pain. No nausea, vomiting, or hematemesis; No diarrhea or constipation. No melena or hematochezia.  GENITOURINARY: No dysuria, frequency, hematuria, or incontinence  NEUROLOGICAL: No headaches, memory loss, loss of strength, numbness, or tremors  SKIN: No itching, burning, rashes, or lesions   LYMPH NODES: No enlarged glands  ENDOCRINE: No heat or cold intolerance; No hair loss  MUSCULOSKELETAL: No joint pain or swelling; No muscle, back, or extremity pain  PSYCHIATRIC: No depression, anxiety, mood swings, or difficulty sleeping  HEME/LYMPH: No easy bruising, or bleeding gums  ALLERY AND IMMUNOLOGIC: No hives or eczema    ALL ROS REVIEWED AND NORMAL EXCEPT AS STATED ABOVE    T(C): 36.6 (03-04-24 @ 22:06), Max: 36.9 (03-04-24 @ 16:18)  HR: 84 (03-05-24 @ 05:50) (72 - 84)  BP: 152/80 (03-05-24 @ 05:50) (152/80 - 171/83)  RR: 16 (03-04-24 @ 22:06) (16 - 18)  SpO2: 99% (03-04-24 @ 22:06) (97% - 99%)  Wt(kg): --Vital Signs Last 24 Hrs  T(C): 36.6 (04 Mar 2024 22:06), Max: 36.9 (04 Mar 2024 16:18)  T(F): 97.8 (04 Mar 2024 22:06), Max: 98.4 (04 Mar 2024 16:18)  HR: 84 (05 Mar 2024 05:50) (72 - 84)  BP: 152/80 (05 Mar 2024 05:50) (152/80 - 171/83)  BP(mean): --  RR: 16 (04 Mar 2024 22:06) (16 - 18)  SpO2: 99% (04 Mar 2024 22:06) (97% - 99%)    Parameters below as of 04 Mar 2024 22:06  Patient On (Oxygen Delivery Method): room air        PHYSICAL EXAM:  GENERAL: NAD, noted left facial droop and noted speech impedement  Difficult to understand; understands what she wants to say but cannot articulate well  well-groomed, well-developed  HEAD:  Atraumatic, Normocephalic  EYES: EOMI, PERRLA, conjunctiva and sclera clear  ENMT: No tonsillar erythema, exudates, or enlargement; Moist mucous membranes, Good dentition, No lesions  NECK: Supple, No JVD, Normal thyroid  NERVOUS SYSTEM:  Alert & Oriented; noted RUE weakness  CHEST/LUNG: Clear to percussion bilaterally; No rales, rhonchi, wheezing, or rubs  HEART: Regular rate and rhythm; No murmurs, rubs, or gallops  ABDOMEN: Soft, Nontender, Nondistended; Bowel sounds present  EXTREMITIES:  2+ Peripheral Pulses, No clubbing, cyanosis, or edema  LYMPH: No lymphadenopathy noted  SKIN: No rashes or lesions    LABS:                        12.3   10.75 )-----------( 265      ( 04 Mar 2024 07:49 )             37.3     03-04    142  |  103  |  9   ----------------------------<  101<H>  3.1<L>   |  23  |  0.61    Ca    9.4      04 Mar 2024 07:49    TPro  7.1  /  Alb  3.9  /  TBili  1.2  /  DBili  x   /  AST  21  /  ALT  21  /  AlkPhos  45  03-04      Urinalysis Basic - ( 04 Mar 2024 07:49 )    Color: x / Appearance: x / SG: x / pH: x  Gluc: 101 mg/dL / Ketone: x  / Bili: x / Urobili: x   Blood: x / Protein: x / Nitrite: x   Leuk Esterase: x / RBC: x / WBC x   Sq Epi: x / Non Sq Epi: x / Bacteria: x    CAPILLARY BLOOD GLUCOSE    POCT Blood Glucose.: 120 mg/dL (04 Mar 2024 21:55)  POCT Blood Glucose.: 119 mg/dL (04 Mar 2024 16:52)  POCT Blood Glucose.: 108 mg/dL (04 Mar 2024 12:01)  POCT Blood Glucose.: 118 mg/dL (04 Mar 2024 07:54)    Urinalysis Basic - ( 04 Mar 2024 07:49 )    Color: x / Appearance: x / SG: x / pH: x  Gluc: 101 mg/dL / Ketone: x  / Bili: x / Urobili: x   Blood: x / Protein: x / Nitrite: x   Leuk Esterase: x / RBC: x / WBC x   Sq Epi: x / Non Sq Epi: x / Bacteria: x    RADIOLOGY & ADDITIONAL TESTS:  MR Head No Cont (03.01.24 @ 12:35) >  IMPRESSION:    1.  Multiple small acute infarctions in the left MCA vascular   distribution.  2.  Chronic ischemic changes as discussed above.    Consultant(s) Notes Reviewed:  [x ] YES  [ ] NO  Care Discussed with Consultants/Other Providers [ x] YES  [ ] NO  Imaging Personally Reviewed:  [ ] YES  [ x] NO

## 2024-03-05 NOTE — DIETITIAN INITIAL EVALUATION ADULT - SIGNS/SYMPTOMS
as evidenced by eating <75% of EEN >1 month PTA, moderate loss of muscle and fat as evidenced by dysphagia requiring minced and moist consistency food

## 2024-03-05 NOTE — CONSULT NOTE ADULT - ASSESSMENT
64 YO female with PMH HTN, HLD, DM2, CVA (10/2023), who presented to [facility] ED on 3/1/24 with right sided facial, right arm and leg weakness, aphasia and episodes of confusion as well as a fall. MRI brain demonstrated multiple small acute infarctions in the left MCA distribution  Patient now admitted with gait Instability, ADL impairments and Functional impairments.    #Left MCA CVA with expressive aphasia, right hemiparesis, and right facial droop  - ASA & plavix for 3 months, discontinue aspirin after 3 months  - Lipitor 80mg qhs    # HTN  - Amlodipine 5mg daily  - Lisinopril 20mg daily    # HLD  - Lipitor 80mg daily    # Diabetes Mellitus Type 2 without complications  - A1c 5.5 on 3/2/24  - ISS and FS    DVT ppx- Lovenox       62 YO female with PMH HTN, HLD, DM2, CVA (10/2023), who presented to [facility] ED on 3/1/24 with right sided facial, right arm and leg weakness, aphasia and episodes of confusion as well as a fall. MRI brain demonstrated multiple small acute infarctions in the left MCA distribution  Patient now admitted with gait Instability, ADL impairments and Functional impairments.    #Left MCA CVA with expressive aphasia, right hemiparesis, and right facial droop  - ASA & plavix for 3 months, discontinue aspirin after 3 months  - Lipitor 80mg qhs    # HTN  - Amlodipine 5mg daily  - Lisinopril 20mg daily    # HLD  - Lipitor 80mg daily    # Diabetes Mellitus Type 2 without complications  - A1c 5.5 on 3/2/24  - Will only check FS before breakfast, c/w ISS  - IF well controlled over next 24-48hrs, will d/c FS    DVT ppx- Lovenox

## 2024-03-05 NOTE — DIETITIAN INITIAL EVALUATION ADULT - OTHER INFO
64 YO female with PMH HTN, HLD, DM2, CVA (10/2023), who presented to [facility] ED on 3/1/24 with right sided facial, right arm and leg weakness, aphasia and episodes of confusion as well as a fall. MRI brain demonstrated multiple small acute infarctions in the left MCA distribution  Patient now admitted with gait Instability, ADL impairments and Functional impairments.    Pt currently on a minced and moist diet, tolerating diet (ate 75% of salmon @ lunch today per pt's partner). Pt disliked pureed food @ previous hospital. Pt has a hx of DM but HbA1c WNL. Recommend continuing with current diet given pt's suboptimal PO intake PTA and good glycemic control. Pt's partner/pt report UBW of 106-108 lbs PTA. Current weight is 87.3 lbs. Recommend trial of Glucerna supplement (provides 220 kcal, 10 g protein/serving). Encouraged adequate PO intake, importance of calories and protein during rehab and in general. Denies nausea, vomiting, diarrhea, constipation.

## 2024-03-05 NOTE — DIETITIAN INITIAL EVALUATION ADULT - PERTINENT MEDS FT
MEDICATIONS  (STANDING):  amLODIPine   Tablet 5 milliGRAM(s) Oral daily  AQUAPHOR (petrolatum Ointment) 1 Application(s) Topical three times a day  aspirin enteric coated 81 milliGRAM(s) Oral daily  atorvastatin 80 milliGRAM(s) Oral at bedtime  clopidogrel Tablet 75 milliGRAM(s) Oral daily  dextrose 5%. 1000 milliLiter(s) (50 mL/Hr) IV Continuous <Continuous>  dextrose 50% Injectable 12.5 Gram(s) IV Push once  dextrose 50% Injectable 25 Gram(s) IV Push once  enoxaparin Injectable 30 milliGRAM(s) SubCutaneous every 24 hours  gabapentin 200 milliGRAM(s) Oral at bedtime  glucagon  Injectable 1 milliGRAM(s) IntraMuscular once  insulin lispro (ADMELOG) corrective regimen sliding scale   SubCutaneous before breakfast  lisinopril 20 milliGRAM(s) Oral daily  magnesium oxide 400 milliGRAM(s) Oral three times a day with meals  nystatin    Suspension 621162 Unit(s) Oral four times a day  senna 2 Tablet(s) Oral at bedtime    MEDICATIONS  (PRN):  acetaminophen     Tablet .. 650 milliGRAM(s) Oral every 6 hours PRN Temp greater or equal to 38C (100.4F), Mild Pain (1 - 3)  aluminum hydroxide/magnesium hydroxide/simethicone Suspension 30 milliLiter(s) Oral every 4 hours PRN Dyspepsia  melatonin 3 milliGRAM(s) Oral at bedtime PRN Insomnia  ondansetron   Disintegrating Tablet 4 milliGRAM(s) Oral every 8 hours PRN Nausea and/or Vomiting  polyethylene glycol 3350 17 Gram(s) Oral daily PRN Constipation

## 2024-03-06 LAB
ANION GAP SERPL CALC-SCNC: 6 MMOL/L — SIGNIFICANT CHANGE UP (ref 5–17)
BUN SERPL-MCNC: 15 MG/DL — SIGNIFICANT CHANGE UP (ref 7–23)
CALCIUM SERPL-MCNC: 9.2 MG/DL — SIGNIFICANT CHANGE UP (ref 8.4–10.5)
CHLORIDE SERPL-SCNC: 107 MMOL/L — SIGNIFICANT CHANGE UP (ref 96–108)
CO2 SERPL-SCNC: 31 MMOL/L — SIGNIFICANT CHANGE UP (ref 22–31)
CREAT SERPL-MCNC: 0.76 MG/DL — SIGNIFICANT CHANGE UP (ref 0.5–1.3)
EGFR: 88 ML/MIN/1.73M2 — SIGNIFICANT CHANGE UP
GLUCOSE BLDC GLUCOMTR-MCNC: 159 MG/DL — HIGH (ref 70–99)
GLUCOSE SERPL-MCNC: 97 MG/DL — SIGNIFICANT CHANGE UP (ref 70–99)
POTASSIUM SERPL-MCNC: 3.7 MMOL/L — SIGNIFICANT CHANGE UP (ref 3.5–5.3)
POTASSIUM SERPL-SCNC: 3.7 MMOL/L — SIGNIFICANT CHANGE UP (ref 3.5–5.3)
SODIUM SERPL-SCNC: 144 MMOL/L — SIGNIFICANT CHANGE UP (ref 135–145)

## 2024-03-06 PROCEDURE — 99233 SBSQ HOSP IP/OBS HIGH 50: CPT

## 2024-03-06 PROCEDURE — 99232 SBSQ HOSP IP/OBS MODERATE 35: CPT

## 2024-03-06 RX ORDER — AMLODIPINE BESYLATE 2.5 MG/1
10 TABLET ORAL DAILY
Refills: 0 | Status: DISCONTINUED | OUTPATIENT
Start: 2024-03-07 | End: 2024-03-25

## 2024-03-06 RX ORDER — AMLODIPINE BESYLATE 2.5 MG/1
5 TABLET ORAL ONCE
Refills: 0 | Status: COMPLETED | OUTPATIENT
Start: 2024-03-06 | End: 2024-03-06

## 2024-03-06 RX ADMIN — SENNA PLUS 2 TABLET(S): 8.6 TABLET ORAL at 21:11

## 2024-03-06 RX ADMIN — MAGNESIUM OXIDE 400 MG ORAL TABLET 400 MILLIGRAM(S): 241.3 TABLET ORAL at 17:38

## 2024-03-06 RX ADMIN — AMLODIPINE BESYLATE 5 MILLIGRAM(S): 2.5 TABLET ORAL at 08:14

## 2024-03-06 RX ADMIN — Medication 81 MILLIGRAM(S): at 11:52

## 2024-03-06 RX ADMIN — LISINOPRIL 20 MILLIGRAM(S): 2.5 TABLET ORAL at 05:53

## 2024-03-06 RX ADMIN — Medication 500000 UNIT(S): at 05:53

## 2024-03-06 RX ADMIN — Medication 1 APPLICATION(S): at 21:12

## 2024-03-06 RX ADMIN — Medication 500000 UNIT(S): at 21:12

## 2024-03-06 RX ADMIN — Medication 1 APPLICATION(S): at 05:53

## 2024-03-06 RX ADMIN — CLOPIDOGREL BISULFATE 75 MILLIGRAM(S): 75 TABLET, FILM COATED ORAL at 11:53

## 2024-03-06 RX ADMIN — Medication 500000 UNIT(S): at 11:53

## 2024-03-06 RX ADMIN — MAGNESIUM OXIDE 400 MG ORAL TABLET 400 MILLIGRAM(S): 241.3 TABLET ORAL at 11:53

## 2024-03-06 RX ADMIN — Medication 1 APPLICATION(S): at 17:38

## 2024-03-06 RX ADMIN — ATORVASTATIN CALCIUM 80 MILLIGRAM(S): 80 TABLET, FILM COATED ORAL at 21:10

## 2024-03-06 RX ADMIN — Medication 500000 UNIT(S): at 17:38

## 2024-03-06 RX ADMIN — MAGNESIUM OXIDE 400 MG ORAL TABLET 400 MILLIGRAM(S): 241.3 TABLET ORAL at 08:20

## 2024-03-06 RX ADMIN — Medication 2: at 07:28

## 2024-03-06 RX ADMIN — ENOXAPARIN SODIUM 30 MILLIGRAM(S): 100 INJECTION SUBCUTANEOUS at 21:11

## 2024-03-06 RX ADMIN — AMLODIPINE BESYLATE 5 MILLIGRAM(S): 2.5 TABLET ORAL at 05:53

## 2024-03-06 RX ADMIN — GABAPENTIN 200 MILLIGRAM(S): 400 CAPSULE ORAL at 21:10

## 2024-03-06 NOTE — PROGRESS NOTE ADULT - COMMENTS
Patient  seen in OT. Sitting EOB, unsupported sitting balance good-. Her yes no seem sl more reliable todayl says "yes" and uses head shake for "no". Denies pain or H/A. Denies extremity pain, although noted to have increased tone RUE similar to yesterday. Reviewed with OT, and will order right resting hand splint and right elbow comfy splint. ROM and tone did improve after ROM with therapy    BP noted to be elevated. Adjusting HTN meds, informed patient

## 2024-03-06 NOTE — PROGRESS NOTE ADULT - SUBJECTIVE AND OBJECTIVE BOX
Patient is a 63y old  Female who presents with a chief complaint of L MCA CVA with expressive aphasia, right hemiparesis, and right facial droop (06 Mar 2024 08:42)      Subjective and overnight events:  Patient seen and examined at bedside. no headache, fever, chills, sob, cp, abd pain + slept well and + BM    ALLERGIES:  Allergy Status Unknown    MEDICATIONS  (STANDING):  AQUAPHOR (petrolatum Ointment) 1 Application(s) Topical three times a day  aspirin enteric coated 81 milliGRAM(s) Oral daily  atorvastatin 80 milliGRAM(s) Oral at bedtime  clopidogrel Tablet 75 milliGRAM(s) Oral daily  dextrose 5%. 1000 milliLiter(s) (50 mL/Hr) IV Continuous <Continuous>  dextrose 50% Injectable 12.5 Gram(s) IV Push once  dextrose 50% Injectable 25 Gram(s) IV Push once  enoxaparin Injectable 30 milliGRAM(s) SubCutaneous every 24 hours  gabapentin 200 milliGRAM(s) Oral at bedtime  glucagon  Injectable 1 milliGRAM(s) IntraMuscular once  insulin lispro (ADMELOG) corrective regimen sliding scale   SubCutaneous before breakfast  lisinopril 20 milliGRAM(s) Oral daily  magnesium oxide 400 milliGRAM(s) Oral three times a day with meals  nystatin    Suspension 833099 Unit(s) Oral four times a day  senna 2 Tablet(s) Oral at bedtime    MEDICATIONS  (PRN):  acetaminophen     Tablet .. 650 milliGRAM(s) Oral every 6 hours PRN Temp greater or equal to 38C (100.4F), Mild Pain (1 - 3)  aluminum hydroxide/magnesium hydroxide/simethicone Suspension 30 milliLiter(s) Oral every 4 hours PRN Dyspepsia  melatonin 3 milliGRAM(s) Oral at bedtime PRN Insomnia  ondansetron   Disintegrating Tablet 4 milliGRAM(s) Oral every 8 hours PRN Nausea and/or Vomiting  polyethylene glycol 3350 17 Gram(s) Oral daily PRN Constipation    Vital Signs Last 24 Hrs  T(F): 97.1 (06 Mar 2024 07:33), Max: 98.4 (05 Mar 2024 21:08)  HR: 75 (06 Mar 2024 07:33) (75 - 78)  BP: 149/98 (06 Mar 2024 07:33) (149/98 - 188/89)  RR: 16 (06 Mar 2024 07:33) (16 - 16)  SpO2: 98% (06 Mar 2024 07:33) (98% - 99%)  I&O's Summary    05 Mar 2024 07:01  -  06 Mar 2024 07:00  --------------------------------------------------------  IN: 240 mL / OUT: 0 mL / NET: 240 mL      PHYSICAL EXAM:  General: NAD, Awake alert. +expressive aphasia  ENT: MMM  Neck: Supple, No JVD  Lungs: Clear to auscultation bilaterally  Cardio: RRR, S1/S2, No murmurs  Abdomen: Soft, Nontender, Nondistended; Bowel sounds present  Extremities: No calf tenderness, No pitting edema    LABS:                        12.3   10.35 )-----------( 266      ( 05 Mar 2024 05:50 )             37.5     03-06    144  |  107  |  15  ----------------------------<  97  3.7   |  31  |  0.76    Ca    9.2      06 Mar 2024 05:10  Mg     1.5     03-05    TPro  6.9  /  Alb  3.6  /  TBili  1.2  /  DBili  x   /  AST  18  /  ALT  20  /  AlkPhos  43  03-05              03-02 Chol 165 mg/dL LDL -- HDL 54 mg/dL Trig 92 mg/dL              POCT Blood Glucose.: 159 mg/dL (06 Mar 2024 07:24)      Urinalysis Basic - ( 06 Mar 2024 05:10 )    Color: x / Appearance: x / SG: x / pH: x  Gluc: 97 mg/dL / Ketone: x  / Bili: x / Urobili: x   Blood: x / Protein: x / Nitrite: x   Leuk Esterase: x / RBC: x / WBC x   Sq Epi: x / Non Sq Epi: x / Bacteria: x            RADIOLOGY & ADDITIONAL TESTS:    Care Discussed with Consultants/Other Providers:

## 2024-03-06 NOTE — PROGRESS NOTE ADULT - SUBJECTIVE AND OBJECTIVE BOX
Patient is a 63y old  Female who presents with a chief complaint of Cerebral infarction     (05 Mar 2024 15:51)      HPI:  This ia a 62 YO female RH dominant with PMH HTN, HLD, DM2, CVA (10/2023), who presented to  [facility] ED on 3/1/24  following a fall at home. According to the partner, Connie, the patient was in her usual state of health up until 5 days prior to admission when she was noted to have right sided facial, arm and leg weakness, aphasia and episodes of confusion. She took ASA 81mg with improvement of symptoms; pt was able to ambulate and function at home but felt weak. On the day prior to admission, the patient fell prompting her partner to bring her to the ED.    On arrival, pt noted to have dense right sided facial droop and right sided hemiparesis and aphasia.  MRI brain demonstrated multiple small acute infarctions in the left MCA distribution and chronic ischemic changes from prior strokes. CTA head/neck: negative for LVO occlusion. Patient was evaluated by neurology, and recommended for ASA & plavix x 3 months.    Patient was evaluated by PM&R and therapy for functional deficits, gait/ADL impairments and acute rehabilitation was recommended. Patient was discharged to  [facility] on 3/4/24. (04 Mar 2024 13:15)      PAST MEDICAL & SURGICAL HISTORY:  HTN (hypertension)      DM (diabetes mellitus)      CVA (cerebrovascular accident)      No significant past surgical history          MEDICATIONS  (STANDING):  AQUAPHOR (petrolatum Ointment) 1 Application(s) Topical three times a day  aspirin enteric coated 81 milliGRAM(s) Oral daily  atorvastatin 80 milliGRAM(s) Oral at bedtime  clopidogrel Tablet 75 milliGRAM(s) Oral daily  dextrose 5%. 1000 milliLiter(s) (50 mL/Hr) IV Continuous <Continuous>  dextrose 50% Injectable 12.5 Gram(s) IV Push once  dextrose 50% Injectable 25 Gram(s) IV Push once  enoxaparin Injectable 30 milliGRAM(s) SubCutaneous every 24 hours  gabapentin 200 milliGRAM(s) Oral at bedtime  glucagon  Injectable 1 milliGRAM(s) IntraMuscular once  insulin lispro (ADMELOG) corrective regimen sliding scale   SubCutaneous before breakfast  lisinopril 20 milliGRAM(s) Oral daily  magnesium oxide 400 milliGRAM(s) Oral three times a day with meals  nystatin    Suspension [id number] Unit(s) Oral four times a day  senna 2 Tablet(s) Oral at bedtime    MEDICATIONS  (PRN):  acetaminophen     Tablet .. 650 milliGRAM(s) Oral every 6 hours PRN Temp greater or equal to 38C (100.4F), Mild Pain (1 - 3)  aluminum hydroxide/magnesium hydroxide/simethicone Suspension 30 milliLiter(s) Oral every 4 hours PRN Dyspepsia  melatonin 3 milliGRAM(s) Oral at bedtime PRN Insomnia  ondansetron   Disintegrating Tablet 4 milliGRAM(s) Oral every 8 hours PRN Nausea and/or Vomiting  polyethylene glycol 3350 17 Gram(s) Oral daily PRN Constipation      Allergies    Allergy Status Unknown    Intolerances          VITALS  63y  Vital Signs Last 24 Hrs  T(C): 36.2 (06 Mar 2024 07:33), Max: 36.9 (05 Mar 2024 21:08)  T(F): 97.1 (06 Mar 2024 07:33), Max: 98.4 (05 Mar 2024 21:08)  HR: 75 (06 Mar 2024 07:33) (75 - 78)  BP: 149/98 (06 Mar 2024 07:33) (149/98 - 188/89)  BP(mean): --  RR: 16 (06 Mar 2024 07:33) (16 - 16)  SpO2: 98% (06 Mar 2024 07:33) (98% - 99%)    Parameters below as of 06 Mar 2024 07:33  Patient On (Oxygen Delivery Method): room air      Daily     Daily         RECENT LABS:                          12.3   10.35 )-----------( 266      ( 05 Mar 2024 05:50 )             37.5     03-06    144  |  107  |  15  ----------------------------<  97  3.7   |  31  |  0.76    Ca    9.2      06 Mar 2024 05:10  Mg     1.5     03-05    TPro  6.9  /  Alb  3.6  /  TBili  1.2  /  DBili  x   /  AST  18  /  ALT  20  /  AlkPhos  43  03-05    LIVER FUNCTIONS - ( 05 Mar 2024 05:50 )  Alb: 3.6 g/dL / Pro: 6.9 g/dL / ALK PHOS: 43 U/L / ALT: 20 U/L / AST: 18 U/L / GGT: x             Urinalysis Basic - ( 06 Mar 2024 05:10 )    Color: x / Appearance: x / SG: x / pH: x  Gluc: 97 mg/dL / Ketone: x  / Bili: x / Urobili: x   Blood: x / Protein: x / Nitrite: x   Leuk Esterase: x / RBC: x / WBC x   Sq Epi: x / Non Sq Epi: x / Bacteria: x          CAPILLARY BLOOD GLUCOSE      POCT Blood Glucose.: 159 mg/dL (06 Mar 2024 07:24)

## 2024-03-06 NOTE — PROGRESS NOTE ADULT - ASSESSMENT
64 YO female with PMH HTN, HLD, DM2, CVA (10/2023), who presented to [facility] ED on 3/1/24 with right sided facial, right arm and leg weakness, aphasia and episodes of confusion as well as a fall. MRI brain demonstrated multiple small acute infarctions in the left MCA distribution  Patient now admitted with gait Instability, ADL impairments and Functional impairments.    #Left MCA CVA with expressive aphasia, right hemiparesis, and right facial droop  - ASA & plavix for 3 months, discontinue aspirin after 3 months  - Lipitor 80mg qhs    # HTN  - BP elevated, SBP persistent >160  - Amlodipine increased to 10mg   - Lisinopril 20mg daily    # HLD  - Lipitor 80mg daily    # Diabetes Mellitus Type 2 without complications  - A1c 5.5 on 3/2/24  - ISS  - DC Accu-Cheks since FS well controlled    DVT ppx- Lovenox

## 2024-03-06 NOTE — PROGRESS NOTE ADULT - ASSESSMENT
64 YO female with PMH HTN, HLD, DM2, CVA (10/2023), who presented to [facility] ED on 3/1/24 with right sided facial, right arm and leg weakness, aphasia and episodes of confusion as well as a fall. MRI brain demonstrated multiple small acute infarctions in the left MCA distribution  Patient now admitted with gait Instability, ADL impairments and Functional impairments.    #Left MCA CVA with expressive aphasia, right hemiparesis, and right facial droop  - MRI 3/1: Multiple foci of diffusion restriction scattered throughout the left MCA vascular distribution, compatible with acute infarctions. Chronic lacunar infarctions in the right centrum semiovale/corona radiata and left hemipons. Encephalomalacia/gliotic change in the anterior right temporal lobe. Foci of susceptibility artifact in the right basal ganglia, likely sequela of prior microhemorrhages.  - ASA & plavix for 3 months, discontinue aspirin after 3 months  - High dose statin  - Continue Comprehensive Rehab Program: PT/OT/ST, 3hours daily and 5 days weekly  - Precautions: fall, aspiration, cardiac, DM, AMS    # HTN  - Amlodipine 5mg daily  - Lisinopril 20mg daily  - /98 - 188/89) 3/6. Not controlled. Hospitalist f/u    # HLD  - Lipitor 80mg daily    # Diabetes Mellitus Type 2 without complications  - A1c 5.5 on 3/2/24  - ISS and FS  - Hospitalist appreciated. check FS before breakfast, c/w ISS. IF well controlled over next 24-48hrs, will d/c FS  -  3/6    # hypokalemia  - K+ 3.3 3/5. Replete 40 meq x 1  - K+ 3/7 3/6. Monitor, patient was on K+ supplements previously per partner    # hypomagnesemia  - Mg++ 1.5  - Mg oxide supplemented x 2 days  - Mg++ level 3/7    # Pain management  - Tylenol PRN  - gabapentin 200 mg QHS 3/4 initiated for neuropathic pain    # Bowel Regimen  - Senna, miralax PRN    # Sleep:   - Melatonin 3mg nightly PRN    # FEN   - Diet: upgraded to minced/moist and thin liquids 3/5  - SLP and nutrition consults    # DVT ppx  - Lovenox    # Case discussed in IDT rounds 3/6 (initial):  -       # LABS  monitor BP  CBC CMP Mg++ 3/7    #GOC  CODE STATUS: FULL CODE    Outpatient Follow-up (Specialty/Name of physician):    Jacobo Miramontes  Physical/Rehab Medicine  [address] [place] [id number]  Phone: [phone]  Fax: [phone]  Follow Up Time:       64 YO female with PMH HTN, HLD, DM2, CVA (10/2023), who presented to [facility] ED on 3/1/24 with right sided facial, right arm and leg weakness, aphasia and episodes of confusion as well as a fall. MRI brain demonstrated multiple small acute infarctions in the left MCA distribution  Patient now admitted with gait Instability, ADL impairments and Functional impairments.    #Left MCA CVA with expressive aphasia, right hemiparesis, and right facial droop  - MRI 3/1: Multiple foci of diffusion restriction scattered throughout the left MCA vascular distribution, compatible with acute infarctions. Chronic lacunar infarctions in the right centrum semiovale/corona radiata and left hemipons. Encephalomalacia/gliotic change in the anterior right temporal lobe. Foci of susceptibility artifact in the right basal ganglia, likely sequela of prior micro-hemorrhages.  - ASA & plavix for 3 months, Discontinue aspirin after 3 months  - High dose statin  - Continue Comprehensive Rehab Program: PT/OT/ST, 3hours daily and 5 days weekly  - Precautions: fall, aspiration, cardiac, DM, AMS    # HTN  - Amlodipine 5mg daily--> increased to 10 mg daily starting 3/7. Extra 5 mg dose given today, discussed with patient  - Lisinopril 20mg daily  - /98 - 188/89) 3/6. Not controlled. Hospitalist f/u appreciated    # HLD  - Lipitor 80mg daily    # Diabetes Mellitus Type 2 without complications  - A1c 5.5 on 3/2/24  - ISS and FS  - Hospitalist appreciated. check FS before breakfast, c/w ISS. IF well controlled over next 24-48hrs, will d/c FS  -  3/6    # hypokalemia  - K+ 3.3 3/5. Replete 40 meq x 1  - K+ 3/7 3/6. Monitor, patient was on K+ supplements previously per partner. F/u Mg level as below    # hypomagnesemia  - Mg++ 1.5  - Mg oxide supplemented x 2 days  - Mg++ level 3/7    # Pain management  - Tylenol PRN  - gabapentin 200 mg QHS 3/4 initiated for neuropathic pain    # Bowel Regimen  - Senna  - miralax PRN    # Sleep:   - Melatonin 3mg nightly PRN    # FEN   - Diet: upgraded to minced/moist and thin liquids 3/5  - SLP and nutrition consults    # DVT ppx  - Lovenox    # Case discussed in IDT rounds 3/6 (initial):  - mod-severe mixed expressive and receptive aphasia, +right shelby inattention, +aphasic, max-total assist toilet transfers and max assist UB dressing and bathing, total LB dressing and toileting. Min assist bed mobility, mod assist transfers, ambulates 10 feet with parallel bars with mod-max assist +occ impulsive  - goals: supervision waking hours due to cog-language deficits, CG transfers, CG/min assist ambulation and stairs, CG bADLs  - target: dc home 3/23 with caregiver support and home care referral PT OT SLP  - caregiver training      # LABS  monitor BP  CBC CMP Mg++ 3/7    #GOC  CODE STATUS: FULL CODE    Outpatient Follow-up (Specialty/Name of physician):    Jacobo Miramontes  Physical/Rehab Medicine  [address] [place] [id number]  Phone: [phone]  Fax: [phone]  Follow Up Time:       64 YO female with PMH HTN, HLD, DM2, CVA (10/2023), who presented to [facility] ED on 3/1/24 with right sided facial, right arm and leg weakness, aphasia and episodes of confusion as well as a fall. MRI brain demonstrated multiple small acute infarctions in the left MCA distribution  Patient now admitted with gait Instability, ADL impairments and Functional impairments.    #Left MCA CVA with expressive aphasia, right hemiparesis, and right facial droop  - MRI 3/1: Multiple foci of diffusion restriction scattered throughout the left MCA vascular distribution, compatible with acute infarctions. Chronic lacunar infarctions in the right centrum semiovale/corona radiata and left hemipons. Encephalomalacia/gliotic change in the anterior right temporal lobe. Foci of susceptibility artifact in the right basal ganglia, likely sequela of prior micro-hemorrhages.  - ASA & plavix for 3 months, Discontinue aspirin after 3 months  - High dose statin  - Continue Comprehensive Rehab Program: PT/OT/ST, 3hours daily and 5 days weekly  - right elbow comfy splint to improve extension, right resting hand splint  - Precautions: fall, aspiration, cardiac, DM, AMS    # HTN  - Amlodipine 5mg daily--> increased to 10 mg daily starting 3/7. Extra 5 mg dose given today, discussed with patient  - Lisinopril 20mg daily  - /98 - 188/89) 3/6. Not controlled. Hospitalist f/u appreciated    # HLD  - Lipitor 80mg daily    # Diabetes Mellitus Type 2 without complications  - A1c 5.5 on 3/2/24  - ISS and FS  - Hospitalist appreciated. check FS before breakfast, c/w ISS. IF well controlled over next 24-48hrs, will d/c FS  -  3/6    # hypokalemia  - K+ 3.3 3/5. Replete 40 meq x 1  - K+ 3/7 3/6. Monitor, patient was on K+ supplements previously per partner. F/u Mg level as below    # hypomagnesemia  - Mg++ 1.5  - Mg oxide supplemented x 2 days  - Mg++ level 3/7    # Pain management  - Tylenol PRN  - gabapentin 200 mg QHS 3/4 initiated for neuropathic pain    # Bowel Regimen  - Senna  - miralax PRN    # Sleep:   - Melatonin 3mg nightly PRN    # FEN   - Diet: upgraded to minced/moist and thin liquids 3/5  - SLP and nutrition consults    # DVT ppx  - Lovenox    # Case discussed in IDT rounds 3/6 (initial):  - mod-severe mixed expressive and receptive aphasia, +right shelby inattention, +aphasic, max-total assist toilet transfers and max assist UB dressing and bathing, total LB dressing and toileting. Min assist bed mobility, mod assist transfers, ambulates 10 feet with parallel bars with mod-max assist +occ impulsive  - goals: supervision waking hours due to cog-language deficits, CG transfers, CG/min assist ambulation and stairs, CG bADLs  - target: dc home 3/23 with caregiver support and home care referral PT OT SLP  - caregiver training      # LABS  monitor BP  CBC CMP Mg++ 3/7    #GOC  CODE STATUS: FULL CODE    Outpatient Follow-up (Specialty/Name of physician):    Jacobo Miramontes  Physical/Rehab Medicine  [address] [place] [id number]  Phone: [phone]  Fax: [phone]  Follow Up Time:       62 YO female with PMH HTN, HLD, DM2, CVA (10/2023), who presented to [facility] ED on 3/1/24 with right sided facial, right arm and leg weakness, aphasia and episodes of confusion as well as a fall. MRI brain demonstrated multiple small acute infarctions in the left MCA distribution  Patient now admitted with gait Instability, ADL impairments and Functional impairments.    #Left MCA CVA with expressive aphasia, right hemiparesis, and right facial droop  - MRI 3/1: Multiple foci of diffusion restriction scattered throughout the left MCA vascular distribution, compatible with acute infarctions. Chronic lacunar infarctions in the right centrum semiovale/corona radiata and left hemipons. Encephalomalacia/gliotic change in the anterior right temporal lobe. Foci of susceptibility artifact in the right basal ganglia, likely sequela of prior micro-hemorrhages.  - ASA & plavix for 3 months, Discontinue aspirin after 3 months  - High dose statin  - Continue Comprehensive Rehab Program: PT/OT/ST, 3hours daily and 5 days weekly  - right elbow comfy splint to improve extension, right resting hand splint  - Precautions: fall, aspiration, cardiac, DM, AMS    # dysphonia  - ENT consult eval VC    # HTN  - Amlodipine 5mg daily--> increased to 10 mg daily starting 3/7. Extra 5 mg dose given today, discussed with patient  - Lisinopril 20mg daily  - /98 - 188/89) 3/6. Not controlled. Hospitalist f/u appreciated    # HLD  - Lipitor 80mg daily    # Diabetes Mellitus Type 2 without complications  - A1c 5.5 on 3/2/24  - ISS and FS  - Hospitalist appreciated. check FS before breakfast, c/w ISS. IF well controlled over next 24-48hrs, will d/c FS  -  3/6    # hypokalemia  - K+ 3.3 3/5. Replete 40 meq x 1  - K+ 3/7 3/6. Monitor, patient was on K+ supplements previously per partner. F/u Mg level as below    # hypomagnesemia  - Mg++ 1.5  - Mg oxide supplemented x 2 days  - Mg++ level 3/7    # Pain management  - Tylenol PRN  - gabapentin 200 mg QHS 3/4 initiated for neuropathic pain    # Bowel Regimen  - Senna  - miralax PRN    # Sleep:   - Melatonin 3mg nightly PRN    # FEN   - Diet: upgraded to minced/moist and thin liquids 3/5  - SLP and nutrition consults    # DVT ppx  - Lovenox    # Case discussed in IDT rounds 3/6 (initial):  - mod-severe mixed expressive and receptive aphasia, +right shelby inattention, +aphasic, max-total assist toilet transfers and max assist UB dressing and bathing, total LB dressing and toileting. Min assist bed mobility, mod assist transfers, ambulates 10 feet with parallel bars with mod-max assist +occ impulsive  - goals: supervision waking hours due to cog-language deficits, CG transfers, CG/min assist ambulation and stairs, CG bADLs  - target: dc home 3/23 with caregiver support and home care referral PT OT SLP  - caregiver training      # LABS  monitor BP  ENT consult  CBC CMP Mg++ 3/7    #GOC  CODE STATUS: FULL CODE    Outpatient Follow-up (Specialty/Name of physician):    Jacobo Miramontes  Physical/Rehab Medicine  [address] [place] [id number]  Phone: [phone]  Fax: [phone]  Follow Up Time:

## 2024-03-06 NOTE — SOCIAL WORK PROGRESS NOTE - NSSWPROGRESSNOTE_GEN_ALL_CORE
SW note late entry:  department received voicemail from insurance yesterday with approval for [facility] at [facility]. Authorization [id number] approved until 3/11/2024 with update due on that date faxed to [phone]. I called [facility] at [facility] and spoke with admitting office provided them with approval

## 2024-03-07 DIAGNOSIS — R13.10 DYSPHAGIA, UNSPECIFIED: ICD-10-CM

## 2024-03-07 DIAGNOSIS — R49.0 DYSPHONIA: ICD-10-CM

## 2024-03-07 LAB
ALBUMIN SERPL ELPH-MCNC: 3.4 G/DL — SIGNIFICANT CHANGE UP (ref 3.3–5)
ALP SERPL-CCNC: 43 U/L — SIGNIFICANT CHANGE UP (ref 40–120)
ALT FLD-CCNC: 30 U/L — SIGNIFICANT CHANGE UP (ref 10–45)
ANION GAP SERPL CALC-SCNC: 8 MMOL/L — SIGNIFICANT CHANGE UP (ref 5–17)
AST SERPL-CCNC: 25 U/L — SIGNIFICANT CHANGE UP (ref 10–40)
BASOPHILS # BLD AUTO: 0.04 K/UL — SIGNIFICANT CHANGE UP (ref 0–0.2)
BASOPHILS NFR BLD AUTO: 0.4 % — SIGNIFICANT CHANGE UP (ref 0–2)
BILIRUB SERPL-MCNC: 0.8 MG/DL — SIGNIFICANT CHANGE UP (ref 0.2–1.2)
BUN SERPL-MCNC: 14 MG/DL — SIGNIFICANT CHANGE UP (ref 7–23)
CALCIUM SERPL-MCNC: 9.1 MG/DL — SIGNIFICANT CHANGE UP (ref 8.4–10.5)
CHLORIDE SERPL-SCNC: 105 MMOL/L — SIGNIFICANT CHANGE UP (ref 96–108)
CO2 SERPL-SCNC: 27 MMOL/L — SIGNIFICANT CHANGE UP (ref 22–31)
CREAT SERPL-MCNC: 0.63 MG/DL — SIGNIFICANT CHANGE UP (ref 0.5–1.3)
EGFR: 100 ML/MIN/1.73M2 — SIGNIFICANT CHANGE UP
EOSINOPHIL # BLD AUTO: 0.08 K/UL — SIGNIFICANT CHANGE UP (ref 0–0.5)
EOSINOPHIL NFR BLD AUTO: 0.8 % — SIGNIFICANT CHANGE UP (ref 0–6)
GLUCOSE SERPL-MCNC: 146 MG/DL — HIGH (ref 70–99)
HCT VFR BLD CALC: 35.8 % — SIGNIFICANT CHANGE UP (ref 34.5–45)
HGB BLD-MCNC: 11.6 G/DL — SIGNIFICANT CHANGE UP (ref 11.5–15.5)
IMM GRANULOCYTES NFR BLD AUTO: 0.2 % — SIGNIFICANT CHANGE UP (ref 0–0.9)
LYMPHOCYTES # BLD AUTO: 2.69 K/UL — SIGNIFICANT CHANGE UP (ref 1–3.3)
LYMPHOCYTES # BLD AUTO: 28.3 % — SIGNIFICANT CHANGE UP (ref 13–44)
MAGNESIUM SERPL-MCNC: 1.9 MG/DL — SIGNIFICANT CHANGE UP (ref 1.6–2.6)
MCHC RBC-ENTMCNC: 28.9 PG — SIGNIFICANT CHANGE UP (ref 27–34)
MCHC RBC-ENTMCNC: 32.4 GM/DL — SIGNIFICANT CHANGE UP (ref 32–36)
MCV RBC AUTO: 89.3 FL — SIGNIFICANT CHANGE UP (ref 80–100)
MONOCYTES # BLD AUTO: 0.57 K/UL — SIGNIFICANT CHANGE UP (ref 0–0.9)
MONOCYTES NFR BLD AUTO: 6 % — SIGNIFICANT CHANGE UP (ref 2–14)
NEUTROPHILS # BLD AUTO: 6.1 K/UL — SIGNIFICANT CHANGE UP (ref 1.8–7.4)
NEUTROPHILS NFR BLD AUTO: 64.3 % — SIGNIFICANT CHANGE UP (ref 43–77)
NRBC # BLD: 0 /100 WBCS — SIGNIFICANT CHANGE UP (ref 0–0)
PLATELET # BLD AUTO: 243 K/UL — SIGNIFICANT CHANGE UP (ref 150–400)
POTASSIUM SERPL-MCNC: 3.6 MMOL/L — SIGNIFICANT CHANGE UP (ref 3.5–5.3)
POTASSIUM SERPL-SCNC: 3.6 MMOL/L — SIGNIFICANT CHANGE UP (ref 3.5–5.3)
PROT SERPL-MCNC: 6.7 G/DL — SIGNIFICANT CHANGE UP (ref 6–8.3)
RBC # BLD: 4.01 M/UL — SIGNIFICANT CHANGE UP (ref 3.8–5.2)
RBC # FLD: 12.1 % — SIGNIFICANT CHANGE UP (ref 10.3–14.5)
SODIUM SERPL-SCNC: 140 MMOL/L — SIGNIFICANT CHANGE UP (ref 135–145)
WBC # BLD: 9.5 K/UL — SIGNIFICANT CHANGE UP (ref 3.8–10.5)
WBC # FLD AUTO: 9.5 K/UL — SIGNIFICANT CHANGE UP (ref 3.8–10.5)

## 2024-03-07 PROCEDURE — 99222 1ST HOSP IP/OBS MODERATE 55: CPT | Mod: 25

## 2024-03-07 PROCEDURE — 31575 DIAGNOSTIC LARYNGOSCOPY: CPT

## 2024-03-07 PROCEDURE — 99232 SBSQ HOSP IP/OBS MODERATE 35: CPT

## 2024-03-07 RX ORDER — ENOXAPARIN SODIUM 100 MG/ML
30 INJECTION SUBCUTANEOUS
Refills: 0 | Status: DISCONTINUED | OUTPATIENT
Start: 2024-03-07 | End: 2024-03-25

## 2024-03-07 RX ORDER — POTASSIUM CHLORIDE 20 MEQ
40 PACKET (EA) ORAL ONCE
Refills: 0 | Status: DISCONTINUED | OUTPATIENT
Start: 2024-03-07 | End: 2024-03-07

## 2024-03-07 RX ORDER — POTASSIUM CHLORIDE 20 MEQ
20 PACKET (EA) ORAL DAILY
Refills: 0 | Status: DISCONTINUED | OUTPATIENT
Start: 2024-03-07 | End: 2024-03-25

## 2024-03-07 RX ADMIN — AMLODIPINE BESYLATE 10 MILLIGRAM(S): 2.5 TABLET ORAL at 05:35

## 2024-03-07 RX ADMIN — Medication 3 MILLIGRAM(S): at 20:12

## 2024-03-07 RX ADMIN — Medication 500000 UNIT(S): at 12:17

## 2024-03-07 RX ADMIN — Medication 500000 UNIT(S): at 17:49

## 2024-03-07 RX ADMIN — ENOXAPARIN SODIUM 30 MILLIGRAM(S): 100 INJECTION SUBCUTANEOUS at 20:57

## 2024-03-07 RX ADMIN — Medication 81 MILLIGRAM(S): at 12:16

## 2024-03-07 RX ADMIN — LISINOPRIL 20 MILLIGRAM(S): 2.5 TABLET ORAL at 05:34

## 2024-03-07 RX ADMIN — Medication 500000 UNIT(S): at 05:35

## 2024-03-07 RX ADMIN — SENNA PLUS 2 TABLET(S): 8.6 TABLET ORAL at 20:12

## 2024-03-07 RX ADMIN — ATORVASTATIN CALCIUM 80 MILLIGRAM(S): 80 TABLET, FILM COATED ORAL at 20:12

## 2024-03-07 RX ADMIN — Medication 500000 UNIT(S): at 23:00

## 2024-03-07 RX ADMIN — Medication 20 MILLIEQUIVALENT(S): at 12:17

## 2024-03-07 RX ADMIN — Medication 1 APPLICATION(S): at 05:35

## 2024-03-07 RX ADMIN — Medication 1 APPLICATION(S): at 12:19

## 2024-03-07 RX ADMIN — POLYETHYLENE GLYCOL 3350 17 GRAM(S): 17 POWDER, FOR SOLUTION ORAL at 07:49

## 2024-03-07 RX ADMIN — CLOPIDOGREL BISULFATE 75 MILLIGRAM(S): 75 TABLET, FILM COATED ORAL at 12:16

## 2024-03-07 RX ADMIN — GABAPENTIN 200 MILLIGRAM(S): 400 CAPSULE ORAL at 20:13

## 2024-03-07 RX ADMIN — MAGNESIUM OXIDE 400 MG ORAL TABLET 400 MILLIGRAM(S): 241.3 TABLET ORAL at 12:16

## 2024-03-07 RX ADMIN — MAGNESIUM OXIDE 400 MG ORAL TABLET 400 MILLIGRAM(S): 241.3 TABLET ORAL at 07:49

## 2024-03-07 RX ADMIN — Medication 1 APPLICATION(S): at 20:13

## 2024-03-07 NOTE — PROGRESS NOTE ADULT - SUBJECTIVE AND OBJECTIVE BOX
Patient is a 63y old  Female who presents with a chief complaint of L MCA CVA with expressive aphasia, right hemiparesis, and right facial droop (06 Mar 2024 09:33)      Subjective and overnight events:  Patient seen and examined at bedside. no new complaints. slept well last night. no fever, chills, sob, cp, abd pain     ALLERGIES:  Allergy Status Unknown    MEDICATIONS  (STANDING):  amLODIPine   Tablet 10 milliGRAM(s) Oral daily  AQUAPHOR (petrolatum Ointment) 1 Application(s) Topical three times a day  aspirin enteric coated 81 milliGRAM(s) Oral daily  atorvastatin 80 milliGRAM(s) Oral at bedtime  clopidogrel Tablet 75 milliGRAM(s) Oral daily  dextrose 5%. 1000 milliLiter(s) (50 mL/Hr) IV Continuous <Continuous>  dextrose 50% Injectable 25 Gram(s) IV Push once  dextrose 50% Injectable 12.5 Gram(s) IV Push once  enoxaparin Injectable 30 milliGRAM(s) SubCutaneous every 24 hours  gabapentin 200 milliGRAM(s) Oral at bedtime  glucagon  Injectable 1 milliGRAM(s) IntraMuscular once  lisinopril 20 milliGRAM(s) Oral daily  magnesium oxide 400 milliGRAM(s) Oral three times a day with meals  nystatin    Suspension 342497 Unit(s) Oral four times a day  potassium chloride   Powder 40 milliEquivalent(s) Oral once  senna 2 Tablet(s) Oral at bedtime    MEDICATIONS  (PRN):  acetaminophen     Tablet .. 650 milliGRAM(s) Oral every 6 hours PRN Temp greater or equal to 38C (100.4F), Mild Pain (1 - 3)  aluminum hydroxide/magnesium hydroxide/simethicone Suspension 30 milliLiter(s) Oral every 4 hours PRN Dyspepsia  melatonin 3 milliGRAM(s) Oral at bedtime PRN Insomnia  ondansetron   Disintegrating Tablet 4 milliGRAM(s) Oral every 8 hours PRN Nausea and/or Vomiting  polyethylene glycol 3350 17 Gram(s) Oral daily PRN Constipation    Vital Signs Last 24 Hrs  T(F): 97.8 (07 Mar 2024 07:54), Max: 97.9 (06 Mar 2024 21:08)  HR: 69 (07 Mar 2024 07:54) (69 - 85)  BP: 136/77 (07 Mar 2024 07:54) (136/77 - 163/82)  RR: 16 (07 Mar 2024 07:54) (16 - 16)  SpO2: 98% (07 Mar 2024 07:54) (98% - 99%)  I&O's Summary    PHYSICAL EXAM:  General: NAD, Awake alert answering questions appropriately   ENT: MMM  Neck: Supple, No JVD  Lungs: Clear to auscultation bilaterally  Cardio: RRR, S1/S2, No murmurs  Abdomen: Soft, Nontender, Nondistended; Bowel sounds present  Extremities: No calf tenderness, No pitting edema    LABS:                        11.6   9.50  )-----------( 243      ( 07 Mar 2024 07:25 )             35.8     03-07    140  |  105  |  14  ----------------------------<  146  3.6   |  27  |  0.63    Ca    9.1      07 Mar 2024 07:25  Mg     1.9     03-07    TPro  6.7  /  Alb  3.4  /  TBili  0.8  /  DBili  x   /  AST  25  /  ALT  30  /  AlkPhos  43  03-07        03-02 Chol 165 mg/dL LDL -- HDL 54 mg/dL Trig 92 mg/dL          Urinalysis Basic - ( 07 Mar 2024 07:25 )    Color: x / Appearance: x / SG: x / pH: x  Gluc: 146 mg/dL / Ketone: x  / Bili: x / Urobili: x   Blood: x / Protein: x / Nitrite: x   Leuk Esterase: x / RBC: x / WBC x   Sq Epi: x / Non Sq Epi: x / Bacteria: x            RADIOLOGY & ADDITIONAL TESTS:    Care Discussed with Consultants/Other Providers:

## 2024-03-07 NOTE — CONSULT NOTE ADULT - SUBJECTIVE AND OBJECTIVE BOX
Patient is a 63y old  Female who presents with a chief complaint of L MCA CVA with expressive aphasia, right hemiparesis, and right facial droop (07 Mar 2024 11:46)      HPI:  This ia a 62 YO female RH dominant with PMH HTN, HLD, DM2, CVA (10/2023), who presented to  [facility] ED on 3/1/24  following a fall at home. According to the partner, Connie, the patient was in her usual state of health up until 5 days prior to admission when she was noted to have right sided facial, arm and leg weakness, aphasia and episodes of confusion. She took ASA 81mg with improvement of symptoms; pt was able to ambulate and function at home but felt weak. On the day prior to admission, the patient fell prompting her partner to bring her to the ED.    On arrival, pt noted to have dense right sided facial droop and right sided hemiparesis and aphasia.  MRI brain demonstrated multiple small acute infarctions in the left MCA distribution and chronic ischemic changes from prior strokes. CTA head/neck: negative for LVO occlusion. Patient was evaluated by neurology, and recommended for ASA & plavix x 3 months.    Patient was evaluated by PM&R and therapy for functional deficits, gait/ADL impairments and acute rehabilitation was recommended. Patient was discharged to  [facility] on 3/4/24. (04 Mar 2024 13:15)      Interval Events:  Called to see and evaluate Ms. Hobbs for dysphonia.  Patient is a poor historian but her partner was at beside who reports that her voice has been different since her CVA.  She is tolerating a soft diet and the patient was relatively cooperative.    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 Has a tub shower. Owns a walker/cane but does not use it.  Lives with partner and their cats. Her partner Connie was responsible for finances, shopping, etc  PTA: Independent in ADLs and ambulation     CURRENT FUNCTIONAL STATUS  Date: 3/4  Bed Mobility: min a, 1 person  Transfers: min a, 2 person  Gait: min a, 2 person, 10ft with hemicane  Upper Body Dressing: min a (04 Mar 2024 13:15)      FAMILY HISTORY:   Patient and partner do not know family history.    REVIEW OF SYSTEMS:     CONSTITUTIONAL: No weakness, fevers or chills     EYES/ENT: No visual changes;  No vertigo or throat pain      NECK: No pain or stiffness     RESPIRATORY: No cough, wheezing, hemoptysis; No shortness of breath     CARDIOVASCULAR: No chest pain or palpitations     GASTROINTESTINAL: No abdominal or epigastric pain. No nausea, vomiting, or hematemesis; No diarrhea or constipation. No melena or hematochezia.     GENITOURINARY: No dysuria, frequency or hematuria     NEUROLOGICAL: No numbness, +right-sided weakness     SKIN: No itching, burning, rashes, or lesions   All other review of systems is negative unless indicated above.    MEDICATIONS  (STANDING):  amLODIPine   Tablet 10 milliGRAM(s) Oral daily  AQUAPHOR (petrolatum Ointment) 1 Application(s) Topical three times a day  aspirin enteric coated 81 milliGRAM(s) Oral daily  atorvastatin 80 milliGRAM(s) Oral at bedtime  clopidogrel Tablet 75 milliGRAM(s) Oral daily  dextrose 5%. 1000 milliLiter(s) (50 mL/Hr) IV Continuous <Continuous>  dextrose 50% Injectable 25 Gram(s) IV Push once  dextrose 50% Injectable 12.5 Gram(s) IV Push once  enoxaparin Injectable 30 milliGRAM(s) SubCutaneous every 24 hours  gabapentin 200 milliGRAM(s) Oral at bedtime  glucagon  Injectable 1 milliGRAM(s) IntraMuscular once  lisinopril 20 milliGRAM(s) Oral daily  nystatin    Suspension 064127 Unit(s) Oral four times a day  potassium chloride   Powder 20 milliEquivalent(s) Oral daily  senna 2 Tablet(s) Oral at bedtime    MEDICATIONS  (PRN):  acetaminophen     Tablet .. 650 milliGRAM(s) Oral every 6 hours PRN Temp greater or equal to 38C (100.4F), Mild Pain (1 - 3)  aluminum hydroxide/magnesium hydroxide/simethicone Suspension 30 milliLiter(s) Oral every 4 hours PRN Dyspepsia  melatonin 3 milliGRAM(s) Oral at bedtime PRN Insomnia  ondansetron   Disintegrating Tablet 4 milliGRAM(s) Oral every 8 hours PRN Nausea and/or Vomiting  polyethylene glycol 3350 17 Gram(s) Oral daily PRN Constipation                            11.6   9.50  )-----------( 243      ( 07 Mar 2024 07:25 )             35.8     03-07    140  |  105  |  14  ----------------------------<  146<H>  3.6   |  27  |  0.63    Ca    9.1      07 Mar 2024 07:25  Mg     1.9     03-07    TPro  6.7  /  Alb  3.4  /  TBili  0.8  /  DBili  x   /  AST  25  /  ALT  30  /  AlkPhos  43  03-07    I&O's Detail    Vital Signs Last 24 Hrs  T(C): 36.6 (07 Mar 2024 07:54), Max: 36.6 (06 Mar 2024 21:08)  T(F): 97.8 (07 Mar 2024 07:54), Max: 97.9 (06 Mar 2024 21:08)  HR: 69 (07 Mar 2024 07:54) (69 - 85)  BP: 136/77 (07 Mar 2024 07:54) (136/77 - 163/82)  BP(mean): --  RR: 16 (07 Mar 2024 07:54) (16 - 16)  SpO2: 98% (07 Mar 2024 07:54) (98% - 99%)    Parameters below as of 07 Mar 2024 07:54  Patient On (Oxygen Delivery Method): room air      CBC Full  -  ( 07 Mar 2024 07:25 )  WBC Count : 9.50 K/uL  RBC Count : 4.01 M/uL  Hemoglobin : 11.6 g/dL  Hematocrit : 35.8 %  Platelet Count - Automated : 243 K/uL  Mean Cell Volume : 89.3 fl  Mean Cell Hemoglobin : 28.9 pg  Mean Cell Hemoglobin Concentration : 32.4 gm/dL  Auto Neutrophil # : 6.10 K/uL  Auto Lymphocyte # : 2.69 K/uL  Auto Monocyte # : 0.57 K/uL  Auto Eosinophil # : 0.08 K/uL  Auto Basophil # : 0.04 K/uL  Auto Neutrophil % : 64.3 %  Auto Lymphocyte % : 28.3 %  Auto Monocyte % : 6.0 %  Auto Eosinophil % : 0.8 %  Auto Basophil % : 0.4 %        PHYSICAL EXAM:     Constitutional Normal: well nourished, well developed, non-dysmorphic, no acute distress     Psychiatric: age appropriate behavior, cooperative     Skin: no obvious skin lesions     Head: Normocephalic, dressing intact.     Lymphatic: no cervical lymphadenopathy     ENT:        External Ear:  Normal without any obvious lesions        External Nose:  Normal, no structural deformities		        Anterior Nasal Cavity: Dry, crusty mucosa R>L, no turbinate hypertrophy, +left nasal valve collapse      Oral Cavity:  poor dentition, right tongue deviation, no lesions or ulcerations, uvula midline     Neck: No palpable lymphadenopathy     Pulmonary: No Acute Distress.      CV: no peripheral edema/cyanosis     GI: nondistended, nontender     Peripheral vascular: no JVD or edema     Neurologic: awake and alert, +right facial weakness, +right upper extremity weakness.    LARYNGOSCOPY EXAM (Scope #G5):      -Verbal consent was obtained from patient prior to procedure.       Indication: dysphonia       The patient was unable to tolerate passing the laryngoscopy intranasally but agreed and tolerated passing the scope orally.           Flexible laryngoscopy was performed and revealed the following:       -- Nasopharynx was not evaluated       -- Base of tongue was not completely visualized        -- Vallecula was clear       -- Epiglottis, both aryepiglottic folds and both false vocal folds were normal       -- Arytenoids both without edema and erythema        -- True vocal folds were fully mobile and without lesions.        -- Post cricoid area was clear.       -- Interarytenoid edema was absent       The patient tolerated the procedure well.

## 2024-03-07 NOTE — PROGRESS NOTE ADULT - SUBJECTIVE AND OBJECTIVE BOX
Patient is a 63y old  Female who presents with a chief complaint of L MCA CVA with expressive aphasia, right hemiparesis, and right facial droop (07 Mar 2024 09:53)      HPI:  This ia a 62 YO female RH dominant with PMH HTN, HLD, DM2, CVA (10/2023), who presented to  [facility] ED on 3/1/24  following a fall at home. According to the partner, Connie, the patient was in her usual state of health up until 5 days prior to admission when she was noted to have right sided facial, arm and leg weakness, aphasia and episodes of confusion. She took ASA 81mg with improvement of symptoms; pt was able to ambulate and function at home but felt weak. On the day prior to admission, the patient fell prompting her partner to bring her to the ED.    On arrival, pt noted to have dense right sided facial droop and right sided hemiparesis and aphasia.  MRI brain demonstrated multiple small acute infarctions in the left MCA distribution and chronic ischemic changes from prior strokes. CTA head/neck: negative for LVO occlusion. Patient was evaluated by neurology, and recommended for ASA & plavix x 3 months.    Patient was evaluated by PM&R and therapy for functional deficits, gait/ADL impairments and acute rehabilitation was recommended. Patient was discharged to  [facility] on 3/4/24. (04 Mar 2024 13:15)      PAST MEDICAL & SURGICAL HISTORY:  HTN (hypertension)      DM (diabetes mellitus)      CVA (cerebrovascular accident)      No significant past surgical history          MEDICATIONS  (STANDING):  amLODIPine   Tablet 10 milliGRAM(s) Oral daily  AQUAPHOR (petrolatum Ointment) 1 Application(s) Topical three times a day  aspirin enteric coated 81 milliGRAM(s) Oral daily  atorvastatin 80 milliGRAM(s) Oral at bedtime  clopidogrel Tablet 75 milliGRAM(s) Oral daily  dextrose 5%. 1000 milliLiter(s) (50 mL/Hr) IV Continuous <Continuous>  dextrose 50% Injectable 25 Gram(s) IV Push once  dextrose 50% Injectable 12.5 Gram(s) IV Push once  enoxaparin Injectable 30 milliGRAM(s) SubCutaneous every 24 hours  gabapentin 200 milliGRAM(s) Oral at bedtime  glucagon  Injectable 1 milliGRAM(s) IntraMuscular once  lisinopril 20 milliGRAM(s) Oral daily  magnesium oxide 400 milliGRAM(s) Oral three times a day with meals  nystatin    Suspension 781979 Unit(s) Oral four times a day  potassium chloride   Powder 20 milliEquivalent(s) Oral daily  senna 2 Tablet(s) Oral at bedtime    MEDICATIONS  (PRN):  acetaminophen     Tablet .. 650 milliGRAM(s) Oral every 6 hours PRN Temp greater or equal to 38C (100.4F), Mild Pain (1 - 3)  aluminum hydroxide/magnesium hydroxide/simethicone Suspension 30 milliLiter(s) Oral every 4 hours PRN Dyspepsia  melatonin 3 milliGRAM(s) Oral at bedtime PRN Insomnia  ondansetron   Disintegrating Tablet 4 milliGRAM(s) Oral every 8 hours PRN Nausea and/or Vomiting  polyethylene glycol 3350 17 Gram(s) Oral daily PRN Constipation      Allergies    Allergy Status Unknown    Intolerances          VITALS  63y  Vital Signs Last 24 Hrs  T(C): 36.6 (07 Mar 2024 07:54), Max: 36.6 (06 Mar 2024 21:08)  T(F): 97.8 (07 Mar 2024 07:54), Max: 97.9 (06 Mar 2024 21:08)  HR: 69 (07 Mar 2024 07:54) (69 - 85)  BP: 136/77 (07 Mar 2024 07:54) (136/77 - 163/82)  BP(mean): --  RR: 16 (07 Mar 2024 07:54) (16 - 16)  SpO2: 98% (07 Mar 2024 07:54) (98% - 99%)    Parameters below as of 07 Mar 2024 07:54  Patient On (Oxygen Delivery Method): room air      Daily     Daily         RECENT LABS:                          11.6   9.50  )-----------( 243      ( 07 Mar 2024 07:25 )             35.8     03-07    140  |  105  |  14  ----------------------------<  146<H>  3.6   |  27  |  0.63    Ca    9.1      07 Mar 2024 07:25  Mg     1.9     03-07    TPro  6.7  /  Alb  3.4  /  TBili  0.8  /  DBili  x   /  AST  25  /  ALT  30  /  AlkPhos  43  03-07    LIVER FUNCTIONS - ( 07 Mar 2024 07:25 )  Alb: 3.4 g/dL / Pro: 6.7 g/dL / ALK PHOS: 43 U/L / ALT: 30 U/L / AST: 25 U/L / GGT: x             Urinalysis Basic - ( 07 Mar 2024 07:25 )    Color: x / Appearance: x / SG: x / pH: x  Gluc: 146 mg/dL / Ketone: x  / Bili: x / Urobili: x   Blood: x / Protein: x / Nitrite: x   Leuk Esterase: x / RBC: x / WBC x   Sq Epi: x / Non Sq Epi: x / Bacteria: x          CAPILLARY BLOOD GLUCOSE

## 2024-03-07 NOTE — CONSULT NOTE ADULT - PROBLEM SELECTOR RECOMMENDATION 9
-  Patient had difficulty tolerating laryngoscopy through nose  -  +++Dried nasal crusting  -  Nasal saline spray TID  -  Patient tolerated laryngoscopy through her mouth  -  Vocal cords mobile bilaterally, no pooling, no lesions  -  Continue Speech therapy  -  No further ENT intervention at this time

## 2024-03-07 NOTE — PROGRESS NOTE ADULT - COMMENTS
Patient seen with partner at bedside. Connie notices that the patient has been increasingly frustrated, sometimes seeming upset. We spoke about potential causes, including some increased insight and awareness and the incidences seem to be triggered by frustration over aphasia, and they are agreeable to rec therapy consult. Possible medication management if mood declines discussed but they are not interested in that at this time    Splinting, ROM/stretch for right UE spasticity also discussed. Comfy splint in place

## 2024-03-07 NOTE — PROGRESS NOTE ADULT - ASSESSMENT
64 YO female with PMH HTN, HLD, DM2, CVA (10/2023), who presented to [facility] ED on 3/1/24 with right sided facial, right arm and leg weakness, aphasia and episodes of confusion as well as a fall. MRI brain demonstrated multiple small acute infarctions in the left MCA distribution  Patient now admitted with gait Instability, ADL impairments and Functional impairments.    #Left MCA CVA with expressive aphasia, right hemiparesis, and right facial droop  - MRI 3/1: Multiple foci of diffusion restriction scattered throughout the left MCA vascular distribution, compatible with acute infarctions. Chronic lacunar infarctions in the right centrum semiovale/corona radiata and left hemipons. Encephalomalacia/gliotic change in the anterior right temporal lobe. Foci of susceptibility artifact in the right basal ganglia, likely sequela of prior micro-hemorrhages.  - ASA & plavix for 3 months, Discontinue aspirin after 3 months  - High dose statin  - Continue Comprehensive Rehab Program: PT/OT/ST, 3hours daily and 5 days weekly  - right elbow comfy splint to improve extension, right resting hand splint  - recreation therapy  - Precautions: fall, aspiration, cardiac, DM, AMS    # dysphonia  - ENT consult eval VC today    # HTN  - Amlodipine increased to 10 mg daily  - Lisinopril 20mg daily  - BP this AM 3/7 136/77 much better controlled    # HLD  - Lipitor 80mg daily    # Diabetes Mellitus Type 2 without complications  - A1c 5.5 on 3/2/24  - ISS and FS  - Hospitalist appreciated. check FS before breakfast, c/w ISS. IF well controlled over next 24-48hrs, will d/c FS  -  3/6    # hypokalemia  - K+ 3.3 3/5. Replete 40 meq x 1  - K+ 3.6 3/7. Will start on 20 meq daily, home medication dose as confirmed with partner    # hypomagnesemia  - Mg++ 1.5  - Mg oxide supplemented x 2 days  - Mg++  1.9  3/7    # Pain management  - Tylenol PRN  - gabapentin 200 mg QHS 3/4 initiated for neuropathic pain    # Bowel Regimen  - Senna  - miralax PRN    # Sleep:   - Melatonin 3mg nightly PRN    # FEN   - Diet: upgraded to minced/moist and thin liquids 3/5  - SLP and nutrition consults    # DVT ppx  - Lovenox    # Case discussed in IDT rounds 3/6 (initial):  - mod-severe mixed expressive and receptive aphasia, +right shelby inattention, +aphasic, max-total assist toilet transfers and max assist UB dressing and bathing, total LB dressing and toileting. Min assist bed mobility, mod assist transfers, ambulates 10 feet with parallel bars with mod-max assist +occ impulsive  - goals: supervision waking hours due to cog-language deficits, CG transfers, CG/min assist ambulation and stairs, CG bADLs  - target: dc home 3/23 with caregiver support and home care referral PT OT SLP  - caregiver training      # LABS  ENT consult  CBC CMP Mg++ 3/11    #GOC  CODE STATUS: FULL CODE    Outpatient Follow-up (Specialty/Name of physician):    Jacobo Miramontes  Physical/Rehab Medicine  [address] [place] [id number]  Phone: [phone]  Fax: [phone]  Follow Up Time:

## 2024-03-07 NOTE — PROGRESS NOTE ADULT - ASSESSMENT
64 YO female with PMH HTN, HLD, DM2, CVA (10/2023), who presented to [facility] ED on 3/1/24 with right sided facial, right arm and leg weakness, aphasia and episodes of confusion as well as a fall. MRI brain demonstrated multiple small acute infarctions in the left MCA distribution  Patient now admitted with gait Instability, ADL impairments and Functional impairments.    #Left MCA CVA with expressive aphasia, right hemiparesis, and right facial droop  - ASA & plavix for 3 months, discontinue aspirin after 3 months  - Lipitor 80mg qhs    # HTN  - Amlodipine increased to 10mg due to uncontrolled bp march 6  - Lisinopril 20mg daily  - BP better controlled now    # HLD  - Lipitor 80mg daily    # Diabetes Mellitus Type 2 without complications  - A1c 5.5 on 3/2/24  - ISS  - DC Accu-Cheks since FS well controlled    DVT ppx- Lovenox

## 2024-03-07 NOTE — CONSULT NOTE ADULT - REASON FOR ADMISSION
L MCA CVA with expressive aphasia, right hemiparesis, and right facial droop
L MCA CVA with expressive aphasia, right hemiparesis, and right facial droop

## 2024-03-08 PROCEDURE — 99232 SBSQ HOSP IP/OBS MODERATE 35: CPT

## 2024-03-08 PROCEDURE — 93970 EXTREMITY STUDY: CPT | Mod: 26

## 2024-03-08 RX ORDER — SODIUM CHLORIDE 0.65 %
1 AEROSOL, SPRAY (ML) NASAL THREE TIMES A DAY
Refills: 0 | Status: DISCONTINUED | OUTPATIENT
Start: 2024-03-08 | End: 2024-03-25

## 2024-03-08 RX ORDER — DONEPEZIL HYDROCHLORIDE 10 MG/1
5 TABLET, FILM COATED ORAL
Refills: 0 | Status: DISCONTINUED | OUTPATIENT
Start: 2024-03-08 | End: 2024-03-25

## 2024-03-08 RX ADMIN — Medication 500000 UNIT(S): at 23:43

## 2024-03-08 RX ADMIN — Medication 10 MILLIGRAM(S): at 19:50

## 2024-03-08 RX ADMIN — ENOXAPARIN SODIUM 30 MILLIGRAM(S): 100 INJECTION SUBCUTANEOUS at 21:12

## 2024-03-08 RX ADMIN — LISINOPRIL 20 MILLIGRAM(S): 2.5 TABLET ORAL at 05:46

## 2024-03-08 RX ADMIN — Medication 1 SPRAY(S): at 14:18

## 2024-03-08 RX ADMIN — SENNA PLUS 2 TABLET(S): 8.6 TABLET ORAL at 21:12

## 2024-03-08 RX ADMIN — Medication 1 APPLICATION(S): at 21:22

## 2024-03-08 RX ADMIN — ATORVASTATIN CALCIUM 80 MILLIGRAM(S): 80 TABLET, FILM COATED ORAL at 21:12

## 2024-03-08 RX ADMIN — Medication 1 APPLICATION(S): at 05:53

## 2024-03-08 RX ADMIN — GABAPENTIN 200 MILLIGRAM(S): 400 CAPSULE ORAL at 21:12

## 2024-03-08 RX ADMIN — Medication 20 MILLIEQUIVALENT(S): at 12:51

## 2024-03-08 RX ADMIN — Medication 3 MILLIGRAM(S): at 21:15

## 2024-03-08 RX ADMIN — Medication 500000 UNIT(S): at 12:51

## 2024-03-08 RX ADMIN — Medication 1 APPLICATION(S): at 14:19

## 2024-03-08 RX ADMIN — AMLODIPINE BESYLATE 10 MILLIGRAM(S): 2.5 TABLET ORAL at 05:46

## 2024-03-08 RX ADMIN — CLOPIDOGREL BISULFATE 75 MILLIGRAM(S): 75 TABLET, FILM COATED ORAL at 12:51

## 2024-03-08 RX ADMIN — Medication 500000 UNIT(S): at 05:46

## 2024-03-08 RX ADMIN — Medication 81 MILLIGRAM(S): at 12:52

## 2024-03-08 RX ADMIN — Medication 500000 UNIT(S): at 17:29

## 2024-03-08 NOTE — PROGRESS NOTE ADULT - ATTENDING COMMENTS
Progress note amended to include my discussions with patient, resident, hospitalist, RN, SW, PT and my findings    Patient seen working with PT in gym, parallel bars as well as partner. STanding balance in parallel bars with PT and WC follow, holds midline fairly well but has difficulty in placing LE both front and back, rigtht >> left although there is apraxia /difficulty with motor sequencing of left side as well. Probably combination for cognitive-linguistic deficits in comprehension but also apraxia as she has difficulty despite visual and tactile cues. Her vital signs are stable and she appears comfortable; however partner reports that while she was massaging the RLE yesterday, patient appeared to grimace in pain; did not recur on subsequent work on leg. Possibly soft tissue due to increased standing activities, however will also r/o DVT. Currently on lovenox, patient's partner agreeable    We discussed trial of aricept for aphasia, including indication and SE. Partner reports that another physician had suggested medication as well, and she had done some prior research, and is agreeable to trial. She was concerned whether ASA was a contraindication; we informed her it was not    Continue comprehensive rehab program    RECENT LABS    Vital Signs Last 24 Hrs  T(C): 36.6 (08 Mar 2024 09:53), Max: 36.6 (07 Mar 2024 20:28)  T(F): 97.9 (08 Mar 2024 09:53), Max: 97.9 (07 Mar 2024 20:28)  HR: 67 (08 Mar 2024 09:53) (67 - 80)  BP: 129/77 (08 Mar 2024 09:53) (129/77 - 149/76)  BP(mean): --  RR: 16 (08 Mar 2024 09:53) (16 - 16)  SpO2: 99% (08 Mar 2024 09:53) (98% - 99%)    Parameters below as of 08 Mar 2024 09:53  Patient On (Oxygen Delivery Method): room air                              11.6   9.50  )-----------( 243      ( 07 Mar 2024 07:25 )             35.8     03-07    140  |  105  |  14  ----------------------------<  146<H>  3.6   |  27  |  0.63    Ca    9.1      07 Mar 2024 07:25  Mg     1.9     03-07    TPro  6.7  /  Alb  3.4  /  TBili  0.8  /  DBili  x   /  AST  25  /  ALT  30  /  AlkPhos  43  03-07      Urinalysis Basic - ( 07 Mar 2024 07:25 )    Color: x / Appearance: x / SG: x / pH: x  Gluc: 146 mg/dL / Ketone: x  / Bili: x / Urobili: x   Blood: x / Protein: x / Nitrite: x   Leuk Esterase: x / RBC: x / WBC x   Sq Epi: x / Non Sq Epi: x / Bacteria: x      CAPILLARY BLOOD GLUCOSE

## 2024-03-08 NOTE — PROGRESS NOTE ADULT - MENTAL STATUS
+deficits with motor planning and sequencing  +expressive and receptive /mixed aphasia with reduced command following even with tactile and visual cues  +apraxia

## 2024-03-08 NOTE — PROGRESS NOTE ADULT - COMMENTS
Patient seen and examined while participating in PT. Patient able to sit-stand with Terrell and had good standing balance on parallel bars, difficulty with bringing left leg forward. Patient reports that she slept well. Denies any pain or headache. Patient's partner, Connie, noted patient had pain in right calf when moving her last night. No complaints of pain today, but advised we will check LE duplex to r/o DVT. Also discussed initiating Aricept for potential aid in word production. Partner in agreement, will start tomorrow AM. Patient seen and examined while participating in PT. Partner was also present. Patient able to sit-stand with Terrell and had good standing balance on parallel bars, difficulty with bringing left leg forward. Patient reports that she slept well. Denies any pain or headache. Patient's partner, Connie, noted patient had pain in right calf when moving her last night/massage, which later resolved. No complaints of pain today, but advised we will check LE duplex to r/o DVT. Also discussed initiating Aricept for potential aid in word production. Partner in agreement, will start tomorrow AM.

## 2024-03-08 NOTE — PROGRESS NOTE ADULT - ASSESSMENT
62 YO female with PMH HTN, HLD, DM2, CVA (10/2023), who presented to [facility] ED on 3/1/24 with right sided facial, right arm and leg weakness, aphasia and episodes of confusion as well as a fall. MRI brain demonstrated multiple small acute infarctions in the left MCA distribution  Patient now admitted with gait Instability, ADL impairments and Functional impairments.    #Left MCA CVA with expressive aphasia, right hemiparesis, and right facial droop  - MRI 3/1: Multiple foci of diffusion restriction scattered throughout the left MCA vascular distribution, compatible with acute infarctions. Chronic lacunar infarctions in the right centrum semiovale/corona radiata and left hemipons. Encephalomalacia/gliotic change in the anterior right temporal lobe. Foci of susceptibility artifact in the right basal ganglia, likely sequela of prior micro-hemorrhages.  - ASA & plavix for 3 months, Discontinue aspirin after 3 months  - High dose statin  - Continue Comprehensive Rehab Program: PT/OT/ST, 3hours daily and 5 days weekly  - right elbow comfy splint to improve extension, right resting hand splint  - recreation therapy  - Precautions: fall, aspiration, cardiac, DM, AMS  - Start Aricept 5 mg Q8AM on 3/9    #Calf pain  - follow up b/l LE duplex (3/8)    # dysphonia  - ENT consult eval VC (3/7) - difficulty tolerating laryngoscopy through nose, (+)dried nasal crusting, tolerated laryngoscopy through mouth, vocal cords mobile bilaterally, no pooling, no lesions  - Nasal saline spray TID    # HTN  - Amlodipine 10 mg daily (increased on 3/7)  - Lisinopril 20mg daily  - BP (129/77 - 149/76) 3/8     # HLD  - Lipitor 80mg daily    # Diabetes Mellitus Type 2 without complications  - A1c 5.5 on 3/2/24  - Hospitalist appreciated  - FS and ISS discontinued on 3/6    # hypokalemia  - K+ 3.3 3/5. Replete 40 meq x 1  - K+ 3.6 3/7  - KCl 20 meq daily started 3/7, home medication dose as confirmed with partner    # hypomagnesemia  - Mg++ 1.5  - Mg oxide supplemented x 2 days  - Mg++  1.9  3/7    # Pain management  - Tylenol PRN  - gabapentin 200 mg QHS 3/4 initiated for neuropathic pain    # Bowel Regimen  - Senna  - miralax PRN    # Sleep:   - Melatonin 3mg nightly PRN    # FEN   - Diet: upgraded to minced/moist and thin liquids 3/5  - SLP and nutrition consults    # DVT ppx  - Lovenox    # Case discussed in IDT rounds 3/6 (initial):  - mod-severe mixed expressive and receptive aphasia, +right shelby inattention, +aphasic, max-total assist toilet transfers and max assist UB dressing and bathing, total LB dressing and toileting. Min assist bed mobility, mod assist transfers, ambulates 10 feet with parallel bars with mod-max assist +occ impulsive  - goals: supervision waking hours due to cog-language deficits, CG transfers, CG/min assist ambulation and stairs, CG bADLs  - target: dc home 3/23 with caregiver support and home care referral PT OT SLP  - caregiver training      # LABS  CBC CMP Mg++ 3/11    #GOC  CODE STATUS: FULL CODE    Outpatient Follow-up (Specialty/Name of physician):    Jacobo Miramontes  Physical/Rehab Medicine  [address] [place] [id number]  Phone: [phone]  Fax: [phone]  Follow Up Time:       62 YO female with PMH HTN, HLD, DM2, CVA (10/2023), who presented to [facility] ED on 3/1/24 with right sided facial, right arm and leg weakness, aphasia and episodes of confusion as well as a fall. MRI brain demonstrated multiple small acute infarctions in the left MCA distribution  Patient now admitted with gait Instability, ADL impairments and Functional impairments.    #Left MCA CVA with expressive aphasia, right hemiparesis, and right facial droop  - MRI 3/1: Multiple foci of diffusion restriction scattered throughout the left MCA vascular distribution, compatible with acute infarctions. Chronic lacunar infarctions in the right centrum semiovale/corona radiata and left hemipons. Encephalomalacia/gliotic change in the anterior right temporal lobe. Foci of susceptibility artifact in the right basal ganglia, likely sequela of prior micro-hemorrhages.  - ASA & plavix for 3 months, Discontinue aspirin after 3 months  - High dose statin  - Start Aricept 5 mg Q8AM on 3/9 for aphasia and cognition. Reviewed indications with partner and SE, who is agreeable  - Continue Comprehensive Rehab Program: PT/OT/ST, 3hours daily and 5 days weekly  - right elbow comfy splint to improve extension, right resting hand splint  - recreation therapy  - Precautions: fall, aspiration, cardiac, DM, AMS    # dysphonia  - ENT consult appreicated 3/7:  eval VC,  difficulty tolerating laryngoscopy through nose, (+)dried nasal crusting, tolerated laryngoscopy through mouth, vocal cords mobile bilaterally, no pooling, no lesions  - Nasal saline spray TID    # HTN  - Amlodipine 10 mg daily (increased on 3/7)  - Lisinopril 20mg daily  - BP (129/77 - 149/76) 3/8     # HLD  - Lipitor 80mg daily    # Diabetes Mellitus Type 2 without complications  - A1c 5.5 on 3/2/24  - Hospitalist appreciated  - FS and ISS discontinued on 3/6    # hypokalemia  - K+ 3.3 3/5. Replete 40 meq x 1--> 3.6 3/7  - KCl 20 meq daily started 3/7, home medication dose as confirmed with partner  - bMP 3/11    # hypomagnesemia  - Mg oxide supplemented   - Mg++ 1.5--> 1.9 3/7    # Pain management  - Tylenol PRN  - gabapentin 200 mg QHS 3/4   - controlled    # Bowel Regimen  - Senna  - miralax PRN    # Sleep:   - Melatonin 3mg nightly PRN    # FEN   - Diet: upgraded to minced/moist and thin liquids 3/5    # DVT ppx  # report of right Calf pain  - Bilateral LE duplex (3/8) ordered, discussed with partner  - Lovenox    # Case discussed in IDT rounds 3/6 (initial):  - mod-severe mixed expressive and receptive aphasia, +right shelby inattention, +aphasic, max-total assist toilet transfers and max assist UB dressing and bathing, total LB dressing and toileting. Min assist bed mobility, mod assist transfers, ambulates 10 feet with parallel bars with mod-max assist +occ impulsive  - goals: supervision waking hours due to cog-language deficits, CG transfers, CG/min assist ambulation and stairs, CG bADLs  - target: dc home 3/23 with caregiver support and home care referral PT OT SLP  - caregiver training      # LABS  BLE doppler  CBC CMP Mg++ 3/11    #GOC  CODE STATUS: FULL CODE    Outpatient Follow-up (Specialty/Name of physician):    Jacobo Miramontes  Physical/Rehab Medicine  [address] [place] [id number]  Phone: [phone]  Fax: [phone]  Follow Up Time:       64 YO female with PMH HTN, HLD, DM2, CVA (10/2023), who presented to [facility] ED on 3/1/24 with right sided facial, right arm and leg weakness, aphasia and episodes of confusion as well as a fall. MRI brain demonstrated multiple small acute infarctions in the left MCA distribution     #Left MCA CVA with expressive aphasia, right hemiparesis, and right facial droop  - MRI 3/1: Multiple foci of diffusion restriction scattered throughout the left MCA vascular distribution, compatible with acute infarctions. Chronic lacunar infarctions in the right centrum semiovale/corona radiata and left hemipons. Encephalomalacia/gliotic change in the anterior right temporal lobe. Foci of susceptibility artifact in the right basal ganglia, likely sequela of prior micro-hemorrhages.  - ASA & plavix for 3 months, Discontinue aspirin after 3 months  - High dose statin  - Start Aricept 5 mg Q8AM on 3/9 for aphasia and cognition. Reviewed indications with partner and SE, who is agreeable  - Continue Comprehensive Rehab Program: PT/OT/ST, 3hours daily and 5 days weekly  - right elbow comfy splint to improve extension, right resting hand splint  - recreation therapy  - Precautions: fall, aspiration, cardiac, DM, AMS    # dysphonia  - ENT consult appreicated 3/7:  eval VC,  difficulty tolerating laryngoscopy through nose, (+)dried nasal crusting, tolerated laryngoscopy through mouth, vocal cords mobile bilaterally, no pooling, no lesions  - Nasal saline spray TID    # HTN  - Amlodipine 10 mg daily (increased on 3/7)  - Lisinopril 20mg daily  - BP (129/77 - 149/76) 3/8     # HLD  - Lipitor 80mg daily    # Diabetes Mellitus Type 2 without complications  - A1c 5.5 on 3/2/24  - Hospitalist appreciated  - FS and ISS discontinued on 3/6    # hypokalemia  - K+ 3.3 3/5. Replete 40 meq x 1--> 3.6 3/7  - KCl 20 meq daily started 3/7, home medication dose as confirmed with partner  - BMP 3/11    # hypomagnesemia  - Mg oxide supplemented   - Mg++ 1.5--> 1.9 3/7    # Pain management  - Tylenol PRN  - gabapentin 200 mg QHS 3/4   - controlled    # Bowel Regimen  - Senna  - miralax PRN    # Sleep:   - Melatonin 3mg nightly PRN    # FEN   - Diet: upgraded to minced/moist and thin liquids 3/5    # DVT ppx  # report of right Calf pain  - Bilateral LE duplex (3/8) ordered, discussed with partner  - Lovenox    # Case discussed in IDT rounds 3/6 (initial):  - mod-severe mixed expressive and receptive aphasia, +right shelby inattention, +aphasic, max-total assist toilet transfers and max assist UB dressing and bathing, total LB dressing and toileting. Min assist bed mobility, mod assist transfers, ambulates 10 feet with parallel bars with mod-max assist +occ impulsive  - goals: supervision waking hours due to cog-language deficits, CG transfers, CG/min assist ambulation and stairs, CG bADLs  - target: dc home 3/23 with caregiver support and home care referral PT OT SLP  - caregiver training    # LABS  BLE doppler  CBC CMP Mg++ 3/11    #GOC  CODE STATUS: FULL CODE    addendum 5:37 PM  - IMPRESSION:  No evidence of deep venous thrombosis in either lower extremity.    Family informed by team    Outpatient Follow-up (Specialty/Name of physician):    Jacobo Miramontes  Physical/Rehab Medicine  [address] [place] [id number]  Phone: [phone]  Fax: [phone]  Follow Up Time:

## 2024-03-08 NOTE — PROGRESS NOTE ADULT - SUBJECTIVE AND OBJECTIVE BOX
Patient is a 63y old  Female who presents with a chief complaint of L MCA CVA with expressive aphasia, right hemiparesis, and right facial droop (07 Mar 2024 09:53)    HPI:  This ia a 62 YO female RH dominant with PMH HTN, HLD, DM2, CVA (10/2023), who presented to  [facility] ED on 3/1/24  following a fall at home. According to the partner, Connie, the patient was in her usual state of health up until 5 days prior to admission when she was noted to have right sided facial, arm and leg weakness, aphasia and episodes of confusion. She took ASA 81mg with improvement of symptoms; pt was able to ambulate and function at home but felt weak. On the day prior to admission, the patient fell prompting her partner to bring her to the ED.    On arrival, pt noted to have dense right sided facial droop and right sided hemiparesis and aphasia.  MRI brain demonstrated multiple small acute infarctions in the left MCA distribution and chronic ischemic changes from prior strokes. CTA head/neck: negative for LVO occlusion. Patient was evaluated by neurology, and recommended for ASA & plavix x 3 months.    Patient was evaluated by PM&R and therapy for functional deficits, gait/ADL impairments and acute rehabilitation was recommended. Patient was discharged to  [facility] on 3/4/24. (04 Mar 2024 13:15)      PAST MEDICAL & SURGICAL HISTORY:  HTN (hypertension)  DM (diabetes mellitus)  CVA (cerebrovascular accident)  No significant past surgical history      MEDICATIONS  (STANDING):  amLODIPine   Tablet 10 milliGRAM(s) Oral daily  AQUAPHOR (petrolatum Ointment) 1 Application(s) Topical three times a day  aspirin enteric coated 81 milliGRAM(s) Oral daily  atorvastatin 80 milliGRAM(s) Oral at bedtime  clopidogrel Tablet 75 milliGRAM(s) Oral daily  dextrose 5%. 1000 milliLiter(s) (50 mL/Hr) IV Continuous <Continuous>  dextrose 50% Injectable 25 Gram(s) IV Push once  dextrose 50% Injectable 12.5 Gram(s) IV Push once  donepezil 5 milliGRAM(s) Oral <User Schedule>  enoxaparin Injectable 30 milliGRAM(s) SubCutaneous <User Schedule>  gabapentin 200 milliGRAM(s) Oral at bedtime  glucagon  Injectable 1 milliGRAM(s) IntraMuscular once  lisinopril 20 milliGRAM(s) Oral daily  nystatin    Suspension 853090 Unit(s) Oral four times a day  potassium chloride   Powder 20 milliEquivalent(s) Oral daily  senna 2 Tablet(s) Oral at bedtime    MEDICATIONS  (PRN):  acetaminophen     Tablet .. 650 milliGRAM(s) Oral every 6 hours PRN Temp greater or equal to 38C (100.4F), Mild Pain (1 - 3)  aluminum hydroxide/magnesium hydroxide/simethicone Suspension 30 milliLiter(s) Oral every 4 hours PRN Dyspepsia  melatonin 3 milliGRAM(s) Oral at bedtime PRN Insomnia  ondansetron   Disintegrating Tablet 4 milliGRAM(s) Oral every 8 hours PRN Nausea and/or Vomiting  polyethylene glycol 3350 17 Gram(s) Oral daily PRN Constipation      Allergies    Allergy Status Unknown    Intolerances          VITALS  63y  Vital Signs Last 24 Hrs  T(C): 36.6 (08 Mar 2024 09:53), Max: 36.6 (07 Mar 2024 20:28)  T(F): 97.9 (08 Mar 2024 09:53), Max: 97.9 (07 Mar 2024 20:28)  HR: 67 (08 Mar 2024 09:53) (67 - 80)  BP: 129/77 (08 Mar 2024 09:53) (129/77 - 149/76)  BP(mean): --  RR: 16 (08 Mar 2024 09:53) (16 - 16)  SpO2: 99% (08 Mar 2024 09:53) (98% - 99%)    Parameters below as of 08 Mar 2024 09:53  Patient On (Oxygen Delivery Method): room air      RECENT LABS:                          11.6   9.50  )-----------( 243      ( 07 Mar 2024 07:25 )             35.8     03-07    140  |  105  |  14  ----------------------------<  146<H>  3.6   |  27  |  0.63    Ca    9.1      07 Mar 2024 07:25  Mg     1.9     03-07    TPro  6.7  /  Alb  3.4  /  TBili  0.8  /  DBili  x   /  AST  25  /  ALT  30  /  AlkPhos  43  03-07    LIVER FUNCTIONS - ( 07 Mar 2024 07:25 )  Alb: 3.4 g/dL / Pro: 6.7 g/dL / ALK PHOS: 43 U/L / ALT: 30 U/L / AST: 25 U/L / GGT: x

## 2024-03-09 PROCEDURE — 99232 SBSQ HOSP IP/OBS MODERATE 35: CPT

## 2024-03-09 RX ADMIN — Medication 1 APPLICATION(S): at 05:38

## 2024-03-09 RX ADMIN — CLOPIDOGREL BISULFATE 75 MILLIGRAM(S): 75 TABLET, FILM COATED ORAL at 12:50

## 2024-03-09 RX ADMIN — DONEPEZIL HYDROCHLORIDE 5 MILLIGRAM(S): 10 TABLET, FILM COATED ORAL at 07:51

## 2024-03-09 RX ADMIN — Medication 20 MILLIEQUIVALENT(S): at 12:51

## 2024-03-09 RX ADMIN — Medication 500000 UNIT(S): at 17:37

## 2024-03-09 RX ADMIN — Medication 1 APPLICATION(S): at 20:27

## 2024-03-09 RX ADMIN — GABAPENTIN 200 MILLIGRAM(S): 400 CAPSULE ORAL at 20:17

## 2024-03-09 RX ADMIN — Medication 1 SPRAY(S): at 14:44

## 2024-03-09 RX ADMIN — ATORVASTATIN CALCIUM 80 MILLIGRAM(S): 80 TABLET, FILM COATED ORAL at 20:17

## 2024-03-09 RX ADMIN — Medication 500000 UNIT(S): at 23:00

## 2024-03-09 RX ADMIN — LISINOPRIL 20 MILLIGRAM(S): 2.5 TABLET ORAL at 05:38

## 2024-03-09 RX ADMIN — Medication 500000 UNIT(S): at 05:37

## 2024-03-09 RX ADMIN — Medication 1 SPRAY(S): at 05:45

## 2024-03-09 RX ADMIN — AMLODIPINE BESYLATE 10 MILLIGRAM(S): 2.5 TABLET ORAL at 05:37

## 2024-03-09 RX ADMIN — Medication 500000 UNIT(S): at 12:51

## 2024-03-09 RX ADMIN — Medication 81 MILLIGRAM(S): at 12:50

## 2024-03-09 RX ADMIN — ENOXAPARIN SODIUM 30 MILLIGRAM(S): 100 INJECTION SUBCUTANEOUS at 20:17

## 2024-03-09 RX ADMIN — SENNA PLUS 2 TABLET(S): 8.6 TABLET ORAL at 20:18

## 2024-03-09 RX ADMIN — Medication 1 SPRAY(S): at 20:17

## 2024-03-09 RX ADMIN — Medication 3 MILLIGRAM(S): at 20:18

## 2024-03-09 RX ADMIN — Medication 1 APPLICATION(S): at 14:46

## 2024-03-09 NOTE — PROGRESS NOTE ADULT - SUBJECTIVE AND OBJECTIVE BOX
Patient is a 63y old  Female who presents with a chief complaint of L MCA CVA with expressive aphasia, right hemiparesis, and right facial droop (09 Mar 2024 09:31)      Subjective and overnight events:  Patient seen and examined at bedside. no new complaints. no fever, chills, sob, cp, abd pain. + BM    ALLERGIES:  Allergy Status Unknown    MEDICATIONS  (STANDING):  amLODIPine   Tablet 10 milliGRAM(s) Oral daily  AQUAPHOR (petrolatum Ointment) 1 Application(s) Topical three times a day  aspirin enteric coated 81 milliGRAM(s) Oral daily  atorvastatin 80 milliGRAM(s) Oral at bedtime  clopidogrel Tablet 75 milliGRAM(s) Oral daily  dextrose 5%. 1000 milliLiter(s) (50 mL/Hr) IV Continuous <Continuous>  dextrose 50% Injectable 25 Gram(s) IV Push once  dextrose 50% Injectable 12.5 Gram(s) IV Push once  donepezil 5 milliGRAM(s) Oral <User Schedule>  enoxaparin Injectable 30 milliGRAM(s) SubCutaneous <User Schedule>  gabapentin 200 milliGRAM(s) Oral at bedtime  glucagon  Injectable 1 milliGRAM(s) IntraMuscular once  lisinopril 20 milliGRAM(s) Oral daily  nystatin    Suspension 229560 Unit(s) Oral four times a day  potassium chloride   Powder 20 milliEquivalent(s) Oral daily  senna 2 Tablet(s) Oral at bedtime  sodium chloride 0.65% Nasal 1 Spray(s) Both Nostrils three times a day    MEDICATIONS  (PRN):  acetaminophen     Tablet .. 650 milliGRAM(s) Oral every 6 hours PRN Temp greater or equal to 38C (100.4F), Mild Pain (1 - 3)  aluminum hydroxide/magnesium hydroxide/simethicone Suspension 30 milliLiter(s) Oral every 4 hours PRN Dyspepsia  melatonin 3 milliGRAM(s) Oral at bedtime PRN Insomnia  ondansetron   Disintegrating Tablet 4 milliGRAM(s) Oral every 8 hours PRN Nausea and/or Vomiting  polyethylene glycol 3350 17 Gram(s) Oral daily PRN Constipation    Vital Signs Last 24 Hrs  T(F): 97.8 (09 Mar 2024 08:35), Max: 97.8 (08 Mar 2024 21:00)  HR: 74 (09 Mar 2024 08:35) (72 - 82)  BP: 144/77 (09 Mar 2024 08:35) (136/76 - 156/80)  RR: 16 (09 Mar 2024 08:35) (16 - 16)  SpO2: 98% (09 Mar 2024 08:35) (97% - 98%)  I&O's Summary    PHYSICAL EXAM:  General: NAD, Aphasic . answering questions  ENT: MMM  Neck: Supple, No JVD  Lungs: Clear to auscultation bilaterally  Cardio: RRR, S1/S2, No murmurs  Abdomen: Soft, Nontender, Nondistended; Bowel sounds present  Extremities: No calf tenderness, No pitting edema    LABS:                        11.6   9.50  )-----------( 243      ( 07 Mar 2024 07:25 )             35.8     03-07    140  |  105  |  14  ----------------------------<  146  3.6   |  27  |  0.63    Ca    9.1      07 Mar 2024 07:25  Mg     1.9     03-07    TPro  6.7  /  Alb  3.4  /  TBili  0.8  /  DBili  x   /  AST  25  /  ALT  30  /  AlkPhos  43  03-07      03-02 Chol 165 mg/dL LDL -- HDL 54 mg/dL Trig 92 mg/dL        Urinalysis Basic - ( 07 Mar 2024 07:25 )    Color: x / Appearance: x / SG: x / pH: x  Gluc: 146 mg/dL / Ketone: x  / Bili: x / Urobili: x   Blood: x / Protein: x / Nitrite: x   Leuk Esterase: x / RBC: x / WBC x   Sq Epi: x / Non Sq Epi: x / Bacteria: x            RADIOLOGY & ADDITIONAL TESTS:    Care Discussed with Consultants/Other Providers:

## 2024-03-09 NOTE — PROGRESS NOTE ADULT - SUBJECTIVE AND OBJECTIVE BOX
Cc: L MCA CVA with expressive aphasia, right hemiparesis, and right facial droop    HPI: Patient with no new medical issues today.  Pain controlled, no chest pain, no N/V, no Fevers/Chills. No other new ROS  Has been tolerating rehabilitation program.    acetaminophen     Tablet .. 650 milliGRAM(s) Oral every 6 hours PRN  aluminum hydroxide/magnesium hydroxide/simethicone Suspension 30 milliLiter(s) Oral every 4 hours PRN  amLODIPine   Tablet 10 milliGRAM(s) Oral daily  AQUAPHOR (petrolatum Ointment) 1 Application(s) Topical three times a day  aspirin enteric coated 81 milliGRAM(s) Oral daily  atorvastatin 80 milliGRAM(s) Oral at bedtime  clopidogrel Tablet 75 milliGRAM(s) Oral daily  dextrose 5%. 1000 milliLiter(s) IV Continuous <Continuous>  dextrose 50% Injectable 25 Gram(s) IV Push once  dextrose 50% Injectable 12.5 Gram(s) IV Push once  donepezil 5 milliGRAM(s) Oral <User Schedule>  enoxaparin Injectable 30 milliGRAM(s) SubCutaneous <User Schedule>  gabapentin 200 milliGRAM(s) Oral at bedtime  glucagon  Injectable 1 milliGRAM(s) IntraMuscular once  lisinopril 20 milliGRAM(s) Oral daily  melatonin 3 milliGRAM(s) Oral at bedtime PRN  nystatin    Suspension 944023 Unit(s) Oral four times a day  ondansetron   Disintegrating Tablet 4 milliGRAM(s) Oral every 8 hours PRN  polyethylene glycol 3350 17 Gram(s) Oral daily PRN  potassium chloride   Powder 20 milliEquivalent(s) Oral daily  senna 2 Tablet(s) Oral at bedtime  sodium chloride 0.65% Nasal 1 Spray(s) Both Nostrils three times a day      T(C): 36.6 (03-09-24 @ 08:35), Max: 36.6 (03-08-24 @ 09:53)  HR: 74 (03-09-24 @ 08:35) (67 - 82)  BP: 144/77 (03-09-24 @ 08:35) (129/77 - 156/80)  RR: 16 (03-09-24 @ 08:35) (16 - 16)  SpO2: 98% (03-09-24 @ 08:35) (97% - 99%)    In NAD  HEENT- EOMI  Heart- Well Perfused  Lungs- No resp distress, no use of accessory resp muscles  Abd- + BS, NT  Ext- No calf pain  Neuro- Exam unchanged  Psych- Affect wnl          Imp: Patient with diagnosis of     L MCA CVA with expressive aphasia, right hemiparesis, and right facial droop     admitted for comprehensive acute rehabilitation.    Plan:  - Continue therapies  - DVT prophylaxis  - Skin- Turn q2h, check skin daily  - Continue current medications; patient medically stable.   - Patient is stable to continue current rehabilitation program.

## 2024-03-09 NOTE — PROGRESS NOTE ADULT - ASSESSMENT
From primary team notes:  64 YO female with PMH HTN, HLD, DM2, CVA (10/2023), who presented to [facility] ED on 3/1/24 with right sided facial, right arm and leg weakness, aphasia and episodes of confusion as well as a fall. MRI brain demonstrated multiple small acute infarctions in the left MCA distribution     #Left MCA CVA with expressive aphasia, right hemiparesis, and right facial droop  - MRI 3/1: Multiple foci of diffusion restriction scattered throughout the left MCA vascular distribution, compatible with acute infarctions. Chronic lacunar infarctions in the right centrum semiovale/corona radiata and left hemipons. Encephalomalacia/gliotic change in the anterior right temporal lobe. Foci of susceptibility artifact in the right basal ganglia, likely sequela of prior micro-hemorrhages.  - ASA & plavix for 3 months, Discontinue aspirin after 3 months  - High dose statin  - Start Aricept 5 mg Q8AM on 3/9 for aphasia and cognition. Reviewed indications with partner and SE, who is agreeable  - Continue Comprehensive Rehab Program: PT/OT/ST, 3hours daily and 5 days weekly  - right elbow comfy splint to improve extension, right resting hand splint  - recreation therapy  - Precautions: fall, aspiration, cardiac, DM, AMS    # dysphonia  - ENT consult appreicated 3/7:  eval VC,  difficulty tolerating laryngoscopy through nose, (+)dried nasal crusting, tolerated laryngoscopy through mouth, vocal cords mobile bilaterally, no pooling, no lesions  - Nasal saline spray TID    # HTN  - Amlodipine 10 mg daily (increased on 3/7)  - Lisinopril 20mg daily  - BP (129/77 - 149/76) 3/8     # HLD  - Lipitor 80mg daily    # Diabetes Mellitus Type 2 without complications  - A1c 5.5 on 3/2/24  - Hospitalist appreciated  - FS and ISS discontinued on 3/6    # hypokalemia  - K+ 3.3 3/5. Replete 40 meq x 1--> 3.6 3/7  - KCl 20 meq daily started 3/7, home medication dose as confirmed with partner  - BMP 3/11    # hypomagnesemia  - Mg oxide supplemented   - Mg++ 1.5--> 1.9 3/7    # Pain management  - Tylenol PRN  - gabapentin 200 mg QHS 3/4   - controlled    # Bowel Regimen  - Senna  - miralax PRN    # Sleep:   - Melatonin 3mg nightly PRN    # FEN   - Diet: upgraded to minced/moist and thin liquids 3/5    # DVT ppx  # report of right Calf pain  - Bilateral LE duplex (3/8) ordered, discussed with partner  - Lovenox    # Case discussed in IDT rounds 3/6 (initial):  - mod-severe mixed expressive and receptive aphasia, +right shelby inattention, +aphasic, max-total assist toilet transfers and max assist UB dressing and bathing, total LB dressing and toileting. Min assist bed mobility, mod assist transfers, ambulates 10 feet with parallel bars with mod-max assist +occ impulsive  - goals: supervision waking hours due to cog-language deficits, CG transfers, CG/min assist ambulation and stairs, CG bADLs  - target: dc home 3/23 with caregiver support and home care referral PT OT SLP  - caregiver training    # LABS  BLE doppler  CBC CMP Mg++ 3/11    #GOC  CODE STATUS: FULL CODE    addendum 5:37 PM  - IMPRESSION:  No evidence of deep venous thrombosis in either lower extremity.    Family informed by team    Outpatient Follow-up (Specialty/Name of physician):    Jacobo Miramontes  Physical/Rehab Medicine  [address] [place] [id number]  Phone: [phone]  Fax: [phone]  Follow Up Time:

## 2024-03-09 NOTE — PROGRESS NOTE ADULT - ASSESSMENT
64 YO female with PMH HTN, HLD, DM2, CVA (10/2023), who presented to [facility] ED on 3/1/24 with right sided facial, right arm and leg weakness, aphasia and episodes of confusion as well as a fall. MRI brain demonstrated multiple small acute infarctions in the left MCA distribution  Patient now admitted with gait Instability, ADL impairments and Functional impairments.    #Left MCA CVA with expressive aphasia, right hemiparesis, and right facial droop  - ASA & plavix for 3 months, discontinue aspirin after 3 months  - Lipitor 80mg qhs    # HTN  - Amlodipine increased to 10mg due to uncontrolled bp march 6  - Lisinopril 20mg daily  - BP better controlled now    # HLD  - Lipitor 80mg daily    # Diabetes Mellitus Type 2 without complications  - A1c 5.5 on 3/2/24  - ISS  - DC Accu-Cheks since FS well controlled    DVT ppx- Lovenox. venous doppler 3/8 reviewed. negative DVT

## 2024-03-10 LAB
APPEARANCE UR: CLEAR — SIGNIFICANT CHANGE UP
BACTERIA # UR AUTO: NEGATIVE /HPF — SIGNIFICANT CHANGE UP
BILIRUB UR-MCNC: NEGATIVE — SIGNIFICANT CHANGE UP
COLOR SPEC: SIGNIFICANT CHANGE UP
DIFF PNL FLD: NEGATIVE — SIGNIFICANT CHANGE UP
EPI CELLS # UR: 0 — SIGNIFICANT CHANGE UP
GLUCOSE UR QL: NEGATIVE MG/DL — SIGNIFICANT CHANGE UP
KETONES UR-MCNC: ABNORMAL MG/DL
LEUKOCYTE ESTERASE UR-ACNC: ABNORMAL
NITRITE UR-MCNC: NEGATIVE — SIGNIFICANT CHANGE UP
PH UR: 5.5 — SIGNIFICANT CHANGE UP (ref 5–8)
PROT UR-MCNC: SIGNIFICANT CHANGE UP MG/DL
RBC CASTS # UR COMP ASSIST: 0 /HPF — SIGNIFICANT CHANGE UP (ref 0–4)
SP GR SPEC: 1.03 — SIGNIFICANT CHANGE UP (ref 1–1.03)
UROBILINOGEN FLD QL: 1 MG/DL — SIGNIFICANT CHANGE UP (ref 0.2–1)
WBC UR QL: 0 /HPF — SIGNIFICANT CHANGE UP (ref 0–5)

## 2024-03-10 PROCEDURE — 99232 SBSQ HOSP IP/OBS MODERATE 35: CPT

## 2024-03-10 RX ORDER — PHENAZOPYRIDINE HCL 100 MG
100 TABLET ORAL EVERY 8 HOURS
Refills: 0 | Status: COMPLETED | OUTPATIENT
Start: 2024-03-10 | End: 2024-03-13

## 2024-03-10 RX ADMIN — Medication 1 SPRAY(S): at 11:27

## 2024-03-10 RX ADMIN — Medication 1 APPLICATION(S): at 05:24

## 2024-03-10 RX ADMIN — LISINOPRIL 20 MILLIGRAM(S): 2.5 TABLET ORAL at 05:18

## 2024-03-10 RX ADMIN — Medication 100 MILLIGRAM(S): at 20:19

## 2024-03-10 RX ADMIN — Medication 100 MILLIGRAM(S): at 18:04

## 2024-03-10 RX ADMIN — Medication 20 MILLIEQUIVALENT(S): at 11:26

## 2024-03-10 RX ADMIN — Medication 1 SPRAY(S): at 05:18

## 2024-03-10 RX ADMIN — Medication 1 SPRAY(S): at 20:18

## 2024-03-10 RX ADMIN — Medication 1 APPLICATION(S): at 20:19

## 2024-03-10 RX ADMIN — Medication 3 MILLIGRAM(S): at 20:19

## 2024-03-10 RX ADMIN — ATORVASTATIN CALCIUM 80 MILLIGRAM(S): 80 TABLET, FILM COATED ORAL at 20:19

## 2024-03-10 RX ADMIN — CLOPIDOGREL BISULFATE 75 MILLIGRAM(S): 75 TABLET, FILM COATED ORAL at 11:27

## 2024-03-10 RX ADMIN — Medication 500000 UNIT(S): at 17:21

## 2024-03-10 RX ADMIN — Medication 500000 UNIT(S): at 05:18

## 2024-03-10 RX ADMIN — DONEPEZIL HYDROCHLORIDE 5 MILLIGRAM(S): 10 TABLET, FILM COATED ORAL at 07:37

## 2024-03-10 RX ADMIN — ENOXAPARIN SODIUM 30 MILLIGRAM(S): 100 INJECTION SUBCUTANEOUS at 20:18

## 2024-03-10 RX ADMIN — GABAPENTIN 200 MILLIGRAM(S): 400 CAPSULE ORAL at 20:19

## 2024-03-10 RX ADMIN — AMLODIPINE BESYLATE 10 MILLIGRAM(S): 2.5 TABLET ORAL at 05:18

## 2024-03-10 RX ADMIN — SENNA PLUS 2 TABLET(S): 8.6 TABLET ORAL at 20:18

## 2024-03-10 RX ADMIN — Medication 500000 UNIT(S): at 11:27

## 2024-03-10 RX ADMIN — Medication 81 MILLIGRAM(S): at 11:26

## 2024-03-10 NOTE — PROGRESS NOTE ADULT - ASSESSMENT
From primary team notes:  64 YO female with PMH HTN, HLD, DM2, CVA (10/2023), who presented to [facility] ED on 3/1/24 with right sided facial, right arm and leg weakness, aphasia and episodes of confusion as well as a fall. MRI brain demonstrated multiple small acute infarctions in the left MCA distribution     #Left MCA CVA with expressive aphasia, right hemiparesis, and right facial droop  - MRI 3/1: Multiple foci of diffusion restriction scattered throughout the left MCA vascular distribution, compatible with acute infarctions. Chronic lacunar infarctions in the right centrum semiovale/corona radiata and left hemipons. Encephalomalacia/gliotic change in the anterior right temporal lobe. Foci of susceptibility artifact in the right basal ganglia, likely sequela of prior micro-hemorrhages.  - ASA & plavix for 3 months, Discontinue aspirin after 3 months  - High dose statin  - Start Aricept 5 mg Q8AM on 3/9 for aphasia and cognition. Reviewed indications with partner and SE, who is agreeable  - Continue Comprehensive Rehab Program: PT/OT/ST, 3hours daily and 5 days weekly  - right elbow comfy splint to improve extension, right resting hand splint  - recreation therapy  - Precautions: fall, aspiration, cardiac, DM, AMS    # dysphonia  - ENT consult appreicated 3/7:  eval VC,  difficulty tolerating laryngoscopy through nose, (+)dried nasal crusting, tolerated laryngoscopy through mouth, vocal cords mobile bilaterally, no pooling, no lesions  - Nasal saline spray TID    # HTN  - Amlodipine 10 mg daily (increased on 3/7)  - Lisinopril 20mg daily  - BP (129/77 - 149/76) 3/8     # HLD  - Lipitor 80mg daily    # Diabetes Mellitus Type 2 without complications  - A1c 5.5 on 3/2/24  - Hospitalist appreciated  - FS and ISS discontinued on 3/6    # hypokalemia  - K+ 3.3 3/5. Replete 40 meq x 1--> 3.6 3/7  - KCl 20 meq daily started 3/7, home medication dose as confirmed with partner  - BMP 3/11    # hypomagnesemia  - Mg oxide supplemented   - Mg++ 1.5--> 1.9 3/7    # Pain management  - Tylenol PRN  - gabapentin 200 mg QHS 3/4   - controlled    # Bowel Regimen  - Senna  - miralax PRN    # Sleep:   - Melatonin 3mg nightly PRN    # FEN   - Diet: upgraded to minced/moist and thin liquids 3/5    # DVT ppx  # report of right Calf pain  - Bilateral LE duplex (3/8) ordered, discussed with partner  - Lovenox    # Case discussed in IDT rounds 3/6 (initial):  - mod-severe mixed expressive and receptive aphasia, +right shelby inattention, +aphasic, max-total assist toilet transfers and max assist UB dressing and bathing, total LB dressing and toileting. Min assist bed mobility, mod assist transfers, ambulates 10 feet with parallel bars with mod-max assist +occ impulsive  - goals: supervision waking hours due to cog-language deficits, CG transfers, CG/min assist ambulation and stairs, CG bADLs  - target: dc home 3/23 with caregiver support and home care referral PT OT SLP  - caregiver training    # LABS  BLE doppler  CBC CMP Mg++ 3/11    #GOC  CODE STATUS: FULL CODE    addendum 5:37 PM  - IMPRESSION:  No evidence of deep venous thrombosis in either lower extremity.    Family informed by team    Outpatient Follow-up (Specialty/Name of physician):    Jacobo Miramontes  Physical/Rehab Medicine  [address] [place] [id number]  Phone: [phone]  Fax: [phone]  Follow Up Time:

## 2024-03-10 NOTE — PROGRESS NOTE ADULT - SUBJECTIVE AND OBJECTIVE BOX
Cc: L MCA CVA with expressive aphasia, right hemiparesis, and right facial droop    HPI: Patient with no new medical issues today.  Pain controlled, no chest pain, no N/V, no Fevers/Chills. No other new ROS  Has been tolerating rehabilitation program.    acetaminophen     Tablet .. 650 milliGRAM(s) Oral every 6 hours PRN  aluminum hydroxide/magnesium hydroxide/simethicone Suspension 30 milliLiter(s) Oral every 4 hours PRN  amLODIPine   Tablet 10 milliGRAM(s) Oral daily  AQUAPHOR (petrolatum Ointment) 1 Application(s) Topical three times a day  aspirin enteric coated 81 milliGRAM(s) Oral daily  atorvastatin 80 milliGRAM(s) Oral at bedtime  clopidogrel Tablet 75 milliGRAM(s) Oral daily  dextrose 5%. 1000 milliLiter(s) IV Continuous <Continuous>  dextrose 50% Injectable 25 Gram(s) IV Push once  dextrose 50% Injectable 12.5 Gram(s) IV Push once  donepezil 5 milliGRAM(s) Oral <User Schedule>  enoxaparin Injectable 30 milliGRAM(s) SubCutaneous <User Schedule>  gabapentin 200 milliGRAM(s) Oral at bedtime  glucagon  Injectable 1 milliGRAM(s) IntraMuscular once  lisinopril 20 milliGRAM(s) Oral daily  melatonin 3 milliGRAM(s) Oral at bedtime PRN  nystatin    Suspension 264313 Unit(s) Oral four times a day  ondansetron   Disintegrating Tablet 4 milliGRAM(s) Oral every 8 hours PRN  polyethylene glycol 3350 17 Gram(s) Oral daily PRN  potassium chloride   Powder 20 milliEquivalent(s) Oral daily  senna 2 Tablet(s) Oral at bedtime  sodium chloride 0.65% Nasal 1 Spray(s) Both Nostrils three times a day    ICU Vital Signs Last 24 Hrs  T(C): 36.6 (10 Mar 2024 07:39), Max: 36.6 (10 Mar 2024 07:39)  T(F): 97.8 (10 Mar 2024 07:39), Max: 97.8 (10 Mar 2024 07:39)  HR: 81 (10 Mar 2024 07:39) (75 - 90)  BP: 129/76 (10 Mar 2024 07:39) (128/76 - 134/82)  RR: 16 (10 Mar 2024 07:39) (16 - 16)  SpO2: 97% (10 Mar 2024 07:39) (97% - 99%)    O2 Parameters below as of 10 Mar 2024 07:39  Patient On (Oxygen Delivery Method): room air            In NAD  HEENT- EOMI  Heart- Well Perfused  Lungs- No resp distress, no use of accessory resp muscles  Neuro- Exam unchanged  Psych- Affect wnl          Imp: Patient with diagnosis of     L MCA CVA with expressive aphasia, right hemiparesis, and right facial droop     admitted for comprehensive acute rehabilitation.    Plan:  - Continue therapies  - DVT prophylaxis  - Skin- Turn q2h, check skin daily  - Continue current medications; patient medically stable.   - Patient is stable to continue current rehabilitation program.

## 2024-03-11 LAB
ALBUMIN SERPL ELPH-MCNC: 3.9 G/DL — SIGNIFICANT CHANGE UP (ref 3.3–5)
ALP SERPL-CCNC: 64 U/L — SIGNIFICANT CHANGE UP (ref 40–120)
ALT FLD-CCNC: 36 U/L — SIGNIFICANT CHANGE UP (ref 10–45)
ANION GAP SERPL CALC-SCNC: 8 MMOL/L — SIGNIFICANT CHANGE UP (ref 5–17)
AST SERPL-CCNC: 25 U/L — SIGNIFICANT CHANGE UP (ref 10–40)
BASOPHILS # BLD AUTO: 0.05 K/UL — SIGNIFICANT CHANGE UP (ref 0–0.2)
BASOPHILS NFR BLD AUTO: 0.4 % — SIGNIFICANT CHANGE UP (ref 0–2)
BILIRUB SERPL-MCNC: 0.7 MG/DL — SIGNIFICANT CHANGE UP (ref 0.2–1.2)
BUN SERPL-MCNC: 19 MG/DL — SIGNIFICANT CHANGE UP (ref 7–23)
CALCIUM SERPL-MCNC: 9.8 MG/DL — SIGNIFICANT CHANGE UP (ref 8.4–10.5)
CHLORIDE SERPL-SCNC: 101 MMOL/L — SIGNIFICANT CHANGE UP (ref 96–108)
CO2 SERPL-SCNC: 30 MMOL/L — SIGNIFICANT CHANGE UP (ref 22–31)
CREAT SERPL-MCNC: 0.77 MG/DL — SIGNIFICANT CHANGE UP (ref 0.5–1.3)
EGFR: 86 ML/MIN/1.73M2 — SIGNIFICANT CHANGE UP
EOSINOPHIL # BLD AUTO: 0.1 K/UL — SIGNIFICANT CHANGE UP (ref 0–0.5)
EOSINOPHIL NFR BLD AUTO: 0.7 % — SIGNIFICANT CHANGE UP (ref 0–6)
GLUCOSE SERPL-MCNC: 192 MG/DL — HIGH (ref 70–99)
HCT VFR BLD CALC: 39.4 % — SIGNIFICANT CHANGE UP (ref 34.5–45)
HGB BLD-MCNC: 12.5 G/DL — SIGNIFICANT CHANGE UP (ref 11.5–15.5)
IMM GRANULOCYTES NFR BLD AUTO: 0.3 % — SIGNIFICANT CHANGE UP (ref 0–0.9)
LYMPHOCYTES # BLD AUTO: 26.7 % — SIGNIFICANT CHANGE UP (ref 13–44)
LYMPHOCYTES # BLD AUTO: 3.61 K/UL — HIGH (ref 1–3.3)
MAGNESIUM SERPL-MCNC: 2 MG/DL — SIGNIFICANT CHANGE UP (ref 1.6–2.6)
MCHC RBC-ENTMCNC: 28.5 PG — SIGNIFICANT CHANGE UP (ref 27–34)
MCHC RBC-ENTMCNC: 31.7 GM/DL — LOW (ref 32–36)
MCV RBC AUTO: 90 FL — SIGNIFICANT CHANGE UP (ref 80–100)
MONOCYTES # BLD AUTO: 0.77 K/UL — SIGNIFICANT CHANGE UP (ref 0–0.9)
MONOCYTES NFR BLD AUTO: 5.7 % — SIGNIFICANT CHANGE UP (ref 2–14)
NEUTROPHILS # BLD AUTO: 8.93 K/UL — HIGH (ref 1.8–7.4)
NEUTROPHILS NFR BLD AUTO: 66.2 % — SIGNIFICANT CHANGE UP (ref 43–77)
NRBC # BLD: 0 /100 WBCS — SIGNIFICANT CHANGE UP (ref 0–0)
PLATELET # BLD AUTO: 286 K/UL — SIGNIFICANT CHANGE UP (ref 150–400)
POTASSIUM SERPL-MCNC: 4.2 MMOL/L — SIGNIFICANT CHANGE UP (ref 3.5–5.3)
POTASSIUM SERPL-SCNC: 4.2 MMOL/L — SIGNIFICANT CHANGE UP (ref 3.5–5.3)
PROT SERPL-MCNC: 7.5 G/DL — SIGNIFICANT CHANGE UP (ref 6–8.3)
RBC # BLD: 4.38 M/UL — SIGNIFICANT CHANGE UP (ref 3.8–5.2)
RBC # FLD: 12 % — SIGNIFICANT CHANGE UP (ref 10.3–14.5)
SODIUM SERPL-SCNC: 139 MMOL/L — SIGNIFICANT CHANGE UP (ref 135–145)
WBC # BLD: 13.5 K/UL — HIGH (ref 3.8–10.5)
WBC # FLD AUTO: 13.5 K/UL — HIGH (ref 3.8–10.5)

## 2024-03-11 PROCEDURE — 99232 SBSQ HOSP IP/OBS MODERATE 35: CPT

## 2024-03-11 RX ADMIN — Medication 81 MILLIGRAM(S): at 13:43

## 2024-03-11 RX ADMIN — Medication 1 APPLICATION(S): at 05:35

## 2024-03-11 RX ADMIN — Medication 1 SPRAY(S): at 05:28

## 2024-03-11 RX ADMIN — Medication 500000 UNIT(S): at 00:30

## 2024-03-11 RX ADMIN — AMLODIPINE BESYLATE 10 MILLIGRAM(S): 2.5 TABLET ORAL at 05:28

## 2024-03-11 RX ADMIN — Medication 500000 UNIT(S): at 13:44

## 2024-03-11 RX ADMIN — Medication 3 MILLIGRAM(S): at 21:27

## 2024-03-11 RX ADMIN — CLOPIDOGREL BISULFATE 75 MILLIGRAM(S): 75 TABLET, FILM COATED ORAL at 13:43

## 2024-03-11 RX ADMIN — ENOXAPARIN SODIUM 30 MILLIGRAM(S): 100 INJECTION SUBCUTANEOUS at 20:59

## 2024-03-11 RX ADMIN — Medication 100 MILLIGRAM(S): at 13:42

## 2024-03-11 RX ADMIN — Medication 100 MILLIGRAM(S): at 21:23

## 2024-03-11 RX ADMIN — Medication 500000 UNIT(S): at 21:24

## 2024-03-11 RX ADMIN — GABAPENTIN 200 MILLIGRAM(S): 400 CAPSULE ORAL at 21:21

## 2024-03-11 RX ADMIN — Medication 1 SPRAY(S): at 21:22

## 2024-03-11 RX ADMIN — LISINOPRIL 20 MILLIGRAM(S): 2.5 TABLET ORAL at 05:28

## 2024-03-11 RX ADMIN — Medication 500000 UNIT(S): at 05:28

## 2024-03-11 RX ADMIN — Medication 1 APPLICATION(S): at 13:45

## 2024-03-11 RX ADMIN — Medication 500000 UNIT(S): at 17:52

## 2024-03-11 RX ADMIN — Medication 100 MILLIGRAM(S): at 05:28

## 2024-03-11 RX ADMIN — Medication 1 SPRAY(S): at 13:44

## 2024-03-11 RX ADMIN — Medication 1 APPLICATION(S): at 21:23

## 2024-03-11 RX ADMIN — SENNA PLUS 2 TABLET(S): 8.6 TABLET ORAL at 21:23

## 2024-03-11 RX ADMIN — Medication 20 MILLIEQUIVALENT(S): at 12:31

## 2024-03-11 RX ADMIN — ATORVASTATIN CALCIUM 80 MILLIGRAM(S): 80 TABLET, FILM COATED ORAL at 21:22

## 2024-03-11 RX ADMIN — DONEPEZIL HYDROCHLORIDE 5 MILLIGRAM(S): 10 TABLET, FILM COATED ORAL at 07:38

## 2024-03-11 NOTE — PROGRESS NOTE ADULT - COMMENTS
Patient seen with partner Connie. Continues to have significant mixed aphasia, however her yes/no for simple personal questions seems more accurate today, relying on both spoken "yes" and shakes of head for no. Per partner, patient had dysuria yesterday; UA appeared concentrated but negative. Has elevated WBC today but no fever; denies dysuria, no N/V, no bowel change, no congestion/cough/SOB or chills or fever. Will monitor and repeat in AM, discussed with team and patient.partner

## 2024-03-11 NOTE — PROGRESS NOTE ADULT - ASSESSMENT
63F with HTN, HLD, DM2, prior CVA, now in acute rehab after recently dx left MCA stroke    #Left MCA stroke  #Aphasic due to above  -c/w ASA and plavix  -c/w statin  -PT/OT/SLP  -c/w Aricept    #Essential HTN  -stable on current regimen    #DM2  A1C with Estimated Average Glucose Result: 5.5 % (03-02-24 @ 06:25)  Stable, no longer needing FS    #Leukocytosis   -Non-specific, could be "hemoconcentration"  -thrush noted - on nystatin  -U/A negative for UTI  -LE dopplers neg for DVT (b/l) on 3/8  -Will repeat in AM, PO hydration encouraged    #Oral thrush  -c/w Nystatin    #DVT ppx: Lovenox

## 2024-03-11 NOTE — PROGRESS NOTE ADULT - SUBJECTIVE AND OBJECTIVE BOX
Patient is a 63y old  Female who presents with a chief complaint of L MCA CVA with expressive aphasia, right hemiparesis, and right facial droop (10 Mar 2024 08:50)      HPI:  This ia a 62 YO female RH dominant with PMH HTN, HLD, DM2, CVA (10/2023), who presented to  [facility] ED on 3/1/24  following a fall at home. According to the partner, Connie, the patient was in her usual state of health up until 5 days prior to admission when she was noted to have right sided facial, arm and leg weakness, aphasia and episodes of confusion. She took ASA 81mg with improvement of symptoms; pt was able to ambulate and function at home but felt weak. On the day prior to admission, the patient fell prompting her partner to bring her to the ED.    On arrival, pt noted to have dense right sided facial droop and right sided hemiparesis and aphasia.  MRI brain demonstrated multiple small acute infarctions in the left MCA distribution and chronic ischemic changes from prior strokes. CTA head/neck: negative for LVO occlusion. Patient was evaluated by neurology, and recommended for ASA & plavix x 3 months.    Patient was evaluated by PM&R and therapy for functional deficits, gait/ADL impairments and acute rehabilitation was recommended. Patient was discharged to  [facility] on 3/4/24. (04 Mar 2024 13:15)      PAST MEDICAL & SURGICAL HISTORY:  HTN (hypertension)      DM (diabetes mellitus)      CVA (cerebrovascular accident)      No significant past surgical history          MEDICATIONS  (STANDING):  amLODIPine   Tablet 10 milliGRAM(s) Oral daily  AQUAPHOR (petrolatum Ointment) 1 Application(s) Topical three times a day  aspirin enteric coated 81 milliGRAM(s) Oral daily  atorvastatin 80 milliGRAM(s) Oral at bedtime  clopidogrel Tablet 75 milliGRAM(s) Oral daily  dextrose 5%. 1000 milliLiter(s) (50 mL/Hr) IV Continuous <Continuous>  dextrose 50% Injectable 12.5 Gram(s) IV Push once  dextrose 50% Injectable 25 Gram(s) IV Push once  donepezil 5 milliGRAM(s) Oral <User Schedule>  enoxaparin Injectable 30 milliGRAM(s) SubCutaneous <User Schedule>  gabapentin 200 milliGRAM(s) Oral at bedtime  glucagon  Injectable 1 milliGRAM(s) IntraMuscular once  lisinopril 20 milliGRAM(s) Oral daily  nystatin    Suspension 782452 Unit(s) Oral four times a day  phenazopyridine 100 milliGRAM(s) Oral every 8 hours  potassium chloride   Powder 20 milliEquivalent(s) Oral daily  senna 2 Tablet(s) Oral at bedtime  sodium chloride 0.65% Nasal 1 Spray(s) Both Nostrils three times a day    MEDICATIONS  (PRN):  acetaminophen     Tablet .. 650 milliGRAM(s) Oral every 6 hours PRN Temp greater or equal to 38C (100.4F), Mild Pain (1 - 3)  aluminum hydroxide/magnesium hydroxide/simethicone Suspension 30 milliLiter(s) Oral every 4 hours PRN Dyspepsia  melatonin 3 milliGRAM(s) Oral at bedtime PRN Insomnia  ondansetron   Disintegrating Tablet 4 milliGRAM(s) Oral every 8 hours PRN Nausea and/or Vomiting  polyethylene glycol 3350 17 Gram(s) Oral daily PRN Constipation      Allergies    Allergy Status Unknown    Intolerances          VITALS  63y  Vital Signs Last 24 Hrs  T(C): 36.7 (11 Mar 2024 07:44), Max: 36.7 (11 Mar 2024 07:44)  T(F): 98 (11 Mar 2024 07:44), Max: 98 (11 Mar 2024 07:44)  HR: 82 (11 Mar 2024 07:44) (71 - 82)  BP: 132/82 (11 Mar 2024 07:44) (132/82 - 148/80)  BP(mean): --  RR: 16 (11 Mar 2024 07:44) (16 - 16)  SpO2: 98% (11 Mar 2024 07:44) (98% - 98%)    Parameters below as of 11 Mar 2024 07:44  Patient On (Oxygen Delivery Method): room air      Daily     Daily         RECENT LABS:                          12.5   13.50 )-----------( 286      ( 11 Mar 2024 06:20 )             39.4     03-11    139  |  101  |  19  ----------------------------<  192<H>  4.2   |  30  |  0.77    Ca    9.8      11 Mar 2024 06:20  Mg     2.0     03-11    TPro  7.5  /  Alb  3.9  /  TBili  0.7  /  DBili  x   /  AST  25  /  ALT  36  /  AlkPhos  64  03-11    LIVER FUNCTIONS - ( 11 Mar 2024 06:20 )  Alb: 3.9 g/dL / Pro: 7.5 g/dL / ALK PHOS: 64 U/L / ALT: 36 U/L / AST: 25 U/L / GGT: x             Urinalysis Basic - ( 11 Mar 2024 06:20 )    Color: x / Appearance: x / SG: x / pH: x  Gluc: 192 mg/dL / Ketone: x  / Bili: x / Urobili: x   Blood: x / Protein: x / Nitrite: x   Leuk Esterase: x / RBC: x / WBC x   Sq Epi: x / Non Sq Epi: x / Bacteria: x          CAPILLARY BLOOD GLUCOSE

## 2024-03-11 NOTE — CHART NOTE - NSCHARTNOTEFT_GEN_A_CORE
Nutrition Follow Up Note  Source: Medical Record [X] Patient [X] Family [X] pt's partner provided info         Diet: Minced and moist, Glucerna BID  Pt tolerating diet with fair-good PO intake, eating %  of meals Per nursing flowsheets. Pt reports feeling hungry. Discussed double protein @ meals, pt receptive to suggestion. Reviewed food preferences to optimize PO intake. Pt reports good acceptance of Glucerna supplement (provides 220 kcal 10 g protein/serving). Denies nausea, vomiting, diarrhea, constipation.     Enteral/Parenteral Nutrition: N/A    Current Weight: 87.3 lbs (3-4)    Pertinent Medications: MEDICATIONS  (STANDING):  amLODIPine   Tablet 10 milliGRAM(s) Oral daily  AQUAPHOR (petrolatum Ointment) 1 Application(s) Topical three times a day  aspirin enteric coated 81 milliGRAM(s) Oral daily  atorvastatin 80 milliGRAM(s) Oral at bedtime  clopidogrel Tablet 75 milliGRAM(s) Oral daily  dextrose 5%. 1000 milliLiter(s) (50 mL/Hr) IV Continuous <Continuous>  dextrose 50% Injectable 12.5 Gram(s) IV Push once  dextrose 50% Injectable 25 Gram(s) IV Push once  donepezil 5 milliGRAM(s) Oral <User Schedule>  enoxaparin Injectable 30 milliGRAM(s) SubCutaneous <User Schedule>  gabapentin 200 milliGRAM(s) Oral at bedtime  glucagon  Injectable 1 milliGRAM(s) IntraMuscular once  lisinopril 20 milliGRAM(s) Oral daily  nystatin    Suspension 534240 Unit(s) Oral four times a day  phenazopyridine 100 milliGRAM(s) Oral every 8 hours  potassium chloride   Powder 20 milliEquivalent(s) Oral daily  senna 2 Tablet(s) Oral at bedtime  sodium chloride 0.65% Nasal 1 Spray(s) Both Nostrils three times a day    MEDICATIONS  (PRN):  acetaminophen     Tablet .. 650 milliGRAM(s) Oral every 6 hours PRN Temp greater or equal to 38C (100.4F), Mild Pain (1 - 3)  aluminum hydroxide/magnesium hydroxide/simethicone Suspension 30 milliLiter(s) Oral every 4 hours PRN Dyspepsia  melatonin 3 milliGRAM(s) Oral at bedtime PRN Insomnia  ondansetron   Disintegrating Tablet 4 milliGRAM(s) Oral every 8 hours PRN Nausea and/or Vomiting  polyethylene glycol 3350 17 Gram(s) Oral daily PRN Constipation      Pertinent Labs:  03-11 Na139 mmol/L Glu 192 mg/dL<H> K+ 4.2 mmol/L Cr  0.77 mg/dL BUN 19 mg/dL 03-11 Alb 3.9 g/dL 03-02 Chol 165 mg/dL LDL --    HDL 54 mg/dL Trig 92 mg/dL        Skin: no pressure injuries Per nursing flowsheets     Edema: No edema per nursing flow sheets     Last BM: on 3-10 Per nursing flowsheets     Estimated Needs:   [X] No Change since Previous Assessment  [ ] Recalculated:     Previous Nutrition Diagnosis:   1. Severe malnutrition  2. Swallowing difficulty    Nutrition Diagnosis is [X] Ongoing  - addressed with Glucerna BID, sLP following    New Nutrition Diagnosis: [X] Not Applicable  [ ] Inadequate Protein Energy Intake   [ ] Inadequate Oral Intake   [ ] Excessive Energy Intake   [ ] Increased Nutrient Needs   [ ] Obesity   [ ] Altered GI Function   [ ] Unintended Weight Loss   [ ] Food & Nutrition Related Knowledge Deficit  [ ] Limited Adherence to nutrition related recommendations   [ ] Malnutrition      Interventions:   1. Recommend continuing with current plan of care  2. 2x protein @ meals  3. Obtain and honor food preferences as able  4. Follow SLP recommendations    Monitoring & Evaluation:   [X] Weights   [X] PO Intake   [X] Follow Up (Per Protocol)  [X] Tolerance to Diet Prescription    RD Remains Available.  Callie Faith RD

## 2024-03-11 NOTE — PROGRESS NOTE ADULT - ASSESSMENT
From primary team notes:  64 YO female with PMH HTN, HLD, DM2, CVA (10/2023), who presented to [facility] ED on 3/1/24 with right sided facial, right arm and leg weakness, aphasia and episodes of confusion as well as a fall. MRI brain demonstrated multiple small acute infarctions in the left MCA distribution     #Left MCA CVA with expressive aphasia, right hemiparesis, and right facial droop  - MRI 3/1: Multiple foci of diffusion restriction scattered throughout the left MCA vascular distribution, compatible with acute infarctions. Chronic lacunar infarctions in the right centrum semiovale/corona radiata and left hemipons. Encephalomalacia/gliotic change in the anterior right temporal lobe. Foci of susceptibility artifact in the right basal ganglia, likely sequela of prior micro-hemorrhages.  - ASA & plavix for 3 months, Discontinue aspirin after 3 months  - High dose statin  - Start Aricept 5 mg Q8AM on 3/9 for aphasia and cognition. Reviewed indications with partner and SE, who is agreeable  - Continue Comprehensive Rehab Program: PT/OT/ST, 3hours daily and 5 days weekly  - right elbow comfy splint to improve extension, right resting hand splint  - recreation therapy  - Precautions: fall, aspiration, cardiac, DM, AMS    # dysphonia  - ENT consult appreicated 3/7:  eval VC,  difficulty tolerating laryngoscopy through nose, (+)dried nasal crusting, tolerated laryngoscopy through mouth, vocal cords mobile bilaterally, no pooling, no lesions  - Nasal saline spray TID    # HTN  - Amlodipine 10 mg daily (increased on 3/7)  - Lisinopril 20mg daily  - BP (129/77 - 149/76) 3/8     # HLD  - Lipitor 80mg daily    # Diabetes Mellitus Type 2 without complications  - A1c 5.5 on 3/2/24  - Hospitalist appreciated  - FS and ISS discontinued on 3/6    # hypokalemia  - K+ 3.3 3/5. Replete 40 meq x 1--> 3.6 3/7  - KCl 20 meq daily started 3/7, home medication dose as confirmed with partner  - BMP 3/11    # hypomagnesemia  - Mg oxide supplemented   - Mg++ 1.5--> 1.9 3/7    # Pain management  - Tylenol PRN  - gabapentin 200 mg QHS 3/4   - controlled    # Bowel Regimen  - Senna  - miralax PRN    # Sleep:   - Melatonin 3mg nightly PRN    # FEN   - Diet: upgraded to minced/moist and thin liquids 3/5    # DVT ppx  # report of right Calf pain  - Bilateral LE duplex (3/8) ordered, discussed with partner  - Lovenox    # Case discussed in IDT rounds 3/6 (initial):  - mod-severe mixed expressive and receptive aphasia, +right shelby inattention, +aphasic, max-total assist toilet transfers and max assist UB dressing and bathing, total LB dressing and toileting. Min assist bed mobility, mod assist transfers, ambulates 10 feet with parallel bars with mod-max assist +occ impulsive  - goals: supervision waking hours due to cog-language deficits, CG transfers, CG/min assist ambulation and stairs, CG bADLs  - target: dc home 3/23 with caregiver support and home care referral PT OT SLP  - caregiver training    # LABS  BLE doppler  CBC CMP Mg++ 3/11    #GOC  CODE STATUS: FULL CODE    addendum 5:37 PM  - IMPRESSION:  No evidence of deep venous thrombosis in either lower extremity.    Family informed by team    Outpatient Follow-up (Specialty/Name of physician):    Jacobo Miramontes  Physical/Rehab Medicine  [address] [place] [id number]  Phone: [phone]  Fax: [phone]  Follow Up Time:       62 YO female with PMH HTN, HLD, DM2, CVA (10/2023), who presented to [facility] ED on 3/1/24 with right sided facial, right arm and leg weakness, aphasia and episodes of confusion as well as a fall. MRI brain demonstrated multiple small acute infarctions in the left MCA distribution     #Left MCA CVA with expressive aphasia, right hemiparesis, and right facial droop  - MRI 3/1: Multiple foci of diffusion restriction scattered throughout the left MCA vascular distribution, compatible with acute infarctions. Chronic lacunar infarctions in the right centrum semiovale/corona radiata and left hemipons. Encephalomalacia/gliotic change in the anterior right temporal lobe. Foci of susceptibility artifact in the right basal ganglia, likely sequela of prior micro-hemorrhages.  - ASA & plavix for 3 months, Discontinue aspirin after 3 months  - High dose statin  - Continue Aricept 5 mg Q8AM 3/9. Patient tolerating, with improved initiation, processing, apraxia today  - Continue Comprehensive Rehab Program: PT/OT/ST, 3hours daily and 5 days weekly  - right elbow comfy splint to improve extension, right resting hand splint  - recreation therapy  - Precautions: fall, aspiration, cardiac, DM, AMS    # dysphonia  - ENT consult appreicated 3/7:  eval VC,  difficulty tolerating laryngoscopy through nose, (+)dried nasal crusting, tolerated laryngoscopy through mouth, vocal cords mobile bilaterally, no pooling, no lesions  - Nasal saline spray TID    # HTN  - Amlodipine 10 mg daily (increased on 3/7)  - Lisinopril 20mg daily  - BP (132/82 - 148/80)3/11    # HLD  - Lipitor 80mg daily    # Diabetes Mellitus Type 2 without complications  - A1c 5.5 on 3/2/24  - FS and ISS discontinued on 3/6    # leukocytosis  - WBC 13.5 3/11  - afebrile, asymptomatic although with episode dysuria over weekend  - UA negative 3/10  - Monitor, CBC in AM 3/12, discussed with patient, partner and team    # hypokalemia  - K+ 3.3 3/5. Replete 40 meq x 1--> 3.6 3/7--> 4.2 3/11  - KCl 20 meq daily re-started 3/7, home medication   - BMP 3/14    # hypomagnesemia  - Mg oxide supplemented   - Mg++ 1.5--> 1.9 3/7--> 2.0 3/11    # Pain management  - Tylenol PRN  - gabapentin 200 mg QHS 3/4   - controlled    # Bowel Regimen  - Senna  - miralax PRN    # Sleep:   - Melatonin 3mg nightly PRN    # FEN   - Diet: upgraded to minced/moist and thin liquids 3/5    # DVT ppx  - Bilateral LE duplex (3/8) NEGATIVE DVT  - Lovenox    # Case discussed in IDT rounds 3/6 (initial):  - mod-severe mixed expressive and receptive aphasia, +right shelby inattention, +aphasic, max-total assist toilet transfers and max assist UB dressing and bathing, total LB dressing and toileting. Min assist bed mobility, mod assist transfers, ambulates 10 feet with parallel bars with mod-max assist +occ impulsive  - goals: supervision waking hours due to cog-language deficits, CG transfers, CG/min assist ambulation and stairs, CG bADLs  - target: dc home 3/23 with caregiver support and home care referral PT OT SLP  - caregiver training    # LABS  CBC 3/12  CBC CMP 3/14    #GOC  CODE STATUS: FULL CODE      Outpatient Follow-up (Specialty/Name of physician):    Jacobo Miramontes  Physical/Rehab Medicine  [address] [place] [id number]  Phone: [phone]  Fax: [phone]  Follow Up Time:

## 2024-03-11 NOTE — PROGRESS NOTE ADULT - SUBJECTIVE AND OBJECTIVE BOX
Patient is a 63y old  Female who presents with a chief complaint of L MCA CVA with expressive aphasia, right hemiparesis, and right facial droop (11 Mar 2024 12:16)      Patient seen and examined at bedside. No overnight events reported.     ALLERGIES:  Allergy Status Unknown    MEDICATIONS  (STANDING):  amLODIPine   Tablet 10 milliGRAM(s) Oral daily  AQUAPHOR (petrolatum Ointment) 1 Application(s) Topical three times a day  aspirin enteric coated 81 milliGRAM(s) Oral daily  atorvastatin 80 milliGRAM(s) Oral at bedtime  clopidogrel Tablet 75 milliGRAM(s) Oral daily  dextrose 5%. 1000 milliLiter(s) (50 mL/Hr) IV Continuous <Continuous>  dextrose 50% Injectable 12.5 Gram(s) IV Push once  dextrose 50% Injectable 25 Gram(s) IV Push once  donepezil 5 milliGRAM(s) Oral <User Schedule>  enoxaparin Injectable 30 milliGRAM(s) SubCutaneous <User Schedule>  gabapentin 200 milliGRAM(s) Oral at bedtime  glucagon  Injectable 1 milliGRAM(s) IntraMuscular once  lisinopril 20 milliGRAM(s) Oral daily  nystatin    Suspension 256490 Unit(s) Oral four times a day  phenazopyridine 100 milliGRAM(s) Oral every 8 hours  potassium chloride   Powder 20 milliEquivalent(s) Oral daily  senna 2 Tablet(s) Oral at bedtime  sodium chloride 0.65% Nasal 1 Spray(s) Both Nostrils three times a day    MEDICATIONS  (PRN):  acetaminophen     Tablet .. 650 milliGRAM(s) Oral every 6 hours PRN Temp greater or equal to 38C (100.4F), Mild Pain (1 - 3)  aluminum hydroxide/magnesium hydroxide/simethicone Suspension 30 milliLiter(s) Oral every 4 hours PRN Dyspepsia  melatonin 3 milliGRAM(s) Oral at bedtime PRN Insomnia  ondansetron   Disintegrating Tablet 4 milliGRAM(s) Oral every 8 hours PRN Nausea and/or Vomiting  polyethylene glycol 3350 17 Gram(s) Oral daily PRN Constipation    Vital Signs Last 24 Hrs  T(F): 98 (11 Mar 2024 07:44), Max: 98 (11 Mar 2024 07:44)  HR: 82 (11 Mar 2024 07:44) (71 - 82)  BP: 132/82 (11 Mar 2024 07:44) (132/82 - 148/80)  RR: 16 (11 Mar 2024 07:44) (16 - 16)  SpO2: 98% (11 Mar 2024 07:44) (98% - 98%)  I&O's Summary    10 Mar 2024 07:01  -  11 Mar 2024 07:00  --------------------------------------------------------  IN: 0 mL / OUT: 1 mL / NET: -1 mL      PHYSICAL EXAM:  General: NAD  ENT: No gross hearing impairment, poor dentition, + thrush  Neck: Supple, No JVD  Lungs: Clear to auscultation bilaterally, good air entry, non-labored breathing  Cardio: RRR, S1/S2, No murmur  Abdomen: Soft, Nontender, Nondistended; Bowel sounds present  Extremities: No calf tenderness, No cyanosis, No pitting edema  Psych: Appropriate mood and affect  Neuro: aphasic, right upper extremity (distal) weakness     LABS:                        12.5   13.50 )-----------( 286      ( 11 Mar 2024 06:20 )             39.4     03-11    139  |  101  |  19  ----------------------------<  192  4.2   |  30  |  0.77    Ca    9.8      11 Mar 2024 06:20  Mg     2.0     03-11    TPro  7.5  /  Alb  3.9  /  TBili  0.7  /  DBili  x   /  AST  25  /  ALT  36  /  AlkPhos  64  03-11        03-02 Chol 165 mg/dL LDL -- HDL 54 mg/dL Trig 92 mg/dL      Urinalysis Basic - ( 11 Mar 2024 06:20 )    Color: x / Appearance: x / SG: x / pH: x  Gluc: 192 mg/dL / Ketone: x  / Bili: x / Urobili: x   Blood: x / Protein: x / Nitrite: x   Leuk Esterase: x / RBC: x / WBC x   Sq Epi: x / Non Sq Epi: x / Bacteria: x

## 2024-03-12 LAB
APPEARANCE UR: CLEAR — SIGNIFICANT CHANGE UP
BACTERIA # UR AUTO: ABNORMAL /HPF
BILIRUB UR-MCNC: ABNORMAL
COLOR SPEC: ABNORMAL
DIFF PNL FLD: NEGATIVE — SIGNIFICANT CHANGE UP
EPI CELLS # UR: 2 — SIGNIFICANT CHANGE UP
GLUCOSE UR QL: NEGATIVE MG/DL — SIGNIFICANT CHANGE UP
HCT VFR BLD CALC: 36.1 % — SIGNIFICANT CHANGE UP (ref 34.5–45)
HGB BLD-MCNC: 11.6 G/DL — SIGNIFICANT CHANGE UP (ref 11.5–15.5)
KETONES UR-MCNC: NEGATIVE MG/DL — SIGNIFICANT CHANGE UP
LEUKOCYTE ESTERASE UR-ACNC: ABNORMAL
MCHC RBC-ENTMCNC: 28.7 PG — SIGNIFICANT CHANGE UP (ref 27–34)
MCHC RBC-ENTMCNC: 32.1 GM/DL — SIGNIFICANT CHANGE UP (ref 32–36)
MCV RBC AUTO: 89.4 FL — SIGNIFICANT CHANGE UP (ref 80–100)
NITRITE UR-MCNC: POSITIVE
NRBC # BLD: 0 /100 WBCS — SIGNIFICANT CHANGE UP (ref 0–0)
PH UR: 5.5 — SIGNIFICANT CHANGE UP (ref 5–8)
PLATELET # BLD AUTO: 255 K/UL — SIGNIFICANT CHANGE UP (ref 150–400)
PROT UR-MCNC: SIGNIFICANT CHANGE UP MG/DL
RBC # BLD: 4.04 M/UL — SIGNIFICANT CHANGE UP (ref 3.8–5.2)
RBC # FLD: 11.9 % — SIGNIFICANT CHANGE UP (ref 10.3–14.5)
RBC CASTS # UR COMP ASSIST: 0 /HPF — SIGNIFICANT CHANGE UP (ref 0–4)
SP GR SPEC: 1.02 — SIGNIFICANT CHANGE UP (ref 1–1.03)
UROBILINOGEN FLD QL: 1 MG/DL — SIGNIFICANT CHANGE UP (ref 0.2–1)
WBC # BLD: 10.87 K/UL — HIGH (ref 3.8–10.5)
WBC # FLD AUTO: 10.87 K/UL — HIGH (ref 3.8–10.5)
WBC UR QL: 2 /HPF — SIGNIFICANT CHANGE UP (ref 0–5)

## 2024-03-12 PROCEDURE — 99232 SBSQ HOSP IP/OBS MODERATE 35: CPT

## 2024-03-12 RX ADMIN — Medication 1 SPRAY(S): at 21:21

## 2024-03-12 RX ADMIN — GABAPENTIN 200 MILLIGRAM(S): 400 CAPSULE ORAL at 21:21

## 2024-03-12 RX ADMIN — Medication 1 APPLICATION(S): at 21:22

## 2024-03-12 RX ADMIN — Medication 1 SPRAY(S): at 05:23

## 2024-03-12 RX ADMIN — Medication 100 MILLIGRAM(S): at 05:22

## 2024-03-12 RX ADMIN — Medication 100 MILLIGRAM(S): at 21:21

## 2024-03-12 RX ADMIN — Medication 100 MILLIGRAM(S): at 14:32

## 2024-03-12 RX ADMIN — Medication 81 MILLIGRAM(S): at 12:27

## 2024-03-12 RX ADMIN — Medication 500000 UNIT(S): at 12:27

## 2024-03-12 RX ADMIN — Medication 500000 UNIT(S): at 17:52

## 2024-03-12 RX ADMIN — LISINOPRIL 20 MILLIGRAM(S): 2.5 TABLET ORAL at 05:22

## 2024-03-12 RX ADMIN — AMLODIPINE BESYLATE 10 MILLIGRAM(S): 2.5 TABLET ORAL at 05:22

## 2024-03-12 RX ADMIN — Medication 500000 UNIT(S): at 21:22

## 2024-03-12 RX ADMIN — Medication 500000 UNIT(S): at 05:23

## 2024-03-12 RX ADMIN — ENOXAPARIN SODIUM 30 MILLIGRAM(S): 100 INJECTION SUBCUTANEOUS at 19:59

## 2024-03-12 RX ADMIN — SENNA PLUS 2 TABLET(S): 8.6 TABLET ORAL at 21:21

## 2024-03-12 RX ADMIN — Medication 3 MILLIGRAM(S): at 21:22

## 2024-03-12 RX ADMIN — Medication 1 SPRAY(S): at 14:32

## 2024-03-12 RX ADMIN — Medication 20 MILLIEQUIVALENT(S): at 12:27

## 2024-03-12 RX ADMIN — ATORVASTATIN CALCIUM 80 MILLIGRAM(S): 80 TABLET, FILM COATED ORAL at 21:22

## 2024-03-12 RX ADMIN — DONEPEZIL HYDROCHLORIDE 5 MILLIGRAM(S): 10 TABLET, FILM COATED ORAL at 07:30

## 2024-03-12 RX ADMIN — Medication 1 APPLICATION(S): at 05:23

## 2024-03-12 RX ADMIN — CLOPIDOGREL BISULFATE 75 MILLIGRAM(S): 75 TABLET, FILM COATED ORAL at 12:27

## 2024-03-12 NOTE — PROGRESS NOTE ADULT - ASSESSMENT
64 YO female with PMH HTN, HLD, DM2, CVA (10/2023), who presented to [facility] ED on 3/1/24 with right sided facial, right arm and leg weakness, aphasia and episodes of confusion as well as a fall. MRI brain demonstrated multiple small acute infarctions in the left MCA distribution     #Left MCA CVA with expressive aphasia, right hemiparesis, and right facial droop  - MRI 3/1: Multiple foci of diffusion restriction scattered throughout the left MCA vascular distribution, compatible with acute infarctions. Chronic lacunar infarctions in the right centrum semiovale/corona radiata and left hemipons. Encephalomalacia/gliotic change in the anterior right temporal lobe. Foci of susceptibility artifact in the right basal ganglia, likely sequela of prior micro-hemorrhages.  - ASA & plavix for 3 months, Discontinue aspirin after 3 months  - High dose statin  - Continue Aricept 5 mg Q8AM 3/9. Patient with episode of frustration and acting out, monitor   - Continue Comprehensive Rehab Program: PT/OT/ST, 3hours daily and 5 days weekly  - right elbow comfy splint to improve extension. Patient does not tolerate right resting hand splint, will request OT to re-eval fit  - recreation therapy  - Precautions: fall, aspiration, cardiac, DM, AMS    # dysphonia  - ENT 3/7:  eval VC,  difficulty tolerating laryngoscopy through nose, (+)dried nasal crusting, tolerated laryngoscopy through mouth, vocal cords mobile bilaterally, no pooling, no lesions  - Nasal saline spray TID    # HTN  - Amlodipine 10 mg daily (increased on 3/7)  - Lisinopril 20mg daily  - BP (127/78 - 172/84). Elevated BP read was at 5:20 prior to meds; flowsheet reviewed, otherwise stable over last 48 hours. Will continue to monitor, may need additional adjustment    # HLD  - Lipitor 80mg daily    # Diabetes Mellitus Type 2 without complications  - A1c 5.5 on 3/2/24  - FS and ISS discontinued on 3/6    # leukocytosis  - dysuria  - UA negative 3/10. Will repeat UA as dysuria persists  - WBC 13.5 3/11--> 10.87 3/12 improved, reviewed with patient and partner    # hypokalemia  - KCl 20 meq daily re-started 3/7, home medication   - K+  4.2 3/11, resolved   - BMP 3/14    # hypomagnesemia  - Mg oxide supplemented   - Mg++ 1.5--> 1.9 3/7--> 2.0 3/11    # Pain management  - Tylenol PRN  - gabapentin 200 mg QHS 3/4   - controlled    # Bowel Regimen  - Senna  - miralax PRN    # Sleep:   - Melatonin 3mg nightly PRN    # FEN   - Diet: upgraded to minced/moist and thin liquids 3/5    # DVT ppx  - Bilateral LE duplex (3/8) NEGATIVE DVT  - Lovenox    # Case discussed in IDT rounds 3/6 (initial):  - mod-severe mixed expressive and receptive aphasia, +right shelby inattention, +aphasic, max-total assist toilet transfers and max assist UB dressing and bathing, total LB dressing and toileting. Min assist bed mobility, mod assist transfers, ambulates 10 feet with parallel bars with mod-max assist +occ impulsive  - goals: supervision waking hours due to cog-language deficits, CG transfers, CG/min assist ambulation and stairs, CG bADLs  - target: dc home 3/23 with caregiver support and home care referral PT OT SLP  - caregiver training    # LABS  UA  CBC CMP 3/14    #GOC  CODE STATUS: FULL CODE      Outpatient Follow-up (Specialty/Name of physician):    Jacobo Miramontes  Physical/Rehab Medicine  [address] [place] [id number]  Phone: [phone]  Fax: [phone]  Follow Up Time:

## 2024-03-12 NOTE — PROGRESS NOTE ADULT - COMMENTS
Patient alert, smiling when examiners entered room. Less attempts to verbalize, although will answer yes/no directed questions. Still has impaired reliability, but able to self correct better and less perseverative    Denies headache, had recurrent episode dysuria yesterday with very concentrated appearing urine per partner. Repeat UA ordered

## 2024-03-12 NOTE — PROGRESS NOTE ADULT - MENTAL STATUS
became briefly agitated/upset when attempted to assess RUE splint (per partner, does not like, uncomfortable). Fingers II-V with Mas 1+ today, no hand swelling, appears to fit well with splint however thumb abduction seems uncomfortable. Will request OT for re-eval

## 2024-03-12 NOTE — PROGRESS NOTE ADULT - SUBJECTIVE AND OBJECTIVE BOX
Patient is a 63y old  Female who presents with a chief complaint of L MCA CVA with expressive aphasia, right hemiparesis, and right facial droop (11 Mar 2024 12:55)      HPI:  This ia a 62 YO female RH dominant with PMH HTN, HLD, DM2, CVA (10/2023), who presented to  [facility] ED on 3/1/24  following a fall at home. According to the partner, Connie, the patient was in her usual state of health up until 5 days prior to admission when she was noted to have right sided facial, arm and leg weakness, aphasia and episodes of confusion. She took ASA 81mg with improvement of symptoms; pt was able to ambulate and function at home but felt weak. On the day prior to admission, the patient fell prompting her partner to bring her to the ED.    On arrival, pt noted to have dense right sided facial droop and right sided hemiparesis and aphasia.  MRI brain demonstrated multiple small acute infarctions in the left MCA distribution and chronic ischemic changes from prior strokes. CTA head/neck: negative for LVO occlusion. Patient was evaluated by neurology, and recommended for ASA & plavix x 3 months.    Patient was evaluated by PM&R and therapy for functional deficits, gait/ADL impairments and acute rehabilitation was recommended. Patient was discharged to  [facility] on 3/4/24. (04 Mar 2024 13:15)      PAST MEDICAL & SURGICAL HISTORY:  HTN (hypertension)      DM (diabetes mellitus)      CVA (cerebrovascular accident)      No significant past surgical history          MEDICATIONS  (STANDING):  amLODIPine   Tablet 10 milliGRAM(s) Oral daily  AQUAPHOR (petrolatum Ointment) 1 Application(s) Topical three times a day  aspirin enteric coated 81 milliGRAM(s) Oral daily  atorvastatin 80 milliGRAM(s) Oral at bedtime  clopidogrel Tablet 75 milliGRAM(s) Oral daily  dextrose 5%. 1000 milliLiter(s) (50 mL/Hr) IV Continuous <Continuous>  dextrose 50% Injectable 12.5 Gram(s) IV Push once  dextrose 50% Injectable 25 Gram(s) IV Push once  donepezil 5 milliGRAM(s) Oral <User Schedule>  enoxaparin Injectable 30 milliGRAM(s) SubCutaneous <User Schedule>  gabapentin 200 milliGRAM(s) Oral at bedtime  glucagon  Injectable 1 milliGRAM(s) IntraMuscular once  lisinopril 20 milliGRAM(s) Oral daily  nystatin    Suspension 459776 Unit(s) Oral four times a day  phenazopyridine 100 milliGRAM(s) Oral every 8 hours  potassium chloride   Powder 20 milliEquivalent(s) Oral daily  senna 2 Tablet(s) Oral at bedtime  sodium chloride 0.65% Nasal 1 Spray(s) Both Nostrils three times a day    MEDICATIONS  (PRN):  acetaminophen     Tablet .. 650 milliGRAM(s) Oral every 6 hours PRN Temp greater or equal to 38C (100.4F), Mild Pain (1 - 3)  aluminum hydroxide/magnesium hydroxide/simethicone Suspension 30 milliLiter(s) Oral every 4 hours PRN Dyspepsia  melatonin 3 milliGRAM(s) Oral at bedtime PRN Insomnia  ondansetron   Disintegrating Tablet 4 milliGRAM(s) Oral every 8 hours PRN Nausea and/or Vomiting  polyethylene glycol 3350 17 Gram(s) Oral daily PRN Constipation      Allergies    Allergy Status Unknown    Intolerances          VITALS  63y  Vital Signs Last 24 Hrs  T(C): 36.3 (12 Mar 2024 09:09), Max: 36.3 (11 Mar 2024 21:18)  T(F): 97.3 (12 Mar 2024 09:09), Max: 97.3 (11 Mar 2024 21:18)  HR: 68 (12 Mar 2024 09:09) (68 - 79)  BP: 127/78 (12 Mar 2024 09:09) (127/78 - 172/84)  BP(mean): --  RR: 16 (12 Mar 2024 09:09) (16 - 16)  SpO2: 97% (12 Mar 2024 09:09) (97% - 98%)    Parameters below as of 12 Mar 2024 09:09  Patient On (Oxygen Delivery Method): room air      Daily     Daily         RECENT LABS:                          11.6   10.87 )-----------( 255      ( 12 Mar 2024 06:38 )             36.1     03-11    139  |  101  |  19  ----------------------------<  192<H>  4.2   |  30  |  0.77    Ca    9.8      11 Mar 2024 06:20  Mg     2.0     03-11    TPro  7.5  /  Alb  3.9  /  TBili  0.7  /  DBili  x   /  AST  25  /  ALT  36  /  AlkPhos  64  03-11    LIVER FUNCTIONS - ( 11 Mar 2024 06:20 )  Alb: 3.9 g/dL / Pro: 7.5 g/dL / ALK PHOS: 64 U/L / ALT: 36 U/L / AST: 25 U/L / GGT: x             Urinalysis Basic - ( 11 Mar 2024 06:20 )    Color: x / Appearance: x / SG: x / pH: x  Gluc: 192 mg/dL / Ketone: x  / Bili: x / Urobili: x   Blood: x / Protein: x / Nitrite: x   Leuk Esterase: x / RBC: x / WBC x   Sq Epi: x / Non Sq Epi: x / Bacteria: x          CAPILLARY BLOOD GLUCOSE

## 2024-03-13 LAB
APPEARANCE UR: CLEAR — SIGNIFICANT CHANGE UP
BACTERIA # UR AUTO: ABNORMAL /HPF
BILIRUB UR-MCNC: NEGATIVE — SIGNIFICANT CHANGE UP
COLOR SPEC: SIGNIFICANT CHANGE UP
DIFF PNL FLD: NEGATIVE — SIGNIFICANT CHANGE UP
EPI CELLS # UR: 0 — SIGNIFICANT CHANGE UP
GLUCOSE UR QL: 250 MG/DL
KETONES UR-MCNC: NEGATIVE MG/DL — SIGNIFICANT CHANGE UP
LEUKOCYTE ESTERASE UR-ACNC: ABNORMAL
NITRITE UR-MCNC: POSITIVE
PH UR: 6 — SIGNIFICANT CHANGE UP (ref 5–8)
PROT UR-MCNC: SIGNIFICANT CHANGE UP MG/DL
RBC CASTS # UR COMP ASSIST: 0 /HPF — SIGNIFICANT CHANGE UP (ref 0–4)
SP GR SPEC: 1.02 — SIGNIFICANT CHANGE UP (ref 1–1.03)
UROBILINOGEN FLD QL: 1 MG/DL — SIGNIFICANT CHANGE UP (ref 0.2–1)
WBC UR QL: 2 /HPF — SIGNIFICANT CHANGE UP (ref 0–5)

## 2024-03-13 PROCEDURE — 99232 SBSQ HOSP IP/OBS MODERATE 35: CPT

## 2024-03-13 RX ORDER — NITROFURANTOIN MACROCRYSTAL 50 MG
100 CAPSULE ORAL
Refills: 0 | Status: DISCONTINUED | OUTPATIENT
Start: 2024-03-13 | End: 2024-03-15

## 2024-03-13 RX ADMIN — Medication 100 MILLIGRAM(S): at 19:31

## 2024-03-13 RX ADMIN — Medication 1 APPLICATION(S): at 21:16

## 2024-03-13 RX ADMIN — Medication 100 MILLIGRAM(S): at 05:20

## 2024-03-13 RX ADMIN — Medication 3 MILLIGRAM(S): at 21:15

## 2024-03-13 RX ADMIN — Medication 81 MILLIGRAM(S): at 11:57

## 2024-03-13 RX ADMIN — GABAPENTIN 200 MILLIGRAM(S): 400 CAPSULE ORAL at 21:14

## 2024-03-13 RX ADMIN — Medication 500000 UNIT(S): at 17:30

## 2024-03-13 RX ADMIN — Medication 1 SPRAY(S): at 05:19

## 2024-03-13 RX ADMIN — Medication 1 SPRAY(S): at 21:15

## 2024-03-13 RX ADMIN — Medication 1 APPLICATION(S): at 13:41

## 2024-03-13 RX ADMIN — ATORVASTATIN CALCIUM 80 MILLIGRAM(S): 80 TABLET, FILM COATED ORAL at 21:14

## 2024-03-13 RX ADMIN — Medication 100 MILLIGRAM(S): at 13:40

## 2024-03-13 RX ADMIN — LISINOPRIL 20 MILLIGRAM(S): 2.5 TABLET ORAL at 05:20

## 2024-03-13 RX ADMIN — Medication 500000 UNIT(S): at 21:14

## 2024-03-13 RX ADMIN — Medication 1 APPLICATION(S): at 05:20

## 2024-03-13 RX ADMIN — Medication 500000 UNIT(S): at 11:57

## 2024-03-13 RX ADMIN — Medication 500000 UNIT(S): at 05:20

## 2024-03-13 RX ADMIN — DONEPEZIL HYDROCHLORIDE 5 MILLIGRAM(S): 10 TABLET, FILM COATED ORAL at 08:38

## 2024-03-13 RX ADMIN — CLOPIDOGREL BISULFATE 75 MILLIGRAM(S): 75 TABLET, FILM COATED ORAL at 11:57

## 2024-03-13 RX ADMIN — SENNA PLUS 2 TABLET(S): 8.6 TABLET ORAL at 21:14

## 2024-03-13 RX ADMIN — AMLODIPINE BESYLATE 10 MILLIGRAM(S): 2.5 TABLET ORAL at 05:20

## 2024-03-13 RX ADMIN — ENOXAPARIN SODIUM 30 MILLIGRAM(S): 100 INJECTION SUBCUTANEOUS at 20:59

## 2024-03-13 RX ADMIN — Medication 20 MILLIEQUIVALENT(S): at 11:57

## 2024-03-13 RX ADMIN — Medication 1 SPRAY(S): at 13:40

## 2024-03-13 NOTE — PROGRESS NOTE ADULT - SUBJECTIVE AND OBJECTIVE BOX
Patient is a 63y old  Female who presents with a chief complaint of L MCA CVA with expressive aphasia, right hemiparesis, and right facial droop (13 Mar 2024 09:58)    Patient seen and examined at bedside. Patient reported to be more agitated overnight.     ALLERGIES:  Allergy Status Unknown    MEDICATIONS  (STANDING):  amLODIPine   Tablet 10 milliGRAM(s) Oral daily  AQUAPHOR (petrolatum Ointment) 1 Application(s) Topical three times a day  aspirin enteric coated 81 milliGRAM(s) Oral daily  atorvastatin 80 milliGRAM(s) Oral at bedtime  clopidogrel Tablet 75 milliGRAM(s) Oral daily  dextrose 5%. 1000 milliLiter(s) (50 mL/Hr) IV Continuous <Continuous>  dextrose 50% Injectable 12.5 Gram(s) IV Push once  dextrose 50% Injectable 25 Gram(s) IV Push once  donepezil 5 milliGRAM(s) Oral <User Schedule>  enoxaparin Injectable 30 milliGRAM(s) SubCutaneous <User Schedule>  gabapentin 200 milliGRAM(s) Oral at bedtime  glucagon  Injectable 1 milliGRAM(s) IntraMuscular once  lisinopril 20 milliGRAM(s) Oral daily  nystatin    Suspension 713579 Unit(s) Oral four times a day  phenazopyridine 100 milliGRAM(s) Oral every 8 hours  potassium chloride   Powder 20 milliEquivalent(s) Oral daily  senna 2 Tablet(s) Oral at bedtime  sodium chloride 0.65% Nasal 1 Spray(s) Both Nostrils three times a day    MEDICATIONS  (PRN):  acetaminophen     Tablet .. 650 milliGRAM(s) Oral every 6 hours PRN Temp greater or equal to 38C (100.4F), Mild Pain (1 - 3)  aluminum hydroxide/magnesium hydroxide/simethicone Suspension 30 milliLiter(s) Oral every 4 hours PRN Dyspepsia  melatonin 3 milliGRAM(s) Oral at bedtime PRN Insomnia  ondansetron   Disintegrating Tablet 4 milliGRAM(s) Oral every 8 hours PRN Nausea and/or Vomiting  polyethylene glycol 3350 17 Gram(s) Oral daily PRN Constipation    Vital Signs Last 24 Hrs  T(F): 97.9 (13 Mar 2024 08:41), Max: 97.9 (13 Mar 2024 08:41)  HR: 91 (13 Mar 2024 08:41) (91 - 96)  BP: 138/80 (13 Mar 2024 08:41) (138/80 - 167/85)  RR: 15 (13 Mar 2024 08:41) (15 - 16)  SpO2: 98% (13 Mar 2024 08:41) (98% - 98%)  I&O's Summary    PHYSICAL EXAM:  General: NAD, aphasic   ENT: No gross hearing impairment, poor dentition, + thrush  Neck: Supple, No JVD  Lungs: Clear to auscultation bilaterally, good air entry, non-labored breathing  Cardio: RRR, S1/S2, No murmur  Abdomen: Soft, Nontender, Nondistended; Bowel sounds present  Extremities: No calf tenderness, No cyanosis, No pitting edema  Psych: Appropriate mood and affect  Neuro: aphasic, right hand/wrist weakness    LABS:                        11.6   10.87 )-----------( 255      ( 12 Mar 2024 06:38 )             36.1     03-11    139  |  101  |  19  ----------------------------<  192  4.2   |  30  |  0.77    Ca    9.8      11 Mar 2024 06:20  Mg     2.0     03-11    TPro  7.5  /  Alb  3.9  /  TBili  0.7  /  DBili  x   /  AST  25  /  ALT  36  /  AlkPhos  64  03-11    03-02 Chol 165 mg/dL LDL -- HDL 54 mg/dL Trig 92 mg/dL   Urinalysis Basic - ( 12 Mar 2024 18:00 )    Color: Orange / Appearance: Clear / S.022 / pH: x  Gluc: x / Ketone: Negative mg/dL  / Bili: Small / Urobili: 1.0 mg/dL   Blood: x / Protein: Trace mg/dL / Nitrite: Positive   Leuk Esterase: Small / RBC: 0 /HPF / WBC 2 /HPF   Sq Epi: x / Non Sq Epi: x / Bacteria: Few /HPF

## 2024-03-13 NOTE — PROGRESS NOTE ADULT - ASSESSMENT
64 YO female with PMH HTN, HLD, DM2, CVA (10/2023), who presented to [facility] ED on 3/1/24 with right sided facial, right arm and leg weakness, aphasia and episodes of confusion as well as a fall. MRI brain demonstrated multiple small acute infarctions in the left MCA distribution     #Left MCA CVA with expressive aphasia, right hemiparesis, and right facial droop  - MRI 3/1: Multiple foci of diffusion restriction scattered throughout the left MCA vascular distribution, compatible with acute infarctions. Chronic lacunar infarctions in the right centrum semiovale/corona radiata and left hemipons. Encephalomalacia/gliotic change in the anterior right temporal lobe. Foci of susceptibility artifact in the right basal ganglia, likely sequela of prior micro-hemorrhages.  - ASA & plavix for 3 months, Discontinue ASA after 3 months (6/1/24)  - High dose statin  - Continue Aricept 5 mg Q8AM 3/9. Receptive language appears improved, but will consider dc if restlessness/agitation persists  - Continue Comprehensive Rehab Program: PT/OT/ST, 3hours daily and 5 days weekly  - right elbow comfy splint to improve extension. Patient does not tolerate right resting hand splint, will request OT to re-eval fit  - recreation therapy  - Precautions: fall, aspiration, cardiac, DM, AMS    # dysphonia  - ENT 3/7:  eval VC,  difficulty tolerating laryngoscopy through nose, (+)dried nasal crusting, tolerated laryngoscopy through mouth, vocal cords mobile bilaterally, no pooling, no lesions  - Nasal saline spray TID    # HTN  - Amlodipine 10 mg daily (increased on 3/7)  - Lisinopril 20mg daily  - /80 - 167/85 3/13    # HLD  - Lipitor 80mg daily    # Diabetes Mellitus Type 2 without complications  - A1c 5.5 on 3/2/24  - FS and ISS discontinued on 3/6    # leukocytosis  - dysuria  - UA negative 3/10.  - Repeat UA 3/12: +nit sm LE WBC 2. Patient is symptomatic, dysuria, frequency, also more agitated. Medicine f/u re: management  - WBC 13.5 3/11--> 10.87 3/12. CBC 3/14    # hypokalemia  - KCl 20 meq daily re-started 3/7, home medication   - K+  4.2 3/11, resolved   - BMP 3/14    # hypomagnesemia  - Mg oxide supplemented   - Mg++ 1.5-->2.0 3/11    # Pain management  - Tylenol PRN  - gabapentin 200 mg QHS 3/4   - controlled    # Bowel Regimen  - Senna  - miralax PRN    # Sleep:   - Melatonin 3mg nightly PRN    # FEN   - Diet: upgraded to minced/moist and thin liquids 3/5    # DVT ppx  - Bilateral LE duplex (3/8) NEGATIVE DVT  - Lovenox    # Case discussed in IDT rounds 3/13 (follow up):  -       - goals: supervision waking hours due to cog-language deficits, CG transfers, CG/min assist ambulation and stairs, CG bADLs  - target: dc home 3/23 with caregiver support and home care referral PT OT SLP  - caregiver training    # LABS  f/u equivocal UA  CBC CMP 3/14    #GOC  CODE STATUS: FULL CODE      Outpatient Follow-up (Specialty/Name of physician):    Jacobo Miramontes  Physical/Rehab Medicine  [address] [place] [id number]  Phone: [phone]  Fax: [phone]  Follow Up Time:       62 YO female with PMH HTN, HLD, DM2, CVA (10/2023), who presented to [facility] ED on 3/1/24 with right sided facial, right arm and leg weakness, aphasia and episodes of confusion as well as a fall. MRI brain demonstrated multiple small acute infarctions in the left MCA distribution     #Left MCA CVA with expressive aphasia, right hemiparesis, and right facial droop  - MRI 3/1: Multiple foci of diffusion restriction scattered throughout the left MCA vascular distribution, compatible with acute infarctions. Chronic lacunar infarctions in the right centrum semiovale/corona radiata and left hemipons. Encephalomalacia/gliotic change in the anterior right temporal lobe. Foci of susceptibility artifact in the right basal ganglia, likely sequela of prior micro-hemorrhages.  - ASA & plavix for 3 months, Discontinue ASA after 3 months (6/1/24)  - High dose statin  - Continue Aricept 5 mg Q8AM 3/9. Receptive language appears improved, but will consider dc if restlessness/agitation persists  - Continue Comprehensive Rehab Program: PT/OT/ST, 3hours daily and 5 days weekly  - right elbow comfy splint to improve extension. Patient does not tolerate right resting hand splint, will request OT to re-eval fit  - recreation therapy  - Precautions: fall, aspiration, cardiac, DM, AMS    # dysphonia  - ENT 3/7:  eval VC,  difficulty tolerating laryngoscopy through nose, (+)dried nasal crusting, tolerated laryngoscopy through mouth, vocal cords mobile bilaterally, no pooling, no lesions  - Nasal saline spray TID    # HTN  - Amlodipine 10 mg daily (increased on 3/7)  - Lisinopril 20mg daily  - /80 - 167/85 3/13    # HLD  - Lipitor 80mg daily    # Diabetes Mellitus Type 2 without complications  - A1c 5.5 on 3/2/24  - FS and ISS discontinued on 3/6    # leukocytosis  - dysuria  - UA negative 3/10.  - Repeat UA 3/12: +nit sm LE WBC 2. Patient is symptomatic, dysuria, frequency, also more agitated. Medicine f/u re: management appreciated. Will start empiric Abx and send urine Cx; if negative, dc Abx  - WBC 13.5 3/11--> 10.87 3/12. CBC 3/14    # hypokalemia  - KCl 20 meq daily re-started 3/7, home medication   - K+  4.2 3/11, resolved   - BMP 3/14    # hypomagnesemia  - Mg oxide supplemented   - Mg++ 1.5-->2.0 3/11    # Pain management  - Tylenol PRN  - gabapentin 200 mg QHS 3/4   - controlled    # Bowel Regimen  - Senna  - miralax PRN    # Sleep:   - Melatonin 3mg nightly PRN    # FEN   - Diet: upgraded to minced/moist and thin liquids 3/5    # DVT ppx  - Bilateral LE duplex (3/8) NEGATIVE DVT  - Lovenox    # Case discussed in IDT rounds 3/13 (follow up):  - mod assist transfers, ambulates 70 feet wiht HHA and max assist ACE wrap DF assist right ankle, min assist-CG bed mobility, min assist transfers, emotionally labile, improved yes/no reliability, set up/supervision eating and grooming, mod assist LB dressing, mod assist toilet and shower trnasfers  - adjusted goals: supervision due to cog-language deficits, CG transfers from wheelchair level including to toilet, CG assist bADLs, WC level mobility on dc. Will need home modifications for WC including potential first floor set up, team to start with partner  - adjusted target: dc home 3/26 with caregiver support and home care referral PT OT SLP  - caregiver training    # LABS  urine Cx  CBC CMP 3/14    #GOC  CODE STATUS: FULL CODE      Outpatient Follow-up (Specialty/Name of physician):    Jacobo Miramontes  Physical/Rehab Medicine  [address] [place] [id number]  Phone: [phone]  Fax: [phone]  Follow Up Time:       64 YO female with PMH HTN, HLD, DM2, CVA (10/2023), who presented to [facility] ED on 3/1/24 with right sided facial, right arm and leg weakness, aphasia and episodes of confusion as well as a fall. MRI brain demonstrated multiple small acute infarctions in the left MCA distribution     #Left MCA CVA with expressive aphasia, right hemiparesis, and right facial droop  - MRI 3/1: Multiple foci of diffusion restriction scattered throughout the left MCA vascular distribution, compatible with acute infarctions. Chronic lacunar infarctions in the right centrum semiovale/corona radiata and left hemipons. Encephalomalacia/gliotic change in the anterior right temporal lobe. Foci of susceptibility artifact in the right basal ganglia, likely sequela of prior micro-hemorrhages.  - ASA & plavix for 3 months, Discontinue ASA after 3 months (6/1/24)  - High dose statin  - Continue Aricept 5 mg Q8AM 3/9. Receptive language appears improved, but will consider dc if restlessness/agitation persists  - Continue Comprehensive Rehab Program: PT/OT/ST, 3hours daily and 5 days weekly  - right elbow comfy splint to improve extension. Patient does not tolerate right resting hand splint, will request OT to re-eval fit  - recreation therapy  - Precautions: fall, aspiration, cardiac, DM, AMS    # dysphonia  - ENT 3/7:  eval VC,  difficulty tolerating laryngoscopy through nose, (+)dried nasal crusting, tolerated laryngoscopy through mouth, vocal cords mobile bilaterally, no pooling, no lesions  - Nasal saline spray TID    # HTN  - Amlodipine 10 mg daily (increased on 3/7)  - Lisinopril 20mg daily  - /80 - 167/85 3/13    # HLD  - Lipitor 80mg daily    # Diabetes Mellitus Type 2 without complications  - A1c 5.5 on 3/2/24  - FS and ISS discontinued on 3/6    # leukocytosis  - dysuria  - UA negative 3/10.  - Repeat UA 3/12: +nit sm LE WBC 2. Patient is symptomatic, dysuria, frequency, also more agitated. Medicine f/u re: management appreciated. Will repeat UA and obtain Cx at same time. Hold Abx for now  - check bladder scan x 1 r/o retention  - WBC 13.5 3/11--> 10.87 3/12. CBC 3/14    # hypokalemia  - KCl 20 meq daily re-started 3/7, home medication   - K+  4.2 3/11, resolved   - BMP 3/14    # hypomagnesemia  - Mg oxide supplemented   - Mg++ 1.5-->2.0 3/11    # Pain management  - Tylenol PRN  - gabapentin 200 mg QHS 3/4   - controlled    # Bowel Regimen  - Senna  - miralax PRN    # Sleep:   - Melatonin 3mg nightly PRN    # FEN   - Diet: upgraded to minced/moist and thin liquids 3/5    # DVT ppx  - Bilateral LE duplex (3/8) NEGATIVE DVT  - Lovenox    # Case discussed in IDT rounds 3/13 (follow up):  - mod assist transfers, ambulates 70 feet wiht HHA and max assist ACE wrap DF assist right ankle, min assist-CG bed mobility, min assist transfers, emotionally labile, improved yes/no reliability, set up/supervision eating and grooming, mod assist LB dressing, mod assist toilet and shower trnasfers  - adjusted goals: supervision due to cog-language deficits, CG transfers from wheelchair level including to toilet, CG assist bADLs, WC level mobility on dc. Will need home modifications for WC including potential first floor set up, team to start with partner  - adjusted target: dc home 3/26 with caregiver support and home care referral PT OT SLP  - caregiver training    # LABS  UA/urine Cx  bladder scan  CBC CMP 3/14    #GOC  CODE STATUS: FULL CODE      Outpatient Follow-up (Specialty/Name of physician):    Jacobo Miramontes  Physical/Rehab Medicine  [address] [place] [id number]  Phone: [phone]  Fax: [phone]  Follow Up Time:       64 YO female with PMH HTN, HLD, DM2, CVA (10/2023), who presented to [facility] ED on 3/1/24 with right sided facial, right arm and leg weakness, aphasia and episodes of confusion as well as a fall. MRI brain demonstrated multiple small acute infarctions in the left MCA distribution     #Left MCA CVA with expressive aphasia, right hemiparesis, and right facial droop  - MRI 3/1: Multiple foci of diffusion restriction scattered throughout the left MCA vascular distribution, compatible with acute infarctions. Chronic lacunar infarctions in the right centrum semiovale/corona radiata and left hemipons. Encephalomalacia/gliotic change in the anterior right temporal lobe. Foci of susceptibility artifact in the right basal ganglia, likely sequela of prior micro-hemorrhages.  - ASA & plavix for 3 months, Discontinue ASA after 3 months (6/1/24)  - High dose statin  - Continue Aricept 5 mg Q8AM 3/9. Receptive language appears improved, but will consider dc if restlessness/agitation persists  - Continue Comprehensive Rehab Program: PT/OT/ST, 3hours daily and 5 days weekly  - right elbow comfy splint to improve extension. Patient does not tolerate right resting hand splint, will request OT to re-eval fit  - will benefit from AFO for right ankle DF assist and knee stabilization on dc  - recreation therapy  - Precautions: fall, aspiration, cardiac, DM, AMS    # dysphonia  - ENT 3/7:  eval VC,  difficulty tolerating laryngoscopy through nose, (+)dried nasal crusting, tolerated laryngoscopy through mouth, vocal cords mobile bilaterally, no pooling, no lesions  - Nasal saline spray TID    # HTN  - Amlodipine 10 mg daily (increased on 3/7)  - Lisinopril 20mg daily  - /80 - 167/85 3/13    # HLD  - Lipitor 80mg daily    # Diabetes Mellitus Type 2 without complications  - A1c 5.5 on 3/2/24  - FS and ISS discontinued on 3/6    # leukocytosis  - dysuria  - UA negative 3/10.  - Repeat UA 3/12: +nit sm LE WBC 2. Patient is symptomatic, dysuria, frequency, also more agitated. Medicine f/u re: management appreciated. Will repeat UA and obtain Cx at same time. Hold Abx for now  - check bladder scan x 1 r/o retention  - WBC 13.5 3/11--> 10.87 3/12. CBC 3/14    # hypokalemia  - KCl 20 meq daily re-started 3/7, home medication   - K+  4.2 3/11, resolved   - BMP 3/14    # hypomagnesemia  - Mg oxide supplemented   - Mg++ 1.5-->2.0 3/11    # Pain management  - Tylenol PRN  - gabapentin 200 mg QHS 3/4   - controlled    # Bowel Regimen  - Senna  - miralax PRN    # Sleep:   - Melatonin 3mg nightly PRN    # FEN   - Diet: upgraded to minced/moist and thin liquids 3/5    # DVT ppx  - Bilateral LE duplex (3/8) NEGATIVE DVT  - Lovenox    # Case discussed in IDT rounds 3/13 (follow up):  - mod assist transfers, ambulates 70 feet wiht HHA and max assist ACE wrap DF assist right ankle, min assist-CG bed mobility, min assist transfers, emotionally labile, improved yes/no reliability, set up/supervision eating and grooming, mod assist LB dressing, mod assist toilet and shower trnasfers  - adjusted goals: supervision due to cog-language deficits, CG transfers from wheelchair level including to toilet, CG assist bADLs, WC level mobility on dc. Will need home modifications for WC including potential first floor set up, team to start with partner  - adjusted target: dc home 3/26 with caregiver support and home care referral PT OT SLP  - caregiver training    # LABS  UA/urine Cx  bladder scan  CBC CMP 3/14    #GOC  CODE STATUS: FULL CODE      Outpatient Follow-up (Specialty/Name of physician):    Jacobo Miramontes  Physical/Rehab Medicine  [address] [place] [id number]  Phone: [phone]  Fax: [phone]  Follow Up Time:

## 2024-03-13 NOTE — PROGRESS NOTE ADULT - COMMENTS
Patient seen with SLP. She was able to correctly use picture cards to express wants (medicine, bathroom) when displayed vertically (not horizontally). She did express need to toilet, and some urgency; staff has noted as we did yesterday that patient seems more frustrated, labile and agitated. Possibility SE medication vs UTI discussed

## 2024-03-13 NOTE — PROGRESS NOTE ADULT - ASSESSMENT
63F with HTN, HLD, DM2, prior CVA, now in acute rehab after recently dx left MCA stroke    #Left MCA stroke  #Aphasic due to above  -c/w ASA and plavix  -c/w statin  -PT/OT/SLP  -c/w Aricept    #Possible UTI  -U/A really not consistent with UTI, unable to appreciate any symptoms due to aphasia, benign abdominal exam  -However, concern is for agitation, which has several etiologies  -Will repeat U/A and urine culture now  -However, would not start empiric antibiotics yet  -Check bladder scan x 1  -( If retaining urine, will start empiric antibiotics pending culture )    #Essential HTN  -stable on current regimen    #DM2  A1C with Estimated Average Glucose Result: 5.5 % (03-02-24 @ 06:25)  Stable, no longer needing FS    #Leukocytosis   -Non-specific, could be "hemoconcentration"  -thrush noted - on nystatin  -U/A not consistent with UTI   -Improving overall  -Continue to encourage PO hydration    #Oral thrush  -c/w Nystatin    #DVT ppx: Lovenox

## 2024-03-13 NOTE — PROGRESS NOTE ADULT - SUBJECTIVE AND OBJECTIVE BOX
Patient is a 63y old  Female who presents with a chief complaint of L MCA CVA with expressive aphasia, right hemiparesis, and right facial droop (12 Mar 2024 13:20)      HPI:  This ia a 64 YO female RH dominant with PMH HTN, HLD, DM2, CVA (10/2023), who presented to  [facility] ED on 3/1/24  following a fall at home. According to the partner, Connie, the patient was in her usual state of health up until 5 days prior to admission when she was noted to have right sided facial, arm and leg weakness, aphasia and episodes of confusion. She took ASA 81mg with improvement of symptoms; pt was able to ambulate and function at home but felt weak. On the day prior to admission, the patient fell prompting her partner to bring her to the ED.    On arrival, pt noted to have dense right sided facial droop and right sided hemiparesis and aphasia.  MRI brain demonstrated multiple small acute infarctions in the left MCA distribution and chronic ischemic changes from prior strokes. CTA head/neck: negative for LVO occlusion. Patient was evaluated by neurology, and recommended for ASA & plavix x 3 months.    Patient was evaluated by PM&R and therapy for functional deficits, gait/ADL impairments and acute rehabilitation was recommended. Patient was discharged to  [facility] on 3/4/24. (04 Mar 2024 13:15)      PAST MEDICAL & SURGICAL HISTORY:  HTN (hypertension)      DM (diabetes mellitus)      CVA (cerebrovascular accident)      No significant past surgical history          MEDICATIONS  (STANDING):  amLODIPine   Tablet 10 milliGRAM(s) Oral daily  AQUAPHOR (petrolatum Ointment) 1 Application(s) Topical three times a day  aspirin enteric coated 81 milliGRAM(s) Oral daily  atorvastatin 80 milliGRAM(s) Oral at bedtime  clopidogrel Tablet 75 milliGRAM(s) Oral daily  dextrose 5%. 1000 milliLiter(s) (50 mL/Hr) IV Continuous <Continuous>  dextrose 50% Injectable 12.5 Gram(s) IV Push once  dextrose 50% Injectable 25 Gram(s) IV Push once  donepezil 5 milliGRAM(s) Oral <User Schedule>  enoxaparin Injectable 30 milliGRAM(s) SubCutaneous <User Schedule>  gabapentin 200 milliGRAM(s) Oral at bedtime  glucagon  Injectable 1 milliGRAM(s) IntraMuscular once  lisinopril 20 milliGRAM(s) Oral daily  nystatin    Suspension 377193 Unit(s) Oral four times a day  phenazopyridine 100 milliGRAM(s) Oral every 8 hours  potassium chloride   Powder 20 milliEquivalent(s) Oral daily  senna 2 Tablet(s) Oral at bedtime  sodium chloride 0.65% Nasal 1 Spray(s) Both Nostrils three times a day    MEDICATIONS  (PRN):  acetaminophen     Tablet .. 650 milliGRAM(s) Oral every 6 hours PRN Temp greater or equal to 38C (100.4F), Mild Pain (1 - 3)  aluminum hydroxide/magnesium hydroxide/simethicone Suspension 30 milliLiter(s) Oral every 4 hours PRN Dyspepsia  melatonin 3 milliGRAM(s) Oral at bedtime PRN Insomnia  ondansetron   Disintegrating Tablet 4 milliGRAM(s) Oral every 8 hours PRN Nausea and/or Vomiting  polyethylene glycol 3350 17 Gram(s) Oral daily PRN Constipation      Allergies    Allergy Status Unknown    Intolerances          VITALS  63y  Vital Signs Last 24 Hrs  T(C): 36.6 (13 Mar 2024 08:41), Max: 36.6 (13 Mar 2024 08:41)  T(F): 97.9 (13 Mar 2024 08:41), Max: 97.9 (13 Mar 2024 08:41)  HR: 91 (13 Mar 2024 08:41) (91 - 96)  BP: 138/80 (13 Mar 2024 08:41) (138/80 - 167/85)  BP(mean): --  RR: 15 (13 Mar 2024 08:41) (15 - 16)  SpO2: 98% (13 Mar 2024 08:41) (98% - 98%)    Parameters below as of 13 Mar 2024 08:41  Patient On (Oxygen Delivery Method): room air      Daily     Daily         RECENT LABS:                          11.6   10.87 )-----------( 255      ( 12 Mar 2024 06:38 )             36.1               Urinalysis Basic - ( 12 Mar 2024 18:00 )    Color: Orange / Appearance: Clear / S.022 / pH: x  Gluc: x / Ketone: Negative mg/dL  / Bili: Small / Urobili: 1.0 mg/dL   Blood: x / Protein: Trace mg/dL / Nitrite: Positive   Leuk Esterase: Small / RBC: 0 /HPF / WBC 2 /HPF   Sq Epi: x / Non Sq Epi: x / Bacteria: Few /HPF          CAPILLARY BLOOD GLUCOSE                   Patient is a 63y old  Female who presents with a chief complaint of L MCA CVA with expressive aphasia, right hemiparesis, and right facial droop (12 Mar 2024 13:20)      HPI:  This ia a 62 YO female RH dominant with PMH HTN, HLD, DM2, CVA (10/2023), who presented to  [facility] ED on 3/1/24  following a fall at home. According to the partner, Connie, the patient was in her usual state of health up until 5 days prior to admission when she was noted to have right sided facial, arm and leg weakness, aphasia and episodes of confusion. She took ASA 81mg with improvement of symptoms; pt was able to ambulate and function at home but felt weak. On the day prior to admission, the patient fell prompting her partner to bring her to the ED.    On arrival, pt noted to have dense right sided facial droop and right sided hemiparesis and aphasia.  MRI brain demonstrated multiple small acute infarctions in the left MCA distribution and chronic ischemic changes from prior strokes. CTA head/neck: negative for LVO occlusion. Patient was evaluated by neurology, and recommended for ASA & plavix x 3 months.    Patient was evaluated by PM&R and therapy for functional deficits, gait/ADL impairments and acute rehabilitation was recommended. Patient was discharged to  [facility] on 3/4/24. (04 Mar 2024 13:15)      PAST MEDICAL & SURGICAL HISTORY:  HTN (hypertension)      DM (diabetes mellitus)      CVA (cerebrovascular accident)      No significant past surgical history          MEDICATIONS  (STANDING):  amLODIPine   Tablet 10 milliGRAM(s) Oral daily  AQUAPHOR (petrolatum Ointment) 1 Application(s) Topical three times a day  aspirin enteric coated 81 milliGRAM(s) Oral daily  atorvastatin 80 milliGRAM(s) Oral at bedtime  clopidogrel Tablet 75 milliGRAM(s) Oral daily  dextrose 5%. 1000 milliLiter(s) (50 mL/Hr) IV Continuous <Continuous>  dextrose 50% Injectable 12.5 Gram(s) IV Push once  dextrose 50% Injectable 25 Gram(s) IV Push once  donepezil 5 milliGRAM(s) Oral <User Schedule>  enoxaparin Injectable 30 milliGRAM(s) SubCutaneous <User Schedule>  gabapentin 200 milliGRAM(s) Oral at bedtime  glucagon  Injectable 1 milliGRAM(s) IntraMuscular once  lisinopril 20 milliGRAM(s) Oral daily  nystatin    Suspension 508542 Unit(s) Oral four times a day  phenazopyridine 100 milliGRAM(s) Oral every 8 hours  potassium chloride   Powder 20 milliEquivalent(s) Oral daily  senna 2 Tablet(s) Oral at bedtime  sodium chloride 0.65% Nasal 1 Spray(s) Both Nostrils three times a day    MEDICATIONS  (PRN):  acetaminophen     Tablet .. 650 milliGRAM(s) Oral every 6 hours PRN Temp greater or equal to 38C (100.4F), Mild Pain (1 - 3)  aluminum hydroxide/magnesium hydroxide/simethicone Suspension 30 milliLiter(s) Oral every 4 hours PRN Dyspepsia  melatonin 3 milliGRAM(s) Oral at bedtime PRN Insomnia  ondansetron   Disintegrating Tablet 4 milliGRAM(s) Oral every 8 hours PRN Nausea and/or Vomiting  polyethylene glycol 3350 17 Gram(s) Oral daily PRN Constipation      Allergies    Allergy Status Unknown    Intolerances          VITALS  63y  Vital Signs Last 24 Hrs  T(C): 36.6 (13 Mar 2024 08:41), Max: 36.6 (13 Mar 2024 08:41)  T(F): 97.9 (13 Mar 2024 08:41), Max: 97.9 (13 Mar 2024 08:41)  HR: 91 (13 Mar 2024 08:41) (91 - 96)  BP: 138/80 (13 Mar 2024 08:41) (138/80 - 167/85)  BP(mean): --  RR: 15 (13 Mar 2024 08:41) (15 - 16)  SpO2: 98% (13 Mar 2024 08:41) (98% - 98%)    Parameters below as of 13 Mar 2024 08:41  Patient On (Oxygen Delivery Method): room air      Daily     Daily         RECENT LABS:                          11.6   10.87 )-----------( 255      ( 12 Mar 2024 06:38 )             36.1               Urinalysis Basic - ( 12 Mar 2024 18:00 )    Color: Orange / Appearance: Clear / S.022 / pH: x  Gluc: x / Ketone: Negative mg/dL  / Bili: Small / Urobili: 1.0 mg/dL   Blood: x / Protein: Trace mg/dL / Nitrite: Positive   Leuk Esterase: Small / RBC: 0 /HPF / WBC 2 /HPF   Sq Epi: x / Non Sq Epi: x / Bacteria: Few /HPF          CAPILLARY BLOOD GLUCOSE

## 2024-03-13 NOTE — PROGRESS NOTE ADULT - MENTAL STATUS
vocalizing, not verbalizing  used picture cards and head nods/gestures  alert, can follow some simple 1 step verbal commands inconsistently but requires cues

## 2024-03-14 LAB
ALBUMIN SERPL ELPH-MCNC: 3.8 G/DL — SIGNIFICANT CHANGE UP (ref 3.3–5)
ALP SERPL-CCNC: 63 U/L — SIGNIFICANT CHANGE UP (ref 40–120)
ALT FLD-CCNC: 36 U/L — SIGNIFICANT CHANGE UP (ref 10–45)
ANION GAP SERPL CALC-SCNC: 11 MMOL/L — SIGNIFICANT CHANGE UP (ref 5–17)
AST SERPL-CCNC: 21 U/L — SIGNIFICANT CHANGE UP (ref 10–40)
BASOPHILS # BLD AUTO: 0.08 K/UL — SIGNIFICANT CHANGE UP (ref 0–0.2)
BASOPHILS NFR BLD AUTO: 0.6 % — SIGNIFICANT CHANGE UP (ref 0–2)
BILIRUB SERPL-MCNC: 1.6 MG/DL — HIGH (ref 0.2–1.2)
BUN SERPL-MCNC: 18 MG/DL — SIGNIFICANT CHANGE UP (ref 7–23)
CALCIUM SERPL-MCNC: 9.6 MG/DL — SIGNIFICANT CHANGE UP (ref 8.4–10.5)
CHLORIDE SERPL-SCNC: 100 MMOL/L — SIGNIFICANT CHANGE UP (ref 96–108)
CO2 SERPL-SCNC: 28 MMOL/L — SIGNIFICANT CHANGE UP (ref 22–31)
CREAT SERPL-MCNC: 0.72 MG/DL — SIGNIFICANT CHANGE UP (ref 0.5–1.3)
CULTURE RESULTS: SIGNIFICANT CHANGE UP
EGFR: 94 ML/MIN/1.73M2 — SIGNIFICANT CHANGE UP
EOSINOPHIL # BLD AUTO: 0.05 K/UL — SIGNIFICANT CHANGE UP (ref 0–0.5)
EOSINOPHIL NFR BLD AUTO: 0.4 % — SIGNIFICANT CHANGE UP (ref 0–6)
GLUCOSE SERPL-MCNC: 243 MG/DL — HIGH (ref 70–99)
HCT VFR BLD CALC: 36.6 % — SIGNIFICANT CHANGE UP (ref 34.5–45)
HGB BLD-MCNC: 12 G/DL — SIGNIFICANT CHANGE UP (ref 11.5–15.5)
IMM GRANULOCYTES NFR BLD AUTO: 0.4 % — SIGNIFICANT CHANGE UP (ref 0–0.9)
LYMPHOCYTES # BLD AUTO: 2.91 K/UL — SIGNIFICANT CHANGE UP (ref 1–3.3)
LYMPHOCYTES # BLD AUTO: 21.6 % — SIGNIFICANT CHANGE UP (ref 13–44)
MAGNESIUM SERPL-MCNC: 1.8 MG/DL — SIGNIFICANT CHANGE UP (ref 1.6–2.6)
MCHC RBC-ENTMCNC: 29 PG — SIGNIFICANT CHANGE UP (ref 27–34)
MCHC RBC-ENTMCNC: 32.8 GM/DL — SIGNIFICANT CHANGE UP (ref 32–36)
MCV RBC AUTO: 88.4 FL — SIGNIFICANT CHANGE UP (ref 80–100)
MONOCYTES # BLD AUTO: 0.76 K/UL — SIGNIFICANT CHANGE UP (ref 0–0.9)
MONOCYTES NFR BLD AUTO: 5.6 % — SIGNIFICANT CHANGE UP (ref 2–14)
NEUTROPHILS # BLD AUTO: 9.61 K/UL — HIGH (ref 1.8–7.4)
NEUTROPHILS NFR BLD AUTO: 71.4 % — SIGNIFICANT CHANGE UP (ref 43–77)
NRBC # BLD: 0 /100 WBCS — SIGNIFICANT CHANGE UP (ref 0–0)
PLATELET # BLD AUTO: 311 K/UL — SIGNIFICANT CHANGE UP (ref 150–400)
POTASSIUM SERPL-MCNC: 3.9 MMOL/L — SIGNIFICANT CHANGE UP (ref 3.5–5.3)
POTASSIUM SERPL-SCNC: 3.9 MMOL/L — SIGNIFICANT CHANGE UP (ref 3.5–5.3)
PROT SERPL-MCNC: 7.6 G/DL — SIGNIFICANT CHANGE UP (ref 6–8.3)
RBC # BLD: 4.14 M/UL — SIGNIFICANT CHANGE UP (ref 3.8–5.2)
RBC # FLD: 11.9 % — SIGNIFICANT CHANGE UP (ref 10.3–14.5)
SODIUM SERPL-SCNC: 139 MMOL/L — SIGNIFICANT CHANGE UP (ref 135–145)
SPECIMEN SOURCE: SIGNIFICANT CHANGE UP
WBC # BLD: 13.47 K/UL — HIGH (ref 3.8–10.5)
WBC # FLD AUTO: 13.47 K/UL — HIGH (ref 3.8–10.5)

## 2024-03-14 PROCEDURE — 99232 SBSQ HOSP IP/OBS MODERATE 35: CPT

## 2024-03-14 RX ADMIN — Medication 1 APPLICATION(S): at 05:45

## 2024-03-14 RX ADMIN — Medication 1 SPRAY(S): at 05:43

## 2024-03-14 RX ADMIN — Medication 1 SPRAY(S): at 21:28

## 2024-03-14 RX ADMIN — GABAPENTIN 200 MILLIGRAM(S): 400 CAPSULE ORAL at 21:27

## 2024-03-14 RX ADMIN — Medication 500000 UNIT(S): at 21:27

## 2024-03-14 RX ADMIN — Medication 100 MILLIGRAM(S): at 05:43

## 2024-03-14 RX ADMIN — Medication 3 MILLIGRAM(S): at 21:27

## 2024-03-14 RX ADMIN — Medication 500000 UNIT(S): at 17:15

## 2024-03-14 RX ADMIN — SENNA PLUS 2 TABLET(S): 8.6 TABLET ORAL at 18:34

## 2024-03-14 RX ADMIN — DONEPEZIL HYDROCHLORIDE 5 MILLIGRAM(S): 10 TABLET, FILM COATED ORAL at 08:52

## 2024-03-14 RX ADMIN — ENOXAPARIN SODIUM 30 MILLIGRAM(S): 100 INJECTION SUBCUTANEOUS at 20:59

## 2024-03-14 RX ADMIN — Medication 1 APPLICATION(S): at 21:05

## 2024-03-14 RX ADMIN — Medication 1 SPRAY(S): at 14:49

## 2024-03-14 RX ADMIN — Medication 1 APPLICATION(S): at 14:51

## 2024-03-14 RX ADMIN — ATORVASTATIN CALCIUM 80 MILLIGRAM(S): 80 TABLET, FILM COATED ORAL at 21:27

## 2024-03-14 RX ADMIN — Medication 500000 UNIT(S): at 05:43

## 2024-03-14 RX ADMIN — LISINOPRIL 20 MILLIGRAM(S): 2.5 TABLET ORAL at 05:43

## 2024-03-14 RX ADMIN — Medication 100 MILLIGRAM(S): at 17:16

## 2024-03-14 RX ADMIN — AMLODIPINE BESYLATE 10 MILLIGRAM(S): 2.5 TABLET ORAL at 05:43

## 2024-03-14 NOTE — PROGRESS NOTE ADULT - COMMENTS
Patient seen with SLP. She was able to match names and pictures of objects correctly, inconsistently identified pictures in vertical position. Patient reports pain with urination. UA last night positive for moderate LE, positive nitrite, only a few bacteria and 2 WBC. However, patient with continued dysuria and uptrending WBC today. Updated patient and partner about starting antibiotic last night for UTI. Also discussed possibility of starting medication for RUE spasticity, the potential that UTI could exacerbate symptoms, as well as the possibility of drowsiness as a side effect. Patient's partner notes patient was drowsy earlier this week so is hesitant in starting another medication at this time. Patient seen with SLP. Partner also attending. She was able to match names and pictures of objects correctly, inconsistently identified pictures in vertical position. Patient reports pain with urination. Reproducible answer. UA last night positive for moderate LE, positive nitrite, only a few bacteria and 2 WBC. However, patient with continued dysuria and uptrending WBC today. Updated patient and partner about starting antibiotic last night for UTI. Also discussed possibility of starting medication for RUE spasticity, the potential that UTI could exacerbate symptoms, as well as the possibility of drowsiness as a side effect. Patient's partner notes patient was drowsy earlier this week so is hesitant in starting another medication at this time.

## 2024-03-14 NOTE — PROGRESS NOTE ADULT - SUBJECTIVE AND OBJECTIVE BOX
Patient is a 63y old  Female who presents with a chief complaint of L MCA CVA with expressive aphasia, right hemiparesis, and right facial droop (12 Mar 2024 13:20)    HPI:  This ia a 64 YO female RH dominant with PMH HTN, HLD, DM2, CVA (10/2023), who presented to  [facility] ED on 3/1/24  following a fall at home. According to the partner, Connie, the patient was in her usual state of health up until 5 days prior to admission when she was noted to have right sided facial, arm and leg weakness, aphasia and episodes of confusion. She took ASA 81mg with improvement of symptoms; pt was able to ambulate and function at home but felt weak. On the day prior to admission, the patient fell prompting her partner to bring her to the ED.    On arrival, pt noted to have dense right sided facial droop and right sided hemiparesis and aphasia.  MRI brain demonstrated multiple small acute infarctions in the left MCA distribution and chronic ischemic changes from prior strokes. CTA head/neck: negative for LVO occlusion. Patient was evaluated by neurology, and recommended for ASA & plavix x 3 months.    Patient was evaluated by PM&R and therapy for functional deficits, gait/ADL impairments and acute rehabilitation was recommended. Patient was discharged to  [facility] on 3/4/24. (04 Mar 2024 13:15)      PAST MEDICAL & SURGICAL HISTORY:  HTN (hypertension)  DM (diabetes mellitus)  CVA (cerebrovascular accident)    No significant past surgical history      MEDICATIONS  (STANDING):  amLODIPine   Tablet 10 milliGRAM(s) Oral daily  AQUAPHOR (petrolatum Ointment) 1 Application(s) Topical three times a day  aspirin enteric coated 81 milliGRAM(s) Oral daily  atorvastatin 80 milliGRAM(s) Oral at bedtime  clopidogrel Tablet 75 milliGRAM(s) Oral daily  dextrose 5%. 1000 milliLiter(s) (50 mL/Hr) IV Continuous <Continuous>  dextrose 50% Injectable 12.5 Gram(s) IV Push once  dextrose 50% Injectable 25 Gram(s) IV Push once  donepezil 5 milliGRAM(s) Oral <User Schedule>  enoxaparin Injectable 30 milliGRAM(s) SubCutaneous <User Schedule>  gabapentin 200 milliGRAM(s) Oral at bedtime  glucagon  Injectable 1 milliGRAM(s) IntraMuscular once  lisinopril 20 milliGRAM(s) Oral daily  nitrofurantoin monohydrate/macrocrystals (MACROBID) 100 milliGRAM(s) Oral two times a day  nystatin    Suspension 538818 Unit(s) Oral four times a day  potassium chloride   Powder 20 milliEquivalent(s) Oral daily  senna 2 Tablet(s) Oral at bedtime  sodium chloride 0.65% Nasal 1 Spray(s) Both Nostrils three times a day    MEDICATIONS  (PRN):  acetaminophen     Tablet .. 650 milliGRAM(s) Oral every 6 hours PRN Temp greater or equal to 38C (100.4F), Mild Pain (1 - 3)  aluminum hydroxide/magnesium hydroxide/simethicone Suspension 30 milliLiter(s) Oral every 4 hours PRN Dyspepsia  melatonin 3 milliGRAM(s) Oral at bedtime PRN Insomnia  ondansetron   Disintegrating Tablet 4 milliGRAM(s) Oral every 8 hours PRN Nausea and/or Vomiting  polyethylene glycol 3350 17 Gram(s) Oral daily PRN Constipation      Allergies    Allergy Status Unknown    Intolerances          VITALS  63y  Vital Signs Last 24 Hrs  T(C): 36.4 (14 Mar 2024 07:36), Max: 36.4 (14 Mar 2024 07:36)  T(F): 97.5 (14 Mar 2024 07:36), Max: 97.5 (14 Mar 2024 07:36)  HR: 89 (14 Mar 2024 07:36) (82 - 99)  BP: 135/72 (14 Mar 2024 07:36) (129/72 - 153/83)  BP(mean): --  RR: 15 (14 Mar 2024 07:36) (15 - 16)  SpO2: 98% (14 Mar 2024 07:36) (97% - 98%)    Parameters below as of 14 Mar 2024 07:36  Patient On (Oxygen Delivery Method): room air        RECENT LABS:                          12.0   13.47 )-----------( 311      ( 14 Mar 2024 07:26 )             36.6     03-14    139  |  100  |  18  ----------------------------<  243<H>  3.9   |  28  |  0.72    Ca    9.6      14 Mar 2024 07:26  Mg     1.8     03-14    TPro  7.6  /  Alb  3.8  /  TBili  1.6<H>  /  DBili  x   /  AST  21  /  ALT  36  /  AlkPhos  63      Urinalysis + Microscopic Examination (24 @ 16:50)   pH Urine: 6.0  Urine Appearance: Clear  Color: Dark Yellow  Specific Gravity: 1.016  Protein, Urine: Trace mg/dL  Glucose Qualitative, Urine: 250 mg/dL  Ketone - Urine: Negative mg/dL  Blood, Urine: Negative  Bilirubin: Negative  Urobilinogen: 1.0 mg/dL  Leukocyte Esterase Concentration: Moderate  Nitrite: Positive  White Blood Cell - Urine: 2 /HPF  Red Blood Cell - Urine: 0 /HPF  Bacteria: Few /HPF  Squamous Epithelial Cells: 0    Urinalysis Basic - ( 12 Mar 2024 18:00 )  Color: Orange / Appearance: Clear / S.022 / pH: x  Gluc: x / Ketone: Negative mg/dL  / Bili: Small / Urobili: 1.0 mg/dL   Blood: x / Protein: Trace mg/dL / Nitrite: Positive   Leuk Esterase: Small / RBC: 0 /HPF / WBC 2 /HPF   Sq Epi: x / Non Sq Epi: x / Bacteria: Few /HPF

## 2024-03-14 NOTE — PROGRESS NOTE ADULT - SUBJECTIVE AND OBJECTIVE BOX
|-------------------------------------------|                       Subjective   |-------------------------------------------|    No events overnight  No new complaints to offer me    |------------------------------------------|                       Objective  |------------------------------------------|    Vital Signs Last 24 Hrs  T(F): 97.5 (14 Mar 2024 07:36), Max: 97.5 (14 Mar 2024 07:36)  HR: 89 (14 Mar 2024 07:36) (82 - 99)  BP: 135/72 (14 Mar 2024 07:36) (129/72 - 153/83)  RR: 15 (14 Mar 2024 07:36) (15 - 16)  SpO2: 98% (14 Mar 2024 07:36) (97% - 98%)  I&O's Summary    13 Mar 2024 07:01  -  14 Mar 2024 07:00  --------------------------------------------------------  IN: 0 mL / OUT: 1 mL / NET: -1 mL        Examination:   General: NAD, Comfortable  Pulm: CTA BL No Rhonchi Rales or wheezes  Neck: Supple, No JVD  CVS: RRR No rubs murmurs or gallops  Abdomen: Soft, Nontender, Nondistended; No masses or organomegally  Extremities: No calf tenderness, No pitting edema  Labs:                        12.0   13.47 )-----------( 311      ( 14 Mar 2024 07:26 )             36.6       03-14    139  |  100  |  18  ----------------------------<  243  3.9   |  28  |  0.72    Ca    9.6      14 Mar 2024 07:26  Mg     1.8     03-14    TPro  7.6  /  Alb  3.8  /  TBili  1.6  /  DBili  x   /  AST  21  /  ALT  36  /  AlkPhos  63  03-14         |-------------------------------------------------|                  Assessment and plan  |-------------------------------------------------|                 |-------------------------------------------|                       Subjective   |-------------------------------------------|    No events overnight  No new complaints to offer me    |------------------------------------------|                       Objective  |------------------------------------------|    Vital Signs Last 24 Hrs  T(F): 97.5 (14 Mar 2024 07:36), Max: 97.5 (14 Mar 2024 07:36)  HR: 89 (14 Mar 2024 07:36) (82 - 99)  BP: 135/72 (14 Mar 2024 07:36) (129/72 - 153/83)  RR: 15 (14 Mar 2024 07:36) (15 - 16)  SpO2: 98% (14 Mar 2024 07:36) (97% - 98%)  I&O's Summary    13 Mar 2024 07:01  -  14 Mar 2024 07:00  --------------------------------------------------------  IN: 0 mL / OUT: 1 mL / NET: -1 mL        Examination:   General: NAD, Comfortable  Pulm: CTA BL No Rhonchi Rales or wheezes  Neck: Supple, No JVD  CVS: RRR No rubs murmurs or gallops  Abdomen: Soft, Nontender, Nondistended; No masses or organomegally  Extremities: No calf tenderness, No pitting edema  Labs:                        12.0   13.47 )-----------( 311      ( 14 Mar 2024 07:26 )             36.6       03-14    139  |  100  |  18  ----------------------------<  243  3.9   |  28  |  0.72    Ca    9.6      14 Mar 2024 07:26  Mg     1.8     03-14    TPro  7.6  /  Alb  3.8  /  TBili  1.6  /  DBili  x   /  AST  21  /  ALT  36  /  AlkPhos  63  03-14         |-------------------------------------------------|                  Assessment and plan  |-------------------------------------------------|    63F with HTN, HLD, DM2, prior CVA, now in acute rehab after left ischemic MCA stroke    Ischemic MCA stroke  #Aphasic due to above  -c/w ASA and plavix  -c/w statin  -PT/OT/SLP  -c/w Aricept    #Essential HTN  On amlodipine 10  and [id number]  at inpatient goal. may titrate lisinopirl to 40 if needed      #DM2  A1C with Estimated Average Glucose Result: 5.5 % (03-02-24 @ 06:25)  at goal  #Leukocytosis   -thrush noted - on nystatin  Nitrited ua. started on bactrim Mar13  -LE dopplers neg for DVT (b/l) on 3/8

## 2024-03-14 NOTE — PROGRESS NOTE ADULT - ATTENDING COMMENTS
Progress note amended to include my discussions with patient, resident, hospitalist, RN, SW, PT and my findings    Patient seen in SLP. Was performing picture ID, was evaluated toward end of session. Accuracy varies, at times patient is impulsive and does not wait for instruction, mildly difficult to redirect. She is alert, does not appear fatigued, and is somewhat frustrated at times although less than yesterday. She continues to experience dysuria, which was confirmed on repeated questioning, WBC remains elevated 13.47 today. Given symptoms and +UA, leukocytosis, she was started on macrobid last evening. We discussed her urinalysis and start of antibiotics, to which she said "thank you" repeatedly. No fever    We evaluated her RUE +flexor tone MAS 2-3 especially in elbow. option of medication for increased tone/spasticity discussed, including robaxin and tizanidine to start. However, caregiver expressed concern as patient had some fatigue and drowsiness earlier in week , which could be worsened with new medication. Plan is to monitor, see response to treatment of UTI (in both spasticity and overall arousal and behavior), and consider revisiting if spasticity persists after Rx .    Continue current program. WC level mobility on dc given instability RLE and DME reviewed with partner as well    RECENT LABS    Vital Signs Last 24 Hrs  T(C): 36.4 (14 Mar 2024 07:36), Max: 36.4 (14 Mar 2024 07:36)  T(F): 97.5 (14 Mar 2024 07:36), Max: 97.5 (14 Mar 2024 07:36)  HR: 89 (14 Mar 2024 07:36) (82 - 99)  BP: 135/72 (14 Mar 2024 07:36) (129/72 - 153/83)  BP(mean): --  RR: 15 (14 Mar 2024 07:36) (15 - 16)  SpO2: 98% (14 Mar 2024 07:36) (97% - 98%)    Parameters below as of 14 Mar 2024 07:36  Patient On (Oxygen Delivery Method): room air                              12.0   13.47 )-----------( 311      ( 14 Mar 2024 07:26 )             36.6     03-14    139  |  100  |  18  ----------------------------<  243<H>  3.9   |  28  |  0.72    Ca    9.6      14 Mar 2024 07:26  Mg     1.8     03-14    TPro  7.6  /  Alb  3.8  /  TBili  1.6<H>  /  DBili  x   /  AST  21  /  ALT  36  /  AlkPhos  63  03-14      Urinalysis Basic - ( 14 Mar 2024 07:26 )    Color: x / Appearance: x / SG: x / pH: x  Gluc: 243 mg/dL / Ketone: x  / Bili: x / Urobili: x   Blood: x / Protein: x / Nitrite: x   Leuk Esterase: x / RBC: x / WBC x   Sq Epi: x / Non Sq Epi: x / Bacteria: x      CAPILLARY BLOOD GLUCOSE

## 2024-03-14 NOTE — PROGRESS NOTE ADULT - ASSESSMENT
62 YO female with PMH HTN, HLD, DM2, CVA (10/2023), who presented to [facility] ED on 3/1/24 with right sided facial, right arm and leg weakness, aphasia and episodes of confusion as well as a fall. MRI brain demonstrated multiple small acute infarctions in the left MCA distribution     #Left MCA CVA with expressive aphasia, right hemiparesis, and right facial droop  - MRI 3/1: Multiple foci of diffusion restriction scattered throughout the left MCA vascular distribution, compatible with acute infarctions. Chronic lacunar infarctions in the right centrum semiovale/corona radiata and left hemipons. Encephalomalacia/gliotic change in the anterior right temporal lobe. Foci of susceptibility artifact in the right basal ganglia, likely sequela of prior micro-hemorrhages.  - ASA & plavix for 3 months, Discontinue ASA after 3 months (6/1/24)  - High dose statin  - Continue Aricept 5 mg Q8AM 3/9. Receptive language appears improved, but will consider dc if restlessness/agitation persists  - Continue Comprehensive Rehab Program: PT/OT/ST, 3hours daily and 5 days weekly  - right elbow comfy splint to improve extension. Patient does not tolerate right resting hand splint, will request OT to re-eval fit  - will benefit from AFO for right ankle DF assist and knee stabilization on dc  - recreation therapy  - Precautions: fall, aspiration, cardiac, DM, AMS    # dysphonia  - ENT 3/7:  eval VC,  difficulty tolerating laryngoscopy through nose, (+)dried nasal crusting, tolerated laryngoscopy through mouth, vocal cords mobile bilaterally, no pooling, no lesions  - Nasal saline spray TID    # HTN  - Amlodipine 10 mg daily (increased on 3/7)  - Lisinopril 20mg daily  - BP (129/72 - 153/83) 3/13    # HLD  - Lipitor 80mg daily    # Diabetes Mellitus Type 2 without complications  - A1c 5.5 on 3/2/24  - FS and ISS discontinued on 3/6    # leukocytosis  - dysuria  - UA negative 3/10.  - Repeat UA 3/12: +nit sm LE WBC 2. Patient is symptomatic, dysuria, frequency, also more agitated. Medicine f/u re: management appreciated. Will repeat UA and obtain Cx at same time. Hold Abx for now  - check bladder scan x 1 r/o retention  - WBC 13.5 3/11--> 10.87 3/12. CBC 3/14    # hypokalemia  - KCl 20 meq daily re-started 3/7, home medication   - K+  4.2 3/11, resolved   - BMP 3/14    # hypomagnesemia  - Mg oxide supplemented   - Mg++ 1.5-->2.0 3/11    # Pain management  - Tylenol PRN  - gabapentin 200 mg QHS 3/4   - controlled    # Bowel Regimen  - Senna  - miralax PRN    # Sleep:   - Melatonin 3mg nightly PRN    # FEN   - Diet: upgraded to minced/moist and thin liquids 3/5    # DVT ppx  - Bilateral LE duplex (3/8) NEGATIVE DVT  - Lovenox    # Case discussed in IDT rounds 3/13 (follow up):  - mod assist transfers, ambulates 70 feet wiht HHA and max assist ACE wrap DF assist right ankle, min assist-CG bed mobility, min assist transfers, emotionally labile, improved yes/no reliability, set up/supervision eating and grooming, mod assist LB dressing, mod assist toilet and shower trnasfers  - adjusted goals: supervision due to cog-language deficits, CG transfers from wheelchair level including to toilet, CG assist bADLs, WC level mobility on dc. Will need home modifications for WC including potential first floor set up, team to start with partner  - adjusted target: dc home 3/26 with caregiver support and home care referral PT OT SLP  - caregiver training    # LABS  UA/urine Cx  bladder scan  CBC CMP 3/14    #GOC  CODE STATUS: FULL CODE      Outpatient Follow-up (Specialty/Name of physician):    Jacobo Miramontes  Physical/Rehab Medicine  [address] [place] [address]  Phone: [phone]  Fax: [phone]  Follow Up Time:       64 YO female with PMH HTN, HLD, DM2, CVA (10/2023), who presented to [facility] ED on 3/1/24 with right sided facial, right arm and leg weakness, aphasia and episodes of confusion as well as a fall. MRI brain demonstrated multiple small acute infarctions in the left MCA distribution     #Left MCA CVA with expressive aphasia, right hemiparesis, and right facial droop  - MRI 3/1: Multiple foci of diffusion restriction scattered throughout the left MCA vascular distribution, compatible with acute infarctions. Chronic lacunar infarctions in the right centrum semiovale/corona radiata and left hemipons. Encephalomalacia/gliotic change in the anterior right temporal lobe. Foci of susceptibility artifact in the right basal ganglia, likely sequela of prior micro-hemorrhages.  - ASA & plavix for 3 months, Discontinue ASA after 3 months (6/1/24)  - High dose statin  - Continue Aricept 5 mg Q8AM 3/9. Receptive language appears improved, but will consider dc if restlessness/agitation persists  - Continue Comprehensive Rehab Program: PT/OT/ST, 3hours daily and 5 days weekly  - right elbow comfy splint to improve extension. Patient does not tolerate right resting hand splint, will request OT to re-eval fit  - will benefit from AFO for right ankle DF assist and knee stabilization on dc  - continue to monitor RUE spasticity and consider initiation of tizanidine, patient's partner defers at this time  - recreation therapy  - Precautions: fall, aspiration, cardiac, DM, AMS    # dysphonia  - ENT 3/7:  eval VC,  difficulty tolerating laryngoscopy through nose, (+)dried nasal crusting, tolerated laryngoscopy through mouth, vocal cords mobile bilaterally, no pooling, no lesions  - Nasal saline spray TID    # HTN  - Amlodipine 10 mg daily (increased on 3/7)  - Lisinopril 20mg daily  - BP (129/72 - 153/83) 3/13    # HLD  - Lipitor 80mg daily    # Diabetes Mellitus Type 2 without complications  - A1c 5.5 on 3/2/24  - FS and ISS discontinued on 3/6    # leukocytosis  - dysuria  - UA negative 3/10.  - Repeat UA {3/12): +nit sm LE WBC 2. Patient is symptomatic, dysuria, frequency, also more agitated.   - Repeat UA (3/13) now with moderate LE and positive nitrite   - bladder scan with no signs of retention  - WBC 13.5 3/11--> 10.87 3/12 --> 13.47 3/14  - continue Macrobid 100 mg BID x 5 days (started 3/13 PM)  - f/u urine cx    # hypokalemia  - KCl 20 meq daily re-started 3/7, home medication   - K+  4.2 3/11 and 3.9 on 3/14, resolved   - BMP 3/18    # hypomagnesemia  - Mg oxide supplemented   - Mg++ 1.5-->2.0 3/11    # Pain management  - Tylenol PRN  - gabapentin 200 mg QHS 3/4   - controlled    # Bowel Regimen  - Senna  - Miralax PRN    # Sleep:   - Melatonin 3mg nightly PRN    # FEN   - Diet: upgraded to minced/moist and thin liquids 3/5    # DVT ppx  - Bilateral LE duplex (3/8) NEGATIVE DVT  - Lovenox    # Case discussed in IDT rounds 3/13 (follow up):  - mod assist transfers, ambulates 70 feet wiht HHA and max assist ACE wrap DF assist right ankle, min assist-CG bed mobility, min assist transfers, emotionally labile, improved yes/no reliability, set up/supervision eating and grooming, mod assist LB dressing, mod assist toilet and shower trnasfers  - adjusted goals: supervision due to cog-language deficits, CG transfers from wheelchair level including to toilet, CG assist bADLs, WC level mobility on dc. Will need home modifications for WC including potential first floor set up, team to start with partner  - adjusted target: dc home 3/26 with caregiver support and home care referral PT OT SLP  - caregiver training    # LABS  urine Cx  CBC CMP 3/18    #GOC  CODE STATUS: FULL CODE      Outpatient Follow-up (Specialty/Name of physician):    Jacobo Miramontes  Physical/Rehab Medicine  [address] [place] [id number]  Phone: [phone]  Fax: [phone]  Follow Up Time:       64 YO female with PMH HTN, HLD, DM2, CVA (10/2023), who presented to [facility] ED on 3/1/24 with right sided facial, right arm and leg weakness, aphasia and episodes of confusion as well as a fall. MRI brain demonstrated multiple small acute infarctions in the left MCA distribution     #Left MCA CVA with expressive aphasia, right hemiparesis, and right facial droop  - MRI 3/1: Multiple foci of diffusion restriction scattered throughout the left MCA vascular distribution, compatible with acute infarctions. Chronic lacunar infarctions in the right centrum semiovale/corona radiata and left hemipons. Encephalomalacia/gliotic change in the anterior right temporal lobe. Foci of susceptibility artifact in the right basal ganglia, likely sequela of prior micro-hemorrhages.  - ASA & plavix for 3 months, Discontinue ASA after 3 months (6/1/24)  - High dose statin  - Continue Aricept 5 mg Q8AM 3/9  - Continue Comprehensive Rehab Program: PT/OT/ST, 3hours daily and 5 days weekly  - right elbow comfy splint to improve extension. Patient does not tolerate right resting hand splint, will request OT to re-eval fit  - will benefit from AFO for right ankle DF assist and knee stabilization on dc  - continue to monitor RUE spasticity and consider initiation of tizanidine, patient's partner defers at this time due to concern of sedation  - recreation therapy  - Precautions: fall, aspiration, cardiac, DM, AMS    # dysphonia  - ENT 3/7:  eval VC,  difficulty tolerating laryngoscopy through nose, (+)dried nasal crusting, tolerated laryngoscopy through mouth, vocal cords mobile bilaterally, no pooling, no lesions  - Nasal saline spray TID    # HTN  - Amlodipine 10 mg daily (increased on 3/7)  - Lisinopril 20mg daily    # HLD  - Lipitor 80mg daily    # Diabetes Mellitus Type 2 without complications  - A1c 5.5 on 3/2/24  - FS and ISS discontinued on 3/6    # leukocytosis  - dysuria  - UA negative 3/10.  - Repeat UA {3/12): +nit sm LE WBC 2. Patient is symptomatic, dysuria, frequency, also more agitated.   - Repeat UA (3/13) now with moderate LE and positive nitrite   - bladder scan with no signs of retention  - WBC 13.5 3/11--> 10.87 3/12 --> 13.47 3/14  - Initiated Macrobid 100 mg BID x 5 days (started 3/13 PM). Discussed with patient and partner  - f/u urine cx    # hypokalemia  - KCl 20 meq daily re-started 3/7, home medication   - K+  4.2 3/11 and 3.9 on 3/14, resolved   - BMP 3/18    # hypomagnesemia  - Mg oxide supplemented   - Mg++ 1.5-->2.0 3/11    # Pain management  - Tylenol PRN  - gabapentin 200 mg QHS 3/4   - controlled    # Bowel Regimen  - Senna  - Miralax PRN    # Sleep:   - Melatonin 3mg nightly PRN    # FEN   - Diet: upgraded to minced/moist and thin liquids 3/5    # DVT ppx  - Bilateral LE duplex (3/8) NEGATIVE DVT  - Lovenox    # Case discussed in IDT rounds 3/13 (follow up):  - mod assist transfers, ambulates 70 feet wiht HHA and max assist ACE wrap DF assist right ankle, min assist-CG bed mobility, min assist transfers, emotionally labile, improved yes/no reliability, set up/supervision eating and grooming, mod assist LB dressing, mod assist toilet and shower trnasfers  - adjusted goals: supervision due to cog-language deficits, CG transfers from wheelchair level including to toilet, CG assist bADLs, WC level mobility on dc. Will need home modifications for WC including potential first floor set up, team to start with partner  - adjusted target: dc home 3/26 with caregiver support and home care referral PT OT SLP  - caregiver training    # LABS  urine Cx  CBC CMP 3/18    #GOC  CODE STATUS: FULL CODE      Outpatient Follow-up (Specialty/Name of physician):    Jacobo Miramontes  Physical/Rehab Medicine  [address] [place] [address]  Phone: [phone]  Fax: [phone]  Follow Up Time:

## 2024-03-15 LAB
HCT VFR BLD CALC: 33.2 % — LOW (ref 34.5–45)
HGB BLD-MCNC: 11 G/DL — LOW (ref 11.5–15.5)
MCHC RBC-ENTMCNC: 29.3 PG — SIGNIFICANT CHANGE UP (ref 27–34)
MCHC RBC-ENTMCNC: 33.1 GM/DL — SIGNIFICANT CHANGE UP (ref 32–36)
MCV RBC AUTO: 88.3 FL — SIGNIFICANT CHANGE UP (ref 80–100)
NRBC # BLD: 0 /100 WBCS — SIGNIFICANT CHANGE UP (ref 0–0)
PLATELET # BLD AUTO: 313 K/UL — SIGNIFICANT CHANGE UP (ref 150–400)
RBC # BLD: 3.76 M/UL — LOW (ref 3.8–5.2)
RBC # FLD: 11.9 % — SIGNIFICANT CHANGE UP (ref 10.3–14.5)
WBC # BLD: 12.3 K/UL — HIGH (ref 3.8–10.5)
WBC # FLD AUTO: 12.3 K/UL — HIGH (ref 3.8–10.5)

## 2024-03-15 PROCEDURE — 99232 SBSQ HOSP IP/OBS MODERATE 35: CPT

## 2024-03-15 RX ORDER — FLUCONAZOLE 150 MG/1
200 TABLET ORAL DAILY
Refills: 0 | Status: DISCONTINUED | OUTPATIENT
Start: 2024-03-15 | End: 2024-03-15

## 2024-03-15 RX ORDER — FLUCONAZOLE 150 MG/1
100 TABLET ORAL DAILY
Refills: 0 | Status: DISCONTINUED | OUTPATIENT
Start: 2024-03-16 | End: 2024-03-18

## 2024-03-15 RX ORDER — POLYETHYLENE GLYCOL 3350 17 G/17G
17 POWDER, FOR SOLUTION ORAL DAILY
Refills: 0 | Status: DISCONTINUED | OUTPATIENT
Start: 2024-03-15 | End: 2024-03-18

## 2024-03-15 RX ORDER — NITROFURANTOIN MACROCRYSTAL 50 MG
100 CAPSULE ORAL
Refills: 0 | Status: COMPLETED | OUTPATIENT
Start: 2024-03-15 | End: 2024-03-16

## 2024-03-15 RX ORDER — FLUCONAZOLE 150 MG/1
200 TABLET ORAL ONCE
Refills: 0 | Status: COMPLETED | OUTPATIENT
Start: 2024-03-15 | End: 2024-03-15

## 2024-03-15 RX ADMIN — DONEPEZIL HYDROCHLORIDE 5 MILLIGRAM(S): 10 TABLET, FILM COATED ORAL at 08:34

## 2024-03-15 RX ADMIN — ATORVASTATIN CALCIUM 80 MILLIGRAM(S): 80 TABLET, FILM COATED ORAL at 21:00

## 2024-03-15 RX ADMIN — Medication 1 SPRAY(S): at 11:39

## 2024-03-15 RX ADMIN — GABAPENTIN 200 MILLIGRAM(S): 400 CAPSULE ORAL at 21:01

## 2024-03-15 RX ADMIN — SENNA PLUS 2 TABLET(S): 8.6 TABLET ORAL at 21:00

## 2024-03-15 RX ADMIN — POLYETHYLENE GLYCOL 3350 17 GRAM(S): 17 POWDER, FOR SOLUTION ORAL at 11:39

## 2024-03-15 RX ADMIN — Medication 500000 UNIT(S): at 05:23

## 2024-03-15 RX ADMIN — ENOXAPARIN SODIUM 30 MILLIGRAM(S): 100 INJECTION SUBCUTANEOUS at 21:01

## 2024-03-15 RX ADMIN — Medication 1 APPLICATION(S): at 17:27

## 2024-03-15 RX ADMIN — Medication 1 APPLICATION(S): at 21:48

## 2024-03-15 RX ADMIN — Medication 1 SPRAY(S): at 05:21

## 2024-03-15 RX ADMIN — LISINOPRIL 20 MILLIGRAM(S): 2.5 TABLET ORAL at 05:22

## 2024-03-15 RX ADMIN — CLOPIDOGREL BISULFATE 75 MILLIGRAM(S): 75 TABLET, FILM COATED ORAL at 11:34

## 2024-03-15 RX ADMIN — FLUCONAZOLE 200 MILLIGRAM(S): 150 TABLET ORAL at 11:34

## 2024-03-15 RX ADMIN — AMLODIPINE BESYLATE 10 MILLIGRAM(S): 2.5 TABLET ORAL at 05:22

## 2024-03-15 RX ADMIN — Medication 100 MILLIGRAM(S): at 17:33

## 2024-03-15 RX ADMIN — Medication 1 SPRAY(S): at 21:09

## 2024-03-15 RX ADMIN — Medication 81 MILLIGRAM(S): at 11:34

## 2024-03-15 RX ADMIN — Medication 100 MILLIGRAM(S): at 05:22

## 2024-03-15 RX ADMIN — Medication 1 APPLICATION(S): at 05:23

## 2024-03-15 RX ADMIN — Medication 20 MILLIEQUIVALENT(S): at 11:35

## 2024-03-15 NOTE — PROGRESS NOTE ADULT - ASSESSMENT
64 YO female with PMH HTN, HLD, DM2, CVA (10/2023), who presented to [facility] ED on 3/1/24 with right sided facial, right arm and leg weakness, aphasia and episodes of confusion as well as a fall. MRI brain demonstrated multiple small acute infarctions in the left MCA distribution     #Left MCA CVA with expressive aphasia, right hemiparesis, and right facial droop  - MRI 3/1: Multiple foci of diffusion restriction scattered throughout the left MCA vascular distribution, compatible with acute infarctions. Chronic lacunar infarctions in the right centrum semiovale/corona radiata and left hemipons. Encephalomalacia/gliotic change in the anterior right temporal lobe. Foci of susceptibility artifact in the right basal ganglia, likely sequela of prior micro-hemorrhages.  - ASA & plavix for 3 months, Discontinue ASA after 3 months (6/1/24)  - High dose statin  - Continue Aricept 5 mg Q8AM 3/9  - Continue Comprehensive Rehab Program: PT/OT/ST, 3hours daily and 5 days weekly  - right elbow comfy splint to improve extension. Patient does not tolerate right resting hand splint, will request OT to re-eval fit  - will benefit from AFO for right ankle DF assist and knee stabilization on dc  - continue to monitor RUE spasticity and consider initiation of tizanidine, patient's partner defers at this time due to concern of sedation  - recreation therapy  - Precautions: fall, aspiration, cardiac, DM, AMS    # dysphonia  - ENT 3/7: eval VC, difficulty tolerating laryngoscopy through nose, (+)dried nasal crusting, tolerated laryngoscopy through mouth, vocal cords mobile bilaterally, no pooling, no lesions  - Nasal saline spray TID    # HTN  - Amlodipine 10 mg daily (increased on 3/7)  - Lisinopril 20mg daily    # HLD  - Lipitor 80mg daily    # Diabetes Mellitus Type 2 without complications  - A1c 5.5 on 3/2/24  - FS and ISS discontinued on 3/6    # leukocytosis  - dysuria  - UA negative 3/10.  - Repeat UA {3/12): +nit sm LE WBC 2. Patient is symptomatic, dysuria, frequency, also more agitated.   - Repeat UA (3/13) now with moderate LE and positive nitrite   - Urine cx (3/13): normal urogenital chelsi  - bladder scan with no signs of retention  - WBC 13.5 3/11--> 10.87 3/12 --> 13.47 3/14 --> pending repeat 3/15  - Initiated Macrobid 100 mg BID (started 3/13 PM). Discussed with hospitalist, as patient symptomatic but urine cx negative, will treat for 3 days (last dose 3/16 AM.     #oral thrush  - not improved with nystatin suspension (3/5 - 3/15) --> d/c  - start diflucan 200 mg x1, then 100 mg QD for 7 days    # hypokalemia  - KCl 20 meq daily re-started 3/7, home medication   - K+  4.2 3/11 and 3.9 on 3/14, resolved   - BMP 3/18    # hypomagnesemia  - Mg oxide supplemented   - Mg++ 1.5-->2.0 3/11    # Pain management  - Tylenol PRN  - gabapentin 200 mg QHS 3/4   - controlled    # Bowel Regimen  - Senna QHS  - Miralax PRN --> has not been receiving although patient endorsing difficulty passing stool/hard stool. Will change to scheduled daily  - reports good relief with prune juice in past, dietary aware to add to tray    # Sleep:   - Melatonin 3mg nightly PRN    # FEN   - Diet: upgraded to minced/moist and thin liquids 3/5    # DVT ppx  - Bilateral LE duplex (3/8) NEGATIVE DVT  - Lovenox    # Case discussed in IDT rounds 3/13 (follow up):  - mod assist transfers, ambulates 70 feet wiht HHA and max assist ACE wrap DF assist right ankle, min assist-CG bed mobility, min assist transfers, emotionally labile, improved yes/no reliability, set up/supervision eating and grooming, mod assist LB dressing, mod assist toilet and shower trnasfers  - adjusted goals: supervision due to cog-language deficits, CG transfers from wheelchair level including to toilet, CG assist bADLs, WC level mobility on dc. Will need home modifications for WC including potential first floor set up, team to start with partner  - adjusted target: dc home 3/26 with caregiver support and home care referral PT OT SLP  - caregiver training    # LABS  CBC 3/15  CBC CMP 3/18    #GOC  CODE STATUS: FULL CODE      Outpatient Follow-up (Specialty/Name of physician):    Jacobo Miramontes  Physical/Rehab Medicine  [address] [place] [id number]  Phone: [phone]  Fax: [phone]  Follow Up Time:       62 YO female with PMH HTN, HLD, DM2, CVA (10/2023), who presented to [facility] ED on 3/1/24 with right sided facial, right arm and leg weakness, aphasia and episodes of confusion as well as a fall. MRI brain demonstrated multiple small acute infarctions in the left MCA distribution     #Left MCA CVA with expressive aphasia, right hemiparesis, and right facial droop  - MRI 3/1: Multiple foci of diffusion restriction scattered throughout the left MCA vascular distribution, compatible with acute infarctions. Chronic lacunar infarctions in the right centrum semiovale/corona radiata and left hemipons. Encephalomalacia/gliotic change in the anterior right temporal lobe. Foci of susceptibility artifact in the right basal ganglia, likely sequela of prior micro-hemorrhages.  - ASA & plavix for 3 months, Discontinue ASA after 3 months (6/1/24)  - High dose statin  - Continue Aricept 5 mg Q8AM 3/9  - Continue Comprehensive Rehab Program: PT/OT/ST, 3hours daily and 5 days weekly  - right elbow comfy splint to improve extension. Patient does not tolerate right resting hand splint, will request OT to re-eval fit  - will benefit from AFO for right ankle DF assist and knee stabilization on dc  - continue to monitor RUE spasticity. Patient's partner defers start of antispasticity medication at this time due to concern of sedation  - recreation therapy  - Precautions: fall, aspiration, cardiac, DM, AMS    # dysphonia  - ENT 3/7: eval VC, difficulty tolerating laryngoscopy through nose, (+)dried nasal crusting, tolerated laryngoscopy through mouth, vocal cords mobile bilaterally, no pooling, no lesions  - Nasal saline spray TID    # HTN  - Amlodipine 10 mg daily  - Lisinopril 20mg daily  - BP (111/73 - 161/76) 3/15. Isolated elevation last evening at 8 PM; will monitor, see if correlates with constipation/dysuria    # HLD  - Lipitor 80mg daily    # Diabetes Mellitus Type 2 without complications  - A1c 5.5 on 3/2/24  - FS and ISS discontinued on 3/6    # leukocytosis  - dysuria  - UA negative 3/10.  - Repeat UA {3/12): +nit sm LE WBC 2. Patient is symptomatic, dysuria, frequency, also more agitated.   - Repeat UA (3/13) now with moderate LE and positive nitrite   - Urine cx (3/13): normal urogenital chelsi  - bladder scan with no signs of retention  - WBC 13.5 3/11--> 10.87 3/12 --> 13.47 3/14 --> 12.30 3/15  - Initiated Macrobid 100 mg BID (started 3/13 PM). Discussed with hospitalist, as patient symptomatic with leukocytosis but urine cx negative, will treat for 3 days (last dose 3/16 AM).     # oral thrush  - not improved with nystatin suspension (3/5 - 3/15) --> d/c  - start diflucan 200 mg x1, then 100 mg QD for 7 days  - continue oral care. Discussed with SLP, patient and caregiver    # hypokalemia  - KCl 20 meq daily re-started 3/7, home medication   - K+  4.2 3/11 --> 3.9 on 3/14, resolved   - BMP 3/18    # hypomagnesemia  - Mg oxide supplemented   - Mg++ 1.5-->2.0 3/11    # Pain management  - Tylenol PRN  - gabapentin 200 mg QHS 3/4   - controlled    # Bowel Regimen  - Senna QHS  - Miralax PRN. Has not been receiving although patient endorsing difficulty passing stool/hard stool. Will change to scheduled daily  - reports good relief with prune juice in past, dietary aware to add to tray    # Sleep:   - Melatonin 3mg nightly PRN    # FEN   - Diet: upgraded to minced/moist and thin liquids 3/5    # DVT ppx  - Bilateral LE duplex (3/8) NEGATIVE DVT  - Lovenox    # Case discussed in IDT rounds 3/13 (follow up):  - mod assist transfers, ambulates 70 feet wiht HHA and max assist ACE wrap DF assist right ankle, min assist-CG bed mobility, min assist transfers, emotionally labile, improved yes/no reliability, set up/supervision eating and grooming, mod assist LB dressing, mod assist toilet and shower trnasfers  - adjusted goals: supervision due to cog-language deficits, CG transfers from wheelchair level including to toilet, CG assist bADLs, WC level mobility on dc. Will need home modifications for WC including potential first floor set up, team to start with partner  - adjusted target: dc home 3/26 with caregiver support and home care referral PT OT SLP  - caregiver training    # LABS  CBC CMP 3/18    #GOC  CODE STATUS: FULL CODE      Outpatient Follow-up (Specialty/Name of physician):    Jacobo Miramontes  Physical/Rehab Medicine  [address] [place] [id number]  Phone: [phone]  Fax: [phone]  Follow Up Time:

## 2024-03-15 NOTE — PROGRESS NOTE ADULT - COMMENTS
Patient seen with SLP. Partner also attending. She was able to answer yes/no questions through verbal response and pointing to word, although inconsistent at times. Endorses pain with urination. Patient and partner made aware that urine culture was negative, but since patient having symptoms will complete 3 day course. Partner also believes constipation may play a role. Patient has been having BM but reports hard stool and difficulty passing. Reports good response with prune juice in past. Patient seen with SLP. Partner also attending. She was able to answer yes/no questions through verbal response and pointing to word, although inconsistent at times with both forms. Endorses pain with urination. Patient and partner made aware that urine culture was negative, but since patient having symptoms and leukocytosis will complete 3 day course. Partner also believes constipation may play a role. Patient has been having BM but reports hard stool and difficulty passing. Reports good response with prune juice in past.

## 2024-03-15 NOTE — PROGRESS NOTE ADULT - MENTAL STATUS
alert, answering yes/no questions with improved consistency alert, answering yes/no questions with improved consistency although has issues with perseveration in both spoken and visual formats None

## 2024-03-15 NOTE — PROGRESS NOTE ADULT - NEUROLOGICAL COMMENTS
increased tone in right biceps and finger flexors
decreased tone in right biceps today but increased tone in triceps toward end range

## 2024-03-15 NOTE — PROGRESS NOTE ADULT - SUBJECTIVE AND OBJECTIVE BOX
Patient is a 63y old  Female who presents with a chief complaint of L MCA CVA with expressive aphasia, right hemiparesis, and right facial droop (12 Mar 2024 13:20)    HPI:  This ia a 62 YO female RH dominant with PMH HTN, HLD, DM2, CVA (10/2023), who presented to  [facility] ED on 3/1/24  following a fall at home. According to the partner, Connie, the patient was in her usual state of health up until 5 days prior to admission when she was noted to have right sided facial, arm and leg weakness, aphasia and episodes of confusion. She took ASA 81mg with improvement of symptoms; pt was able to ambulate and function at home but felt weak. On the day prior to admission, the patient fell prompting her partner to bring her to the ED.    On arrival, pt noted to have dense right sided facial droop and right sided hemiparesis and aphasia.  MRI brain demonstrated multiple small acute infarctions in the left MCA distribution and chronic ischemic changes from prior strokes. CTA head/neck: negative for LVO occlusion. Patient was evaluated by neurology, and recommended for ASA & plavix x 3 months.    Patient was evaluated by PM&R and therapy for functional deficits, gait/ADL impairments and acute rehabilitation was recommended. Patient was discharged to  [facility] on 3/4/24. (04 Mar 2024 13:15)      PAST MEDICAL & SURGICAL HISTORY:  HTN (hypertension)  DM (diabetes mellitus)  CVA (cerebrovascular accident)    No significant past surgical history      MEDICATIONS  (STANDING):  amLODIPine   Tablet 10 milliGRAM(s) Oral daily  AQUAPHOR (petrolatum Ointment) 1 Application(s) Topical three times a day  aspirin enteric coated 81 milliGRAM(s) Oral daily  atorvastatin 80 milliGRAM(s) Oral at bedtime  clopidogrel Tablet 75 milliGRAM(s) Oral daily  dextrose 5%. 1000 milliLiter(s) (50 mL/Hr) IV Continuous <Continuous>  dextrose 50% Injectable 12.5 Gram(s) IV Push once  dextrose 50% Injectable 25 Gram(s) IV Push once  donepezil 5 milliGRAM(s) Oral <User Schedule>  enoxaparin Injectable 30 milliGRAM(s) SubCutaneous <User Schedule>  gabapentin 200 milliGRAM(s) Oral at bedtime  glucagon  Injectable 1 milliGRAM(s) IntraMuscular once  lisinopril 20 milliGRAM(s) Oral daily  nitrofurantoin monohydrate/macrocrystals (MACROBID) 100 milliGRAM(s) Oral two times a day  nystatin    Suspension 234277 Unit(s) Oral four times a day  potassium chloride   Powder 20 milliEquivalent(s) Oral daily  senna 2 Tablet(s) Oral at bedtime  sodium chloride 0.65% Nasal 1 Spray(s) Both Nostrils three times a day    MEDICATIONS  (PRN):  acetaminophen     Tablet .. 650 milliGRAM(s) Oral every 6 hours PRN Temp greater or equal to 38C (100.4F), Mild Pain (1 - 3)  aluminum hydroxide/magnesium hydroxide/simethicone Suspension 30 milliLiter(s) Oral every 4 hours PRN Dyspepsia  melatonin 3 milliGRAM(s) Oral at bedtime PRN Insomnia  ondansetron   Disintegrating Tablet 4 milliGRAM(s) Oral every 8 hours PRN Nausea and/or Vomiting  polyethylene glycol 3350 17 Gram(s) Oral daily PRN Constipation      Allergies    Allergy Status Unknown    Intolerances          VITALS  63y  Vital Signs Last 24 Hrs  T(C): 36.6 (15 Mar 2024 08:51), Max: 36.6 (15 Mar 2024 08:51)  T(F): 97.8 (15 Mar 2024 08:51), Max: 97.8 (15 Mar 2024 08:51)  HR: 80 (15 Mar 2024 08:51) (80 - 81)  BP: 111/73 (15 Mar 2024 08:51) (111/73 - 161/76)  BP(mean): --  RR: 16 (15 Mar 2024 08:51) (16 - 16)  SpO2: 97% (15 Mar 2024 08:51) (97% - 100%)    Parameters below as of 15 Mar 2024 08:51  Patient On (Oxygen Delivery Method): room air      RECENT LABS:                          12.0   13.47 )-----------( 311      ( 14 Mar 2024 07:26 )             36.6     03-14    139  |  100  |  18  ----------------------------<  243<H>  3.9   |  28  |  0.72    Ca    9.6      14 Mar 2024 07:26  Mg     1.8     03-14    TPro  7.6  /  Alb  3.8  /  TBili  1.6<H>  /  DBili  x   /  AST  21  /  ALT  36  /  AlkPhos  63      Urinalysis + Microscopic Examination (24 @ 16:50)   pH Urine: 6.0  Urine Appearance: Clear  Color: Dark Yellow  Specific Gravity: 1.016  Protein, Urine: Trace mg/dL  Glucose Qualitative, Urine: 250 mg/dL  Ketone - Urine: Negative mg/dL  Blood, Urine: Negative  Bilirubin: Negative  Urobilinogen: 1.0 mg/dL  Leukocyte Esterase Concentration: Moderate  Nitrite: Positive  White Blood Cell - Urine: 2 /HPF  Red Blood Cell - Urine: 0 /HPF  Bacteria: Few /HPF  Squamous Epithelial Cells: 0    Urinalysis Basic - ( 12 Mar 2024 18:00 )  Color: Orange / Appearance: Clear / S.022 / pH: x  Gluc: x / Ketone: Negative mg/dL  / Bili: Small / Urobili: 1.0 mg/dL   Blood: x / Protein: Trace mg/dL / Nitrite: Positive   Leuk Esterase: Small / RBC: 0 /HPF / WBC 2 /HPF   Sq Epi: x / Non Sq Epi: x / Bacteria: Few /HPF

## 2024-03-15 NOTE — PROGRESS NOTE ADULT - ATTENDING COMMENTS
Progress note amended to include my discussions with patient, resident, hospitalist, RN, SLP, family    Patient seen in SLP. Was performing yes/no responses verbally and with gestures/ yes-no board. At times, her head gestures and verbalization did not match up, or her verbalization/use of board. She could at times correct with repetition of question, but overall accuracy is improved although not reliable. She had some straining yesterday, but partner states may be combination of both urine and constipation (had constipation at home as well, using prune juice with some benefit) Miralax increased daily standing    Urine Cx returned <10k normal chelsi. She has leukocytosis however, and persistent dysuria; no fever. WBC has downtrended with start of macrobid. Discussed with hospitalist and will continue 3 days course. Patient and family agreeable.    Vitals reviewed, isolated episode .     RECENT LABS    Vital Signs Last 24 Hrs  T(C): 36.6 (15 Mar 2024 08:51), Max: 36.6 (15 Mar 2024 08:51)  T(F): 97.8 (15 Mar 2024 08:51), Max: 97.8 (15 Mar 2024 08:51)  HR: 80 (15 Mar 2024 08:51) (80 - 81)  BP: 111/73 (15 Mar 2024 08:51) (111/73 - 161/76)  BP(mean): --  RR: 16 (15 Mar 2024 08:51) (16 - 16)  SpO2: 97% (15 Mar 2024 08:51) (97% - 100%)    Parameters below as of 15 Mar 2024 08:51  Patient On (Oxygen Delivery Method): room air                              11.0   12.30 )-----------( 313      ( 15 Mar 2024 11:46 )             33.2     03-14    139  |  100  |  18  ----------------------------<  243<H>  3.9   |  28  |  0.72    Ca    9.6      14 Mar 2024 07:26  Mg     1.8     03-14    TPro  7.6  /  Alb  3.8  /  TBili  1.6<H>  /  DBili  x   /  AST  21  /  ALT  36  /  AlkPhos  63  03-14      Culture - Urine (collected 13 Mar 2024 17:50)  Source: Clean Catch Clean Catch (Midstream)  Final Report (14 Mar 2024 19:49):    <10,000 CFU/mL Normal Urogenital Chelsi      Urinalysis Basic - ( 14 Mar 2024 07:26 )    Color: x / Appearance: x / SG: x / pH: x  Gluc: 243 mg/dL / Ketone: x  / Bili: x / Urobili: x   Blood: x / Protein: x / Nitrite: x   Leuk Esterase: x / RBC: x / WBC x   Sq Epi: x / Non Sq Epi: x / Bacteria: x      CAPILLARY BLOOD GLUCOSE Progress note amended to include my discussions with patient, resident, hospitalist, RN, SLP, family    Patient seen in SLP. Was performing yes/no responses verbally and with gestures/ yes-no board. At times, her head gestures and verbalization did not match up, or her verbalization/use of board. She could at times correct with repetition of question, but overall accuracy is improved although not reliable. She had some straining yesterday, but partner states may be combination of both urine and constipation (had constipation at home as well, using prune juice with some benefit) Miralax increased daily standing    Urine Cx returned <10k normal chelsi. She has leukocytosis however, and persistent dysuria; no fever. WBC has downtrended with start of macrobid. Discussed with hospitalist and will continue 3 days course. Patient and family agreeable.    Vitals reviewed, isolated episode . Could be related to straining from constipation and urinary issues, will monitor. Will also continue bladder scans. Had residual oral thrush posterior tongue, will start diflucan in place of nystatin, patient and partner agreeable    RECENT LABS    Vital Signs Last 24 Hrs  T(C): 36.6 (15 Mar 2024 08:51), Max: 36.6 (15 Mar 2024 08:51)  T(F): 97.8 (15 Mar 2024 08:51), Max: 97.8 (15 Mar 2024 08:51)  HR: 80 (15 Mar 2024 08:51) (80 - 81)  BP: 111/73 (15 Mar 2024 08:51) (111/73 - 161/76)  BP(mean): --  RR: 16 (15 Mar 2024 08:51) (16 - 16)  SpO2: 97% (15 Mar 2024 08:51) (97% - 100%)    Parameters below as of 15 Mar 2024 08:51  Patient On (Oxygen Delivery Method): room air                              11.0   12.30 )-----------( 313      ( 15 Mar 2024 11:46 )             33.2     03-14    139  |  100  |  18  ----------------------------<  243<H>  3.9   |  28  |  0.72    Ca    9.6      14 Mar 2024 07:26  Mg     1.8     03-14    TPro  7.6  /  Alb  3.8  /  TBili  1.6<H>  /  DBili  x   /  AST  21  /  ALT  36  /  AlkPhos  63  03-14      Culture - Urine (collected 13 Mar 2024 17:50)  Source: Clean Catch Clean Catch (Midstream)  Final Report (14 Mar 2024 19:49):    <10,000 CFU/mL Normal Urogenital Chelsi      Urinalysis Basic - ( 14 Mar 2024 07:26 )    Color: x / Appearance: x / SG: x / pH: x  Gluc: 243 mg/dL / Ketone: x  / Bili: x / Urobili: x   Blood: x / Protein: x / Nitrite: x   Leuk Esterase: x / RBC: x / WBC x   Sq Epi: x / Non Sq Epi: x / Bacteria: x      CAPILLARY BLOOD GLUCOSE

## 2024-03-15 NOTE — PROGRESS NOTE ADULT - SUBJECTIVE AND OBJECTIVE BOX
|-------------------------------------------|                       Subjective   |-------------------------------------------|    No events overnight  No new complaints to offer me    |------------------------------------------|                       Objective  |------------------------------------------|    Vital Signs Last 24 Hrs  T(F): 97.8 (15 Mar 2024 08:51), Max: 97.8 (15 Mar 2024 08:51)  HR: 80 (15 Mar 2024 08:51) (80 - 81)  BP: 111/73 (15 Mar 2024 08:51) (111/73 - 161/76)  RR: 16 (15 Mar 2024 08:51) (16 - 16)  SpO2: 97% (15 Mar 2024 08:51) (97% - 100%)  I&O's Summary      Examination:   General: NAD, Comfortable  Pulm: CTA BL No Rhonchi Rales or wheezes  Neck: Supple, No JVD  CVS: RRR No rubs murmurs or gallops  Abdomen: Soft, Nontender, Nondistended; No masses or organomegally  Extremities: No calf tenderness, No pitting edema    Labs:                        12.0   13.47 )-----------( 311      ( 14 Mar 2024 07:26 )             36.6       03-14    139  |  100  |  18  ----------------------------<  243  3.9   |  28  |  0.72    Ca    9.6      14 Mar 2024 07:26  Mg     1.8     03-14    TPro  7.6  /  Alb  3.8  /  TBili  1.6  /  DBili  x   /  AST  21  /  ALT  36  /  AlkPhos  63  03-14                  |-------------------------------------------------|                  Assessment and plan  |-------------------------------------------------|    63F with HTN, HLD, DM2, prior CVA, now in acute rehab after left ischemic MCA stroke    Ischemic MCA stroke  #Aphasic due to above  -c/w ASA and plavix  -c/w statin  -PT/OT/SLP  -c/w Aricept    #Essential HTN  On amlodipine 10  and [id number]  at inpatient goal. may titrate lisinopirl to 40 if needed    Oral candidiasis  Symptomatic  Started treatment with nystatin Mar5  Poor response  Started Diflucan Mar15. reassesses Mar22     #DM2  A1C with Estimated Average Glucose Result: 5.5 % (03-02-24 @ 06:25)    #Leukocytosis     Nitrited ua. started on bactrim Mar13  -LE dopplers neg for DVT (b/l) on 3/8

## 2024-03-15 NOTE — PROGRESS NOTE ADULT - CONSTITUTIONAL
alert, smiling, pleasant but easily frustrated and periods of impulsivity
alert, smiling, pleasant but easily frustrated and periods of impulsivity
NAD, comfortable

## 2024-03-16 PROCEDURE — 99232 SBSQ HOSP IP/OBS MODERATE 35: CPT

## 2024-03-16 RX ADMIN — Medication 1 SPRAY(S): at 22:00

## 2024-03-16 RX ADMIN — Medication 81 MILLIGRAM(S): at 11:31

## 2024-03-16 RX ADMIN — Medication 1 APPLICATION(S): at 22:04

## 2024-03-16 RX ADMIN — FLUCONAZOLE 100 MILLIGRAM(S): 150 TABLET ORAL at 11:31

## 2024-03-16 RX ADMIN — Medication 1 SPRAY(S): at 11:32

## 2024-03-16 RX ADMIN — GABAPENTIN 200 MILLIGRAM(S): 400 CAPSULE ORAL at 22:01

## 2024-03-16 RX ADMIN — POLYETHYLENE GLYCOL 3350 17 GRAM(S): 17 POWDER, FOR SOLUTION ORAL at 11:31

## 2024-03-16 RX ADMIN — Medication 20 MILLIEQUIVALENT(S): at 11:31

## 2024-03-16 RX ADMIN — AMLODIPINE BESYLATE 10 MILLIGRAM(S): 2.5 TABLET ORAL at 06:22

## 2024-03-16 RX ADMIN — DONEPEZIL HYDROCHLORIDE 5 MILLIGRAM(S): 10 TABLET, FILM COATED ORAL at 07:54

## 2024-03-16 RX ADMIN — Medication 1 SPRAY(S): at 06:23

## 2024-03-16 RX ADMIN — Medication 1 APPLICATION(S): at 06:32

## 2024-03-16 RX ADMIN — LISINOPRIL 20 MILLIGRAM(S): 2.5 TABLET ORAL at 06:22

## 2024-03-16 RX ADMIN — Medication 1 APPLICATION(S): at 13:20

## 2024-03-16 RX ADMIN — Medication 100 MILLIGRAM(S): at 06:22

## 2024-03-16 RX ADMIN — ENOXAPARIN SODIUM 30 MILLIGRAM(S): 100 INJECTION SUBCUTANEOUS at 21:55

## 2024-03-16 RX ADMIN — ATORVASTATIN CALCIUM 80 MILLIGRAM(S): 80 TABLET, FILM COATED ORAL at 21:54

## 2024-03-16 RX ADMIN — CLOPIDOGREL BISULFATE 75 MILLIGRAM(S): 75 TABLET, FILM COATED ORAL at 11:31

## 2024-03-16 NOTE — PROGRESS NOTE ADULT - SUBJECTIVE AND OBJECTIVE BOX
Cc: CVA    HPI: Patient with no new medical issues today.  Pain controlled, no chest pain, no N/V, no Fevers/Chills. No other new ROS  Has been tolerating rehabilitation program.    acetaminophen     Tablet .. 650 milliGRAM(s) Oral every 6 hours PRN  aluminum hydroxide/magnesium hydroxide/simethicone Suspension 30 milliLiter(s) Oral every 4 hours PRN  amLODIPine   Tablet 10 milliGRAM(s) Oral daily  AQUAPHOR (petrolatum Ointment) 1 Application(s) Topical three times a day  aspirin enteric coated 81 milliGRAM(s) Oral daily  atorvastatin 80 milliGRAM(s) Oral at bedtime  clopidogrel Tablet 75 milliGRAM(s) Oral daily  dextrose 5%. 1000 milliLiter(s) IV Continuous <Continuous>  dextrose 50% Injectable 12.5 Gram(s) IV Push once  dextrose 50% Injectable 25 Gram(s) IV Push once  donepezil 5 milliGRAM(s) Oral <User Schedule>  enoxaparin Injectable 30 milliGRAM(s) SubCutaneous <User Schedule>  fluconAZOLE   Tablet 100 milliGRAM(s) Oral daily  gabapentin 200 milliGRAM(s) Oral at bedtime  glucagon  Injectable 1 milliGRAM(s) IntraMuscular once  lisinopril 20 milliGRAM(s) Oral daily  melatonin 3 milliGRAM(s) Oral at bedtime PRN  ondansetron   Disintegrating Tablet 4 milliGRAM(s) Oral every 8 hours PRN  polyethylene glycol 3350 17 Gram(s) Oral daily  potassium chloride   Powder 20 milliEquivalent(s) Oral daily  senna 2 Tablet(s) Oral at bedtime  sodium chloride 0.65% Nasal 1 Spray(s) Both Nostrils three times a day    T(C): 36.6 (03-15-24 @ 20:56), Max: 36.6 (03-15-24 @ 08:51)  HR: 94 (03-16-24 @ 06:28) (71 - 94)  BP: 158/77 (03-16-24 @ 06:28) (111/73 - 158/77)  RR: 16 (03-15-24 @ 20:56) (16 - 16)  SpO2: 99% (03-15-24 @ 20:56) (97% - 99%)    In NAD  HEENT- EOMI  Heart- Well Perfused  Lungs- No resp distress, no use of accessory resp muscles  Abd- + BS, NT  Ext- No calf pain  Neuro- Exam unchanged  Psych- Affect wnl                          11.0   12.30 )-----------( 313      ( 15 Mar 2024 11:46 )             33.2       Imp: Patient with diagnosis of CVA admitted for comprehensive acute rehabilitation.    Plan:  - Continue therapies  - DVT prophylaxis - Lovenox  - Skin- Turn q2h, check skin daily  - Continue current medications; patient medically stable.   - Patient is stable to continue current rehabilitation program- requires active and ongoing therapeutic interventions of multiple disciplines and can tolerate 3h/d of therapies. Can actively participate and benefit from an intensive rehabilitation program. Requires supervision of a rehabilitation physician and a coordinated interdisciplinary approach to providing rehabilitation.    Cc: CVA    HPI: Patient with no new medical issues today.  Pain controlled, no chest pain, no N/V, no Fevers/Chills. No other new ROS  Has been tolerating rehabilitation program.    acetaminophen     Tablet .. 650 milliGRAM(s) Oral every 6 hours PRN  aluminum hydroxide/magnesium hydroxide/simethicone Suspension 30 milliLiter(s) Oral every 4 hours PRN  amLODIPine   Tablet 10 milliGRAM(s) Oral daily  AQUAPHOR (petrolatum Ointment) 1 Application(s) Topical three times a day  aspirin enteric coated 81 milliGRAM(s) Oral daily  atorvastatin 80 milliGRAM(s) Oral at bedtime  clopidogrel Tablet 75 milliGRAM(s) Oral daily  dextrose 5%. 1000 milliLiter(s) IV Continuous <Continuous>  dextrose 50% Injectable 12.5 Gram(s) IV Push once  dextrose 50% Injectable 25 Gram(s) IV Push once  donepezil 5 milliGRAM(s) Oral <User Schedule>  enoxaparin Injectable 30 milliGRAM(s) SubCutaneous <User Schedule>  fluconAZOLE   Tablet 100 milliGRAM(s) Oral daily  gabapentin 200 milliGRAM(s) Oral at bedtime  glucagon  Injectable 1 milliGRAM(s) IntraMuscular once  lisinopril 20 milliGRAM(s) Oral daily  melatonin 3 milliGRAM(s) Oral at bedtime PRN  ondansetron   Disintegrating Tablet 4 milliGRAM(s) Oral every 8 hours PRN  polyethylene glycol 3350 17 Gram(s) Oral daily  potassium chloride   Powder 20 milliEquivalent(s) Oral daily  senna 2 Tablet(s) Oral at bedtime  sodium chloride 0.65% Nasal 1 Spray(s) Both Nostrils three times a day    T(C): 36.6 (03-15-24 @ 20:56), Max: 36.6 (03-15-24 @ 08:51)  HR: 94 (03-16-24 @ 06:28) (71 - 94)  BP: 158/77 (03-16-24 @ 06:28) (111/73 - 158/77)  RR: 16 (03-15-24 @ 20:56) (16 - 16)  SpO2: 99% (03-15-24 @ 20:56) (97% - 99%)    In NAD  HEENT- EOMI  Heart- Well Perfused  Lungs- No resp distress, no use of accessory resp muscles  Abd- + BS, NT  Ext- No calf pain  Neuro- Exam unchanged  Psych- Affect wnl                          11.0   12.30 )-----------( 313      ( 15 Mar 2024 11:46 )             33.2       Imp: Patient with diagnosis of CVA admitted for comprehensive acute rehabilitation.    Plan:  - Continue therapies  - DVT prophylaxis - Lovenox  - Skin- Turn q2h, check skin daily  - Finish course of antibiotics for UTI  - Continue current medications; patient medically stable.   - Patient is stable to continue current rehabilitation program- requires active and ongoing therapeutic interventions of multiple disciplines and can tolerate 3h/d of therapies. Can actively participate and benefit from an intensive rehabilitation program. Requires supervision of a rehabilitation physician and a coordinated interdisciplinary approach to providing rehabilitation.

## 2024-03-16 NOTE — PROGRESS NOTE ADULT - SUBJECTIVE AND OBJECTIVE BOX
Hospitalist: Isaac Pozo DO    CHIEF COMPLAINT: Patient is a 63y old  female who presents with a chief complaint of L MCA CVA with expressive aphasia, right hemiparesis, and right facial droop (16 Mar 2024 07:54)      SUBJECTIVE / OVERNIGHT EVENTS: Patient seen and examined. No acute events overnight. Pain well controlled and patient without any complaints.    MEDICATIONS  (STANDING):  amLODIPine   Tablet 10 milliGRAM(s) Oral daily  AQUAPHOR (petrolatum Ointment) 1 Application(s) Topical three times a day  aspirin enteric coated 81 milliGRAM(s) Oral daily  atorvastatin 80 milliGRAM(s) Oral at bedtime  clopidogrel Tablet 75 milliGRAM(s) Oral daily  dextrose 5%. 1000 milliLiter(s) (50 mL/Hr) IV Continuous <Continuous>  dextrose 50% Injectable 12.5 Gram(s) IV Push once  dextrose 50% Injectable 25 Gram(s) IV Push once  donepezil 5 milliGRAM(s) Oral <User Schedule>  enoxaparin Injectable 30 milliGRAM(s) SubCutaneous <User Schedule>  fluconAZOLE   Tablet 100 milliGRAM(s) Oral daily  gabapentin 200 milliGRAM(s) Oral at bedtime  glucagon  Injectable 1 milliGRAM(s) IntraMuscular once  lisinopril 20 milliGRAM(s) Oral daily  polyethylene glycol 3350 17 Gram(s) Oral daily  potassium chloride   Powder 20 milliEquivalent(s) Oral daily  senna 2 Tablet(s) Oral at bedtime  sodium chloride 0.65% Nasal 1 Spray(s) Both Nostrils three times a day    MEDICATIONS  (PRN):  acetaminophen     Tablet .. 650 milliGRAM(s) Oral every 6 hours PRN Temp greater or equal to 38C (100.4F), Mild Pain (1 - 3)  aluminum hydroxide/magnesium hydroxide/simethicone Suspension 30 milliLiter(s) Oral every 4 hours PRN Dyspepsia  melatonin 3 milliGRAM(s) Oral at bedtime PRN Insomnia  ondansetron   Disintegrating Tablet 4 milliGRAM(s) Oral every 8 hours PRN Nausea and/or Vomiting      VITALS:  T(F): 97.4 (03-16-24 @ 08:01), Max: 97.8 (03-15-24 @ 20:56)  HR: 78 (03-16-24 @ 08:01) (71 - 94)  BP: 127/77 (03-16-24 @ 08:01) (127/77 - 158/77)  RR: 16 (03-16-24 @ 08:01) (16 - 16)  SpO2: 99% (03-16-24 @ 08:01)      REVIEW OF SYSTEMS:  For ROV please refer back to H&P     PHYSICAL EXAM:  General: NAD, Comfortable  Pulm: CTA BL No Rhonchi Rales or wheezes  Neck: Supple, No JVD  CVS: RRR No rubs murmurs or gallops  Abdomen: Soft, Nontender, Nondistended; No masses or organomegally  Extremities: No calf tenderness, No pitting edema        LABS:              11.0                 x    | x    | x            12.30 >-----------< 313     ------------------------< x                     33.2                 x    | x    | x                                            Ca x     Mg x     Ph x          RADIOLOGY & ADDITIONAL TESTS:    Imaging Personally Reviewed:    [X] Consultant(s) Notes Reviewed:  [X] Care Discussed with Consultants/Other Providers:

## 2024-03-16 NOTE — PROGRESS NOTE ADULT - ASSESSMENT
63F with HTN, HLD, DM2, prior CVA, now in acute rehab after left ischemic MCA stroke    Ischemic MCA stroke  #Aphasic due to above  -c/w ASA and plavix  -c/w statin  -PT/OT/SLP  -c/w Aricept    #Essential HTN  On amlodipine 10  and [id number]  at inpatient goal. may titrate lisinopirl to 40 if needed    Oral candidiasis  Symptomatic  Started treatment with nystatin Mar5  Poor response  Started Diflucan Mar15. reassesses Mar22     #DM2  A1C with Estimated Average Glucose Result: 5.5 % (03-02-24 @ 06:25)    #Leukocytosis     Nitrited ua. started on bactrim Mar13  -LE dopplers neg for DVT (b/l) on 3/8

## 2024-03-17 PROCEDURE — 99232 SBSQ HOSP IP/OBS MODERATE 35: CPT

## 2024-03-17 RX ADMIN — Medication 20 MILLIEQUIVALENT(S): at 12:02

## 2024-03-17 RX ADMIN — Medication 1 APPLICATION(S): at 21:17

## 2024-03-17 RX ADMIN — DONEPEZIL HYDROCHLORIDE 5 MILLIGRAM(S): 10 TABLET, FILM COATED ORAL at 08:21

## 2024-03-17 RX ADMIN — ATORVASTATIN CALCIUM 80 MILLIGRAM(S): 80 TABLET, FILM COATED ORAL at 21:15

## 2024-03-17 RX ADMIN — AMLODIPINE BESYLATE 10 MILLIGRAM(S): 2.5 TABLET ORAL at 06:35

## 2024-03-17 RX ADMIN — GABAPENTIN 200 MILLIGRAM(S): 400 CAPSULE ORAL at 21:15

## 2024-03-17 RX ADMIN — Medication 1 APPLICATION(S): at 14:57

## 2024-03-17 RX ADMIN — ENOXAPARIN SODIUM 30 MILLIGRAM(S): 100 INJECTION SUBCUTANEOUS at 21:16

## 2024-03-17 RX ADMIN — CLOPIDOGREL BISULFATE 75 MILLIGRAM(S): 75 TABLET, FILM COATED ORAL at 12:02

## 2024-03-17 RX ADMIN — POLYETHYLENE GLYCOL 3350 17 GRAM(S): 17 POWDER, FOR SOLUTION ORAL at 12:02

## 2024-03-17 RX ADMIN — SENNA PLUS 2 TABLET(S): 8.6 TABLET ORAL at 21:15

## 2024-03-17 RX ADMIN — Medication 1 SPRAY(S): at 14:57

## 2024-03-17 RX ADMIN — LISINOPRIL 20 MILLIGRAM(S): 2.5 TABLET ORAL at 06:35

## 2024-03-17 RX ADMIN — FLUCONAZOLE 100 MILLIGRAM(S): 150 TABLET ORAL at 12:02

## 2024-03-17 RX ADMIN — Medication 1 SPRAY(S): at 21:15

## 2024-03-17 RX ADMIN — Medication 1 SPRAY(S): at 06:36

## 2024-03-17 RX ADMIN — Medication 81 MILLIGRAM(S): at 12:02

## 2024-03-17 RX ADMIN — Medication 1 APPLICATION(S): at 06:41

## 2024-03-17 NOTE — PROGRESS NOTE ADULT - ASSESSMENT
63F with HTN, HLD, DM2, prior CVA, now in acute rehab after left ischemic MCA stroke    Ischemic MCA stroke  #Aphasic due to above  -c/w ASA and plavix  -c/w statin  -PT/OT/SLP  -c/w Aricept    #Essential HTN  On amlodipine 10  and [id number]  at inpatient goal. may titrate lisinopirl to 40 if needed    Oral candidiasis  Symptomatic  Started treatment with nystatin Mar5  Poor response  Started Diflucan Mar15. reassesses Mar22     #DM2  A1C with Estimated Average Glucose Result: 5.5 % (03-02-24 @ 06:25)    #Leukocytosis     Nitrited ua. started on bactrim Mar13  -LE dopplers neg for DVT (b/l) on 3/8

## 2024-03-17 NOTE — PROGRESS NOTE ADULT - SUBJECTIVE AND OBJECTIVE BOX
Cc: CVA    HPI: Patient with no new medical issues today.  Pain controlled, no chest pain, no N/V, no Fevers/Chills. No other new ROS  Has been tolerating rehabilitation program.    MEDICATIONS  (STANDING):  amLODIPine   Tablet 10 milliGRAM(s) Oral daily  AQUAPHOR (petrolatum Ointment) 1 Application(s) Topical three times a day  aspirin enteric coated 81 milliGRAM(s) Oral daily  atorvastatin 80 milliGRAM(s) Oral at bedtime  clopidogrel Tablet 75 milliGRAM(s) Oral daily  dextrose 5%. 1000 milliLiter(s) (50 mL/Hr) IV Continuous <Continuous>  dextrose 50% Injectable 12.5 Gram(s) IV Push once  dextrose 50% Injectable 25 Gram(s) IV Push once  donepezil 5 milliGRAM(s) Oral <User Schedule>  enoxaparin Injectable 30 milliGRAM(s) SubCutaneous <User Schedule>  fluconAZOLE   Tablet 100 milliGRAM(s) Oral daily  gabapentin 200 milliGRAM(s) Oral at bedtime  glucagon  Injectable 1 milliGRAM(s) IntraMuscular once  lisinopril 20 milliGRAM(s) Oral daily  polyethylene glycol 3350 17 Gram(s) Oral daily  potassium chloride   Powder 20 milliEquivalent(s) Oral daily  senna 2 Tablet(s) Oral at bedtime  sodium chloride 0.65% Nasal 1 Spray(s) Both Nostrils three times a day    MEDICATIONS  (PRN):  acetaminophen     Tablet .. 650 milliGRAM(s) Oral every 6 hours PRN Temp greater or equal to 38C (100.4F), Mild Pain (1 - 3)  aluminum hydroxide/magnesium hydroxide/simethicone Suspension 30 milliLiter(s) Oral every 4 hours PRN Dyspepsia  melatonin 3 milliGRAM(s) Oral at bedtime PRN Insomnia  ondansetron   Disintegrating Tablet 4 milliGRAM(s) Oral every 8 hours PRN Nausea and/or Vomiting    Vital Signs Last 24 Hrs  T(C): 36.6 (16 Mar 2024 20:40), Max: 36.6 (16 Mar 2024 20:40)  T(F): 97.8 (16 Mar 2024 20:40), Max: 97.8 (16 Mar 2024 20:40)  HR: 69 (17 Mar 2024 06:40) (69 - 92)  BP: 147/77 (17 Mar 2024 06:40) (143/83 - 147/77)  BP(mean): --  RR: 16 (16 Mar 2024 20:40) (16 - 16)  SpO2: 97% (16 Mar 2024 20:40) (97% - 97%)    In NAD  HEENT- EOMI  Heart- Well Perfused  Lungs- No resp distress, no use of accessory resp muscles  Abd- + BS, NT  Ext- No calf pain  Neuro- Exam unchanged  Psych- Affect wnl    Imp: Patient with diagnosis of CVA admitted for comprehensive acute rehabilitation.    Plan:  - Continue therapies  - DVT prophylaxis - Lovenox  - Skin- Turn q2h, check skin daily  - Finish course of antibiotics for UTI  - Continue current medications; patient medically stable.   - Patient is stable to continue current rehabilitation program- requires active and ongoing therapeutic interventions of multiple disciplines and can tolerate 3h/d of therapies. Can actively participate and benefit from an intensive rehabilitation program. Requires supervision of a rehabilitation physician and a coordinated interdisciplinary approach to providing rehabilitation.

## 2024-03-17 NOTE — PROGRESS NOTE ADULT - SUBJECTIVE AND OBJECTIVE BOX
Hospitalist: Isaac Pozo DO    CHIEF COMPLAINT: Patient is a 63y old  female who presents with a chief complaint of L MCA CVA with expressive aphasia, right hemiparesis, and right facial droop (17 Mar 2024 08:12)      SUBJECTIVE / OVERNIGHT EVENTS: Patient seen and examined. No acute events overnight. Pain well controlled and patient without any complaints.    MEDICATIONS  (STANDING):  amLODIPine   Tablet 10 milliGRAM(s) Oral daily  AQUAPHOR (petrolatum Ointment) 1 Application(s) Topical three times a day  aspirin enteric coated 81 milliGRAM(s) Oral daily  atorvastatin 80 milliGRAM(s) Oral at bedtime  clopidogrel Tablet 75 milliGRAM(s) Oral daily  dextrose 5%. 1000 milliLiter(s) (50 mL/Hr) IV Continuous <Continuous>  dextrose 50% Injectable 12.5 Gram(s) IV Push once  dextrose 50% Injectable 25 Gram(s) IV Push once  donepezil 5 milliGRAM(s) Oral <User Schedule>  enoxaparin Injectable 30 milliGRAM(s) SubCutaneous <User Schedule>  fluconAZOLE   Tablet 100 milliGRAM(s) Oral daily  gabapentin 200 milliGRAM(s) Oral at bedtime  glucagon  Injectable 1 milliGRAM(s) IntraMuscular once  lisinopril 20 milliGRAM(s) Oral daily  polyethylene glycol 3350 17 Gram(s) Oral daily  potassium chloride   Powder 20 milliEquivalent(s) Oral daily  senna 2 Tablet(s) Oral at bedtime  sodium chloride 0.65% Nasal 1 Spray(s) Both Nostrils three times a day    MEDICATIONS  (PRN):  acetaminophen     Tablet .. 650 milliGRAM(s) Oral every 6 hours PRN Temp greater or equal to 38C (100.4F), Mild Pain (1 - 3)  aluminum hydroxide/magnesium hydroxide/simethicone Suspension 30 milliLiter(s) Oral every 4 hours PRN Dyspepsia  melatonin 3 milliGRAM(s) Oral at bedtime PRN Insomnia  ondansetron   Disintegrating Tablet 4 milliGRAM(s) Oral every 8 hours PRN Nausea and/or Vomiting      VITALS:  T(F): 97.8 (03-17-24 @ 08:19), Max: 97.8 (03-16-24 @ 20:40)  HR: 85 (03-17-24 @ 08:19) (69 - 92)  BP: 133/76 (03-17-24 @ 08:19) (133/76 - 147/77)  RR: 15 (03-17-24 @ 08:19) (15 - 16)  SpO2: 99% (03-17-24 @ 08:19)      REVIEW OF SYSTEMS:  For ROV please refer back to H&P     PHYSICAL EXAM:  General: NAD, Comfortable  Pulm: CTA BL No Rhonchi Rales or wheezes  Neck: Supple, No JVD  CVS: RRR No rubs murmurs or gallops  Abdomen: Soft, Nontender, Nondistended; No masses or organomegally  Extremities: No calf tenderness, No pitting edema    RADIOLOGY & ADDITIONAL TESTS:    Imaging Personally Reviewed:    [X] Consultant(s) Notes Reviewed:  [X] Care Discussed with Consultants/Other Providers:

## 2024-03-18 LAB
ALBUMIN SERPL ELPH-MCNC: 3.5 G/DL — SIGNIFICANT CHANGE UP (ref 3.3–5)
ALP SERPL-CCNC: 66 U/L — SIGNIFICANT CHANGE UP (ref 40–120)
ALT FLD-CCNC: 39 U/L — SIGNIFICANT CHANGE UP (ref 10–45)
ANION GAP SERPL CALC-SCNC: 9 MMOL/L — SIGNIFICANT CHANGE UP (ref 5–17)
AST SERPL-CCNC: 24 U/L — SIGNIFICANT CHANGE UP (ref 10–40)
BASOPHILS # BLD AUTO: 0.1 K/UL — SIGNIFICANT CHANGE UP (ref 0–0.2)
BASOPHILS NFR BLD AUTO: 0.8 % — SIGNIFICANT CHANGE UP (ref 0–2)
BILIRUB SERPL-MCNC: 1 MG/DL — SIGNIFICANT CHANGE UP (ref 0.2–1.2)
BUN SERPL-MCNC: 18 MG/DL — SIGNIFICANT CHANGE UP (ref 7–23)
CALCIUM SERPL-MCNC: 9.7 MG/DL — SIGNIFICANT CHANGE UP (ref 8.4–10.5)
CHLORIDE SERPL-SCNC: 99 MMOL/L — SIGNIFICANT CHANGE UP (ref 96–108)
CO2 SERPL-SCNC: 29 MMOL/L — SIGNIFICANT CHANGE UP (ref 22–31)
CREAT SERPL-MCNC: 0.89 MG/DL — SIGNIFICANT CHANGE UP (ref 0.5–1.3)
EGFR: 72 ML/MIN/1.73M2 — SIGNIFICANT CHANGE UP
EOSINOPHIL # BLD AUTO: 0.18 K/UL — SIGNIFICANT CHANGE UP (ref 0–0.5)
EOSINOPHIL NFR BLD AUTO: 1.4 % — SIGNIFICANT CHANGE UP (ref 0–6)
GLUCOSE BLDC GLUCOMTR-MCNC: 196 MG/DL — HIGH (ref 70–99)
GLUCOSE SERPL-MCNC: 159 MG/DL — HIGH (ref 70–99)
HCT VFR BLD CALC: 32.5 % — LOW (ref 34.5–45)
HGB BLD-MCNC: 10.5 G/DL — LOW (ref 11.5–15.5)
IMM GRANULOCYTES NFR BLD AUTO: 0.6 % — SIGNIFICANT CHANGE UP (ref 0–0.9)
LYMPHOCYTES # BLD AUTO: 29.6 % — SIGNIFICANT CHANGE UP (ref 13–44)
LYMPHOCYTES # BLD AUTO: 3.75 K/UL — HIGH (ref 1–3.3)
MAGNESIUM SERPL-MCNC: 2 MG/DL — SIGNIFICANT CHANGE UP (ref 1.6–2.6)
MCHC RBC-ENTMCNC: 28.7 PG — SIGNIFICANT CHANGE UP (ref 27–34)
MCHC RBC-ENTMCNC: 32.3 GM/DL — SIGNIFICANT CHANGE UP (ref 32–36)
MCV RBC AUTO: 88.8 FL — SIGNIFICANT CHANGE UP (ref 80–100)
MONOCYTES # BLD AUTO: 1.03 K/UL — HIGH (ref 0–0.9)
MONOCYTES NFR BLD AUTO: 8.1 % — SIGNIFICANT CHANGE UP (ref 2–14)
NEUTROPHILS # BLD AUTO: 7.52 K/UL — HIGH (ref 1.8–7.4)
NEUTROPHILS NFR BLD AUTO: 59.5 % — SIGNIFICANT CHANGE UP (ref 43–77)
NRBC # BLD: 0 /100 WBCS — SIGNIFICANT CHANGE UP (ref 0–0)
PLATELET # BLD AUTO: 338 K/UL — SIGNIFICANT CHANGE UP (ref 150–400)
POTASSIUM SERPL-MCNC: 4.7 MMOL/L — SIGNIFICANT CHANGE UP (ref 3.5–5.3)
POTASSIUM SERPL-SCNC: 4.7 MMOL/L — SIGNIFICANT CHANGE UP (ref 3.5–5.3)
PROT SERPL-MCNC: 6.9 G/DL — SIGNIFICANT CHANGE UP (ref 6–8.3)
RBC # BLD: 3.66 M/UL — LOW (ref 3.8–5.2)
RBC # FLD: 11.9 % — SIGNIFICANT CHANGE UP (ref 10.3–14.5)
SODIUM SERPL-SCNC: 137 MMOL/L — SIGNIFICANT CHANGE UP (ref 135–145)
WBC # BLD: 12.65 K/UL — HIGH (ref 3.8–10.5)
WBC # FLD AUTO: 12.65 K/UL — HIGH (ref 3.8–10.5)

## 2024-03-18 PROCEDURE — 99232 SBSQ HOSP IP/OBS MODERATE 35: CPT

## 2024-03-18 PROCEDURE — 99233 SBSQ HOSP IP/OBS HIGH 50: CPT

## 2024-03-18 RX ORDER — SODIUM CHLORIDE 9 MG/ML
1000 INJECTION, SOLUTION INTRAVENOUS
Refills: 0 | Status: DISCONTINUED | OUTPATIENT
Start: 2024-03-18 | End: 2024-03-25

## 2024-03-18 RX ORDER — DEXTROSE 50 % IN WATER 50 %
25 SYRINGE (ML) INTRAVENOUS ONCE
Refills: 0 | Status: DISCONTINUED | OUTPATIENT
Start: 2024-03-18 | End: 2024-03-25

## 2024-03-18 RX ORDER — POLYETHYLENE GLYCOL 3350 17 G/17G
17 POWDER, FOR SOLUTION ORAL
Refills: 0 | Status: DISCONTINUED | OUTPATIENT
Start: 2024-03-18 | End: 2024-03-25

## 2024-03-18 RX ORDER — GLUCAGON INJECTION, SOLUTION 0.5 MG/.1ML
1 INJECTION, SOLUTION SUBCUTANEOUS ONCE
Refills: 0 | Status: DISCONTINUED | OUTPATIENT
Start: 2024-03-18 | End: 2024-03-25

## 2024-03-18 RX ORDER — FLUCONAZOLE 150 MG/1
200 TABLET ORAL DAILY
Refills: 0 | Status: COMPLETED | OUTPATIENT
Start: 2024-03-19 | End: 2024-03-22

## 2024-03-18 RX ORDER — FLUCONAZOLE 150 MG/1
100 TABLET ORAL ONCE
Refills: 0 | Status: COMPLETED | OUTPATIENT
Start: 2024-03-18 | End: 2024-03-18

## 2024-03-18 RX ORDER — INSULIN LISPRO 100/ML
VIAL (ML) SUBCUTANEOUS AT BEDTIME
Refills: 0 | Status: DISCONTINUED | OUTPATIENT
Start: 2024-03-18 | End: 2024-03-25

## 2024-03-18 RX ORDER — DEXTROSE 50 % IN WATER 50 %
15 SYRINGE (ML) INTRAVENOUS ONCE
Refills: 0 | Status: DISCONTINUED | OUTPATIENT
Start: 2024-03-18 | End: 2024-03-25

## 2024-03-18 RX ORDER — INSULIN LISPRO 100/ML
VIAL (ML) SUBCUTANEOUS
Refills: 0 | Status: DISCONTINUED | OUTPATIENT
Start: 2024-03-18 | End: 2024-03-25

## 2024-03-18 RX ORDER — DEXTROSE 50 % IN WATER 50 %
12.5 SYRINGE (ML) INTRAVENOUS ONCE
Refills: 0 | Status: DISCONTINUED | OUTPATIENT
Start: 2024-03-18 | End: 2024-03-25

## 2024-03-18 RX ADMIN — POLYETHYLENE GLYCOL 3350 17 GRAM(S): 17 POWDER, FOR SOLUTION ORAL at 12:11

## 2024-03-18 RX ADMIN — POLYETHYLENE GLYCOL 3350 17 GRAM(S): 17 POWDER, FOR SOLUTION ORAL at 17:34

## 2024-03-18 RX ADMIN — Medication 20 MILLIEQUIVALENT(S): at 12:12

## 2024-03-18 RX ADMIN — Medication 81 MILLIGRAM(S): at 12:11

## 2024-03-18 RX ADMIN — GABAPENTIN 200 MILLIGRAM(S): 400 CAPSULE ORAL at 21:08

## 2024-03-18 RX ADMIN — CLOPIDOGREL BISULFATE 75 MILLIGRAM(S): 75 TABLET, FILM COATED ORAL at 12:12

## 2024-03-18 RX ADMIN — AMLODIPINE BESYLATE 10 MILLIGRAM(S): 2.5 TABLET ORAL at 06:01

## 2024-03-18 RX ADMIN — FLUCONAZOLE 100 MILLIGRAM(S): 150 TABLET ORAL at 12:12

## 2024-03-18 RX ADMIN — ATORVASTATIN CALCIUM 80 MILLIGRAM(S): 80 TABLET, FILM COATED ORAL at 21:08

## 2024-03-18 RX ADMIN — ENOXAPARIN SODIUM 30 MILLIGRAM(S): 100 INJECTION SUBCUTANEOUS at 21:16

## 2024-03-18 RX ADMIN — DONEPEZIL HYDROCHLORIDE 5 MILLIGRAM(S): 10 TABLET, FILM COATED ORAL at 07:53

## 2024-03-18 RX ADMIN — Medication 1 APPLICATION(S): at 15:12

## 2024-03-18 RX ADMIN — SENNA PLUS 2 TABLET(S): 8.6 TABLET ORAL at 21:08

## 2024-03-18 RX ADMIN — Medication 1 APPLICATION(S): at 22:27

## 2024-03-18 RX ADMIN — Medication 1 SPRAY(S): at 15:10

## 2024-03-18 RX ADMIN — LISINOPRIL 20 MILLIGRAM(S): 2.5 TABLET ORAL at 06:00

## 2024-03-18 RX ADMIN — FLUCONAZOLE 100 MILLIGRAM(S): 150 TABLET ORAL at 15:09

## 2024-03-18 RX ADMIN — Medication 1 APPLICATION(S): at 06:05

## 2024-03-18 RX ADMIN — Medication 1 SPRAY(S): at 21:09

## 2024-03-18 RX ADMIN — Medication 1 SPRAY(S): at 06:01

## 2024-03-18 NOTE — PROGRESS NOTE ADULT - ATTENDING COMMENTS
Pt. seen with resident.  Agree with documentation above as per resident with amendments made as appropriate. Patient medically stable. Making progress towards rehab goals.     Left MCA infarct  oral thrush  - not improved with nystatin suspension (3/5 - 3/15) --> d/c  - start diflucan 200 mg x1, then 100 mg QD for 7 days  - continue oral care    --miralax increased to BID for constipation

## 2024-03-18 NOTE — PROGRESS NOTE ADULT - ASSESSMENT
62 YO female with PMH HTN, HLD, DM2, CVA (10/2023), who presented to [facility] ED on 3/1/24 with right sided facial, right arm and leg weakness, aphasia and episodes of confusion as well as a fall. MRI brain demonstrated multiple small acute infarctions in the left MCA distribution     #Left MCA CVA with expressive aphasia, right hemiparesis, and right facial droop  - MRI 3/1: Multiple foci of diffusion restriction scattered throughout the left MCA vascular distribution, compatible with acute infarctions. Chronic lacunar infarctions in the right centrum semiovale/corona radiata and left hemipons. Encephalomalacia/gliotic change in the anterior right temporal lobe. Foci of susceptibility artifact in the right basal ganglia, likely sequela of prior micro-hemorrhages.  - ASA & plavix for 3 months, Discontinue ASA after 3 months (6/1/24)  - High dose statin  - Continue Aricept 5 mg Q8AM 3/9  - Continue Comprehensive Rehab Program: PT/OT/ST, 3hours daily and 5 days weekly  - right elbow comfy splint to improve extension. Patient does not tolerate right resting hand splint, will request OT to re-eval fit  - will benefit from AFO for right ankle DF assist and knee stabilization on dc  - continue to monitor RUE spasticity. Patient's partner defers start of antispasticity medication at this time due to concern of sedation  - recreation therapy  - Precautions: fall, aspiration, cardiac, DM, AMS    # dysphonia  - ENT 3/7: eval VC, difficulty tolerating laryngoscopy through nose, (+)dried nasal crusting, tolerated laryngoscopy through mouth, vocal cords mobile bilaterally, no pooling, no lesions  - Nasal saline spray TID    # HTN  - Amlodipine 10 mg daily  - Lisinopril 20mg daily  - BP (111/73 - 161/76) 3/15. Isolated elevation last evening at 8 PM; will monitor, see if correlates with constipation/dysuria    # HLD  - Lipitor 80mg daily    # Diabetes Mellitus Type 2 without complications  - A1c 5.5 on 3/2/24  - FS and ISS discontinued on 3/6    # leukocytosis  - dysuria  - UA negative 3/10.  - Repeat UA {3/12): +nit sm LE WBC 2. Patient is symptomatic, dysuria, frequency, also more agitated.   - Repeat UA (3/13) now with moderate LE and positive nitrite   - Urine cx (3/13): normal urogenital chelsi  - bladder scan with no signs of retention  - WBC 13.5 3/11--> 10.87 3/12 --> 13.47 3/14 --> 12.30 3/15  - Initiated Macrobid 100 mg BID (started 3/13 PM). Discussed with hospitalist, as patient symptomatic with leukocytosis but urine cx negative, will treat for 3 days (last dose 3/16 AM).     # oral thrush  - not improved with nystatin suspension (3/5 - 3/15) --> d/c  - start diflucan 200 mg x1, then 100 mg QD for 7 days  - continue oral care. Discussed with SLP, patient and caregiver    # hypokalemia  - KCl 20 meq daily re-started 3/7, home medication   - K+  4.2 3/11 --> 3.9 on 3/14, resolved   - BMP 3/18    # hypomagnesemia  - Mg oxide supplemented   - Mg++ 1.5-->2.0 3/11    # Pain management  - Tylenol PRN  - gabapentin 200 mg QHS 3/4   - controlled    # Bowel Regimen  - Senna QHS  - Miralax PRN. Has not been receiving although patient endorsing difficulty passing stool/hard stool. Will change to scheduled daily  - reports good relief with prune juice in past, dietary aware to add to tray    # Sleep:   - Melatonin 3mg nightly PRN    # FEN   - Diet: upgraded to minced/moist and thin liquids 3/5    # DVT ppx  - Bilateral LE duplex (3/8) NEGATIVE DVT  - Lovenox    # Case discussed in IDT rounds 3/13 (follow up):  - mod assist transfers, ambulates 70 feet wiht HHA and max assist ACE wrap DF assist right ankle, min assist-CG bed mobility, min assist transfers, emotionally labile, improved yes/no reliability, set up/supervision eating and grooming, mod assist LB dressing, mod assist toilet and shower trnasfers  - adjusted goals: supervision due to cog-language deficits, CG transfers from wheelchair level including to toilet, CG assist bADLs, WC level mobility on dc. Will need home modifications for WC including potential first floor set up, team to start with partner  - adjusted target: dc home 3/26 with caregiver support and home care referral PT OT SLP  - caregiver training    # LABS  CBC CMP 3/18    #GOC  CODE STATUS: FULL CODE      Outpatient Follow-up (Specialty/Name of physician):    Jacobo Miramontes  Physical/Rehab Medicine  [address] [place] [id number]  Phone: [phone]  Fax: [phone]  Follow Up Time:       64 YO female with PMH HTN, HLD, DM2, CVA (10/2023), who presented to [facility] ED on 3/1/24 with right sided facial, right arm and leg weakness, aphasia and episodes of confusion as well as a fall. MRI brain demonstrated multiple small acute infarctions in the left MCA distribution     #Left MCA CVA with expressive aphasia, right hemiparesis, and right facial droop  - MRI 3/1: Multiple foci of diffusion restriction scattered throughout the left MCA vascular distribution, compatible with acute infarctions. Chronic lacunar infarctions in the right centrum semiovale/corona radiata and left hemipons. Encephalomalacia/gliotic change in the anterior right temporal lobe. Foci of susceptibility artifact in the right basal ganglia, likely sequela of prior micro-hemorrhages.  - ASA & plavix for 3 months, Discontinue ASA after 3 months (6/1/24)  - High dose statin  - Continue Aricept 5 mg Q8AM 3/9  - Continue Comprehensive Rehab Program: PT/OT/ST, 3hours daily and 5 days weekly  - right elbow comfy splint to improve extension. Patient does not tolerate right resting hand splint, will request OT to re-eval fit  - will benefit from AFO for right ankle DF assist and knee stabilization on dc  - continue to monitor RUE spasticity. Patient's partner defers start of antispasticity medication at this time due to concern of sedation  - recreation therapy  - Precautions: fall, aspiration, cardiac, DM, AMS    # dysphonia  - ENT 3/7: eval VC, difficulty tolerating laryngoscopy through nose, (+)dried nasal crusting, tolerated laryngoscopy through mouth, vocal cords mobile bilaterally, no pooling, no lesions  - Nasal saline spray TID    # HTN  - Amlodipine 10 mg daily  - Lisinopril 20mg daily  - BP (117/69 - 145/66) 3/18    # HLD  - Lipitor 80mg daily    # Diabetes Mellitus Type 2 without complications  - A1c 5.5 on 3/2/24  - FS and ISS discontinued on 3/6    # leukocytosis  - dysuria now resolved  - UA negative 3/10  - Repeat UA {3/12): +nit sm LE WBC 2. Patient is symptomatic, dysuria, frequency, also more agitated.   - Repeat UA (3/13) now with moderate LE and positive nitrite   - Urine cx (3/13): normal urogenital chelsi  - bladder scan with no signs of retention  - WBC 13.5 3/11--> 10.87 3/12 --> 13.47 3/14 --> 12.30 3/15 --> 12.65 (3/18)  - s/p Macrobid 100 mg BID (3/13 PM - 3/16 AM). per hospitalist, as patient symptomatic with leukocytosis but urine cx negative, treat for 3 days  - continue to monitor, CBC 3/21    # oral thrush  - not improved with nystatin suspension (3/5 - 3/15) --> d/c  - start diflucan 200 mg x1, then 100 mg QD for 7 days  - continue oral care    # hypokalemia (resolved)  - KCl 20 meq daily re-started 3/7, home medication   - BMP 3/21    # hypomagnesemia (resolved)  - Mg oxide supplemented   - Mg 1.5 (3/5) -->2.0 (3/18)    # Pain management  - Tylenol PRN  - gabapentin 200 mg QHS 3/4   - controlled    # Bowel Regimen/Constipation  - Senna QHS  - Miralax QD --> BID (3/18)  - reports good relief with prune juice in past, dietary aware to add to tray  - encouraged oral hydration    # Sleep:   - Melatonin 3mg nightly PRN    # FEN   - Diet: upgraded to minced/moist and thin liquids 3/5    # DVT ppx  - Bilateral LE duplex (3/8) NEGATIVE DVT  - Lovenox    # Case discussed in IDT rounds 3/13 (follow up):  - mod assist transfers, ambulates 70 feet wiht HHA and max assist ACE wrap DF assist right ankle, min assist-CG bed mobility, min assist transfers, emotionally labile, improved yes/no reliability, set up/supervision eating and grooming, mod assist LB dressing, mod assist toilet and shower trnasfers  - adjusted goals: supervision due to cog-language deficits, CG transfers from wheelchair level including to toilet, CG assist bADLs, WC level mobility on dc. Will need home modifications for WC including potential first floor set up, team to start with partner  - adjusted target: dc home 3/26 with caregiver support and home care referral PT OT SLP  - caregiver training    # LABS  CBC CMP 3/21    #GOC  CODE STATUS: FULL CODE      Outpatient Follow-up (Specialty/Name of physician):    Jacobo Miramontes  Physical/Rehab Medicine  [address] [place] [id number]  Phone: [phone]  Fax: [phone]  Follow Up Time:

## 2024-03-18 NOTE — PROGRESS NOTE ADULT - SUBJECTIVE AND OBJECTIVE BOX
Patient is a 63y old  Female who presents with a chief complaint of L MCA CVA with expressive aphasia, right hemiparesis, and right facial droop (12 Mar 2024 13:20)    HPI:  This ia a 64 YO female RH dominant with PMH HTN, HLD, DM2, CVA (10/2023), who presented to  [facility] ED on 3/1/24  following a fall at home. According to the partner, Connie, the patient was in her usual state of health up until 5 days prior to admission when she was noted to have right sided facial, arm and leg weakness, aphasia and episodes of confusion. She took ASA 81mg with improvement of symptoms; pt was able to ambulate and function at home but felt weak. On the day prior to admission, the patient fell prompting her partner to bring her to the ED.    On arrival, pt noted to have dense right sided facial droop and right sided hemiparesis and aphasia.  MRI brain demonstrated multiple small acute infarctions in the left MCA distribution and chronic ischemic changes from prior strokes. CTA head/neck: negative for LVO occlusion. Patient was evaluated by neurology, and recommended for ASA & plavix x 3 months.    Patient was evaluated by PM&R and therapy for functional deficits, gait/ADL impairments and acute rehabilitation was recommended. Patient was discharged to  [facility] on 3/4/24. (04 Mar 2024 13:15)    SUBJECTIVE:   Patient seen and examined in therapy. Therapist had reported patient complaining of calf pain. Duplex b/l LE last week negative. Likely sore muscles from therapy exercises. No further complaints of dysuria. Last BM yesterday, however partner notes stool is still hard to pass. Patient has been drinking prune juice and getting miralax/senna. Advised that we will increase the miralax to BID. Encouraged oral hydration.       PAST MEDICAL & SURGICAL HISTORY:  HTN (hypertension)  DM (diabetes mellitus)  CVA (cerebrovascular accident)    No significant past surgical history      MEDICATIONS  (STANDING):  amLODIPine   Tablet 10 milliGRAM(s) Oral daily  AQUAPHOR (petrolatum Ointment) 1 Application(s) Topical three times a day  aspirin enteric coated 81 milliGRAM(s) Oral daily  atorvastatin 80 milliGRAM(s) Oral at bedtime  clopidogrel Tablet 75 milliGRAM(s) Oral daily  dextrose 5%. 1000 milliLiter(s) (50 mL/Hr) IV Continuous <Continuous>  dextrose 50% Injectable 12.5 Gram(s) IV Push once  dextrose 50% Injectable 25 Gram(s) IV Push once  donepezil 5 milliGRAM(s) Oral <User Schedule>  enoxaparin Injectable 30 milliGRAM(s) SubCutaneous <User Schedule>  gabapentin 200 milliGRAM(s) Oral at bedtime  glucagon  Injectable 1 milliGRAM(s) IntraMuscular once  lisinopril 20 milliGRAM(s) Oral daily  nitrofurantoin monohydrate/macrocrystals (MACROBID) 100 milliGRAM(s) Oral two times a day  nystatin    Suspension 275284 Unit(s) Oral four times a day  potassium chloride   Powder 20 milliEquivalent(s) Oral daily  senna 2 Tablet(s) Oral at bedtime  sodium chloride 0.65% Nasal 1 Spray(s) Both Nostrils three times a day    MEDICATIONS  (PRN):  acetaminophen     Tablet .. 650 milliGRAM(s) Oral every 6 hours PRN Temp greater or equal to 38C (100.4F), Mild Pain (1 - 3)  aluminum hydroxide/magnesium hydroxide/simethicone Suspension 30 milliLiter(s) Oral every 4 hours PRN Dyspepsia  melatonin 3 milliGRAM(s) Oral at bedtime PRN Insomnia  ondansetron   Disintegrating Tablet 4 milliGRAM(s) Oral every 8 hours PRN Nausea and/or Vomiting  polyethylene glycol 3350 17 Gram(s) Oral daily PRN Constipation      Allergies    Allergy Status Unknown    Intolerances          VITALS  63y  Vital Signs Last 24 Hrs  T(C): 36.6 (15 Mar 2024 08:51), Max: 36.6 (15 Mar 2024 08:51)  T(F): 97.8 (15 Mar 2024 08:51), Max: 97.8 (15 Mar 2024 08:51)  HR: 80 (15 Mar 2024 08:51) (80 - 81)  BP: 111/73 (15 Mar 2024 08:51) (111/73 - 161/76)  BP(mean): --  RR: 16 (15 Mar 2024 08:51) (16 - 16)  SpO2: 97% (15 Mar 2024 08:51) (97% - 100%)    Parameters below as of 15 Mar 2024 08:51  Patient On (Oxygen Delivery Method): room air      RECENT LABS:                          12.0   13.47 )-----------( 311      ( 14 Mar 2024 07:26 )             36.6     03-14    139  |  100  |  18  ----------------------------<  243<H>  3.9   |  28  |  0.72    Ca    9.6      14 Mar 2024 07:26  Mg     1.8     -    TPro  7.6  /  Alb  3.8  /  TBili  1.6<H>  /  DBili  x   /  AST  21  /  ALT  36  /  AlkPhos  63  -    Urinalysis + Microscopic Examination (24 @ 16:50)   pH Urine: 6.0  Urine Appearance: Clear  Color: Dark Yellow  Specific Gravity: 1.016  Protein, Urine: Trace mg/dL  Glucose Qualitative, Urine: 250 mg/dL  Ketone - Urine: Negative mg/dL  Blood, Urine: Negative  Bilirubin: Negative  Urobilinogen: 1.0 mg/dL  Leukocyte Esterase Concentration: Moderate  Nitrite: Positive  White Blood Cell - Urine: 2 /HPF  Red Blood Cell - Urine: 0 /HPF  Bacteria: Few /HPF  Squamous Epithelial Cells: 0    Urinalysis Basic - ( 12 Mar 2024 18:00 )  Color: Orange / Appearance: Clear / S.022 / pH: x  Gluc: x / Ketone: Negative mg/dL  / Bili: Small / Urobili: 1.0 mg/dL   Blood: x / Protein: Trace mg/dL / Nitrite: Positive   Leuk Esterase: Small / RBC: 0 /HPF / WBC 2 /HPF   Sq Epi: x / Non Sq Epi: x / Bacteria: Few /HPF   Patient is a 63y old  Female who presents with a chief complaint of L MCA CVA with expressive aphasia, right hemiparesis, and right facial droop (12 Mar 2024 13:20)    HPI:  This ia a 62 YO female RH dominant with PMH HTN, HLD, DM2, CVA (10/2023), who presented to  [facility] ED on 3/1/24  following a fall at home. According to the partner, Connie, the patient was in her usual state of health up until 5 days prior to admission when she was noted to have right sided facial, arm and leg weakness, aphasia and episodes of confusion. She took ASA 81mg with improvement of symptoms; pt was able to ambulate and function at home but felt weak. On the day prior to admission, the patient fell prompting her partner to bring her to the ED.    On arrival, pt noted to have dense right sided facial droop and right sided hemiparesis and aphasia.  MRI brain demonstrated multiple small acute infarctions in the left MCA distribution and chronic ischemic changes from prior strokes. CTA head/neck: negative for LVO occlusion. Patient was evaluated by neurology, and recommended for ASA & plavix x 3 months.    Patient was evaluated by PM&R and therapy for functional deficits, gait/ADL impairments and acute rehabilitation was recommended. Patient was discharged to  [facility] on 3/4/24. (04 Mar 2024 13:15)    SUBJECTIVE:   Patient seen and examined in therapy. Therapist had reported patient complaining of calf pain. Duplex b/l LE last week negative. Likely sore muscles from therapy exercises. No further complaints of dysuria. Last BM yesterday, however partner notes stool is still hard to pass. Patient has been drinking prune juice and getting miralax/senna. Advised that we will increase the miralax to BID. Encouraged oral hydration.       PAST MEDICAL & SURGICAL HISTORY:  HTN (hypertension)  DM (diabetes mellitus)  CVA (cerebrovascular accident)    No significant past surgical history      MEDICATIONS  (STANDING):  amLODIPine   Tablet 10 milliGRAM(s) Oral daily  AQUAPHOR (petrolatum Ointment) 1 Application(s) Topical three times a day  aspirin enteric coated 81 milliGRAM(s) Oral daily  atorvastatin 80 milliGRAM(s) Oral at bedtime  clopidogrel Tablet 75 milliGRAM(s) Oral daily  dextrose 5%. 1000 milliLiter(s) (50 mL/Hr) IV Continuous <Continuous>  dextrose 50% Injectable 12.5 Gram(s) IV Push once  dextrose 50% Injectable 25 Gram(s) IV Push once  donepezil 5 milliGRAM(s) Oral <User Schedule>  enoxaparin Injectable 30 milliGRAM(s) SubCutaneous <User Schedule>  fluconAZOLE   Tablet 100 milliGRAM(s) Oral daily  gabapentin 200 milliGRAM(s) Oral at bedtime  glucagon  Injectable 1 milliGRAM(s) IntraMuscular once  lisinopril 20 milliGRAM(s) Oral daily  polyethylene glycol 3350 17 Gram(s) Oral daily  potassium chloride   Powder 20 milliEquivalent(s) Oral daily  senna 2 Tablet(s) Oral at bedtime  sodium chloride 0.65% Nasal 1 Spray(s) Both Nostrils three times a day    MEDICATIONS  (PRN):  acetaminophen     Tablet .. 650 milliGRAM(s) Oral every 6 hours PRN Temp greater or equal to 38C (100.4F), Mild Pain (1 - 3)  aluminum hydroxide/magnesium hydroxide/simethicone Suspension 30 milliLiter(s) Oral every 4 hours PRN Dyspepsia  melatonin 3 milliGRAM(s) Oral at bedtime PRN Insomnia  ondansetron   Disintegrating Tablet 4 milliGRAM(s) Oral every 8 hours PRN Nausea and/or Vomiting      Allergies    Allergy Status Unknown    Intolerances          VITALS  63y  Vital Signs Last 24 Hrs  T(C): 36.5 (18 Mar 2024 07:49), Max: 36.6 (17 Mar 2024 20:27)  T(F): 97.7 (18 Mar 2024 07:49), Max: 97.8 (17 Mar 2024 20:27)  HR: 68 (18 Mar 2024 07:49) (68 - 75)  BP: 145/66 (18 Mar 2024 07:49) (117/69 - 145/66)  BP(mean): --  RR: 15 (18 Mar 2024 07:49) (15 - 16)  SpO2: 99% (18 Mar 2024 07:49) (95% - 99%)    Parameters below as of 18 Mar 2024 07:49  Patient On (Oxygen Delivery Method): room air      RECENT LABS:  LABS:                          10.5   12.65 )-----------( 338      ( 18 Mar 2024 05:29 )             32.5     03-18    137  |  99  |  18  ----------------------------<  159<H>  4.7   |  29  |  0.89    Ca    9.7      18 Mar 2024 05:29  Mg     2.0     03-18    TPro  6.9  /  Alb  3.5  /  TBili  1.0  /  DBili  x   /  AST  24  /  ALT  39  /  AlkPhos  66  03-18      PHYSICAL EXAM  Gen - NAD, Comfortable  HEENT - (+) oral thrush  Neck - Supple, No limited ROM  Pulm - breathing comfortably on room air  Cardiovascular - warm and well perfused  Abdomen - Soft,  nontender  Extremities - No peripheral edema, (+) calf tenderness b/l but compartments are soft and compressible with no erythema/warmth  Neuro-     Cognitive - alert, follows simple commands but difficulty with motor planning     Communication - (+)dysarthria, mostly says yes/no but inconsistent at times     Cranial Nerves - right sided facial weakness     Motor - abducts right shoulder and extends right knee anti-gravity, otherwise difficulty with following commands for accurate motor testing, 5/5 strength on LUE/LLE     Tone - increased tone in RUE (MAS 2-3 in biceps, MAS 1+ in triceps)  Psychiatric - Mood stable, Affect WNL

## 2024-03-18 NOTE — PROGRESS NOTE ADULT - ASSESSMENT
63F with HTN, HLD, DM2, prior CVA, now in acute rehab after left ischemic MCA stroke    Ischemic MCA stroke  #Aphasic due to above  -c/w ASA and plavix  -c/w statin  -PT/OT/SLP  -c/w Aricept    #Essential HTN  On amlodipine 10  and [id number]  at inpatient goal. may titrate lisinopril to 40 if needed    #Oral candidiasis  Symptomatic  Started treatment with nystatin Mar5 - March 15  - discussed with pharmacy, okay to increase dosage to fluconazole 200mg qD  ( started 3/18). however, if thrush does not improve by 3/22, would consult ID for further management     #DM2  A1C with Estimated Average Glucose Result: 5.5 % (03-02-24 @ 06:25)  - start insulin sliding scale

## 2024-03-18 NOTE — PROGRESS NOTE ADULT - SUBJECTIVE AND OBJECTIVE BOX
Patient is a 63y old  Female who presents with a chief complaint of L MCA CVA with expressive aphasia, right hemiparesis, and right facial droop (18 Mar 2024 12:34)      Patient seen and examined at bedside.  - no events overnight, partner kristin at bedside.  has difficulty expressing her thoughts. partner reports difficulty swallowing and thrush is still ongoing. otherwise no n/v/abd pain/cough. tolerating po intake.   ALLERGIES:  Allergy Status Unknown    MEDICATIONS  (STANDING):  amLODIPine   Tablet 10 milliGRAM(s) Oral daily  AQUAPHOR (petrolatum Ointment) 1 Application(s) Topical three times a day  aspirin enteric coated 81 milliGRAM(s) Oral daily  atorvastatin 80 milliGRAM(s) Oral at bedtime  clopidogrel Tablet 75 milliGRAM(s) Oral daily  dextrose 5%. 1000 milliLiter(s) (50 mL/Hr) IV Continuous <Continuous>  dextrose 50% Injectable 12.5 Gram(s) IV Push once  dextrose 50% Injectable 25 Gram(s) IV Push once  donepezil 5 milliGRAM(s) Oral <User Schedule>  enoxaparin Injectable 30 milliGRAM(s) SubCutaneous <User Schedule>  gabapentin 200 milliGRAM(s) Oral at bedtime  glucagon  Injectable 1 milliGRAM(s) IntraMuscular once  lisinopril 20 milliGRAM(s) Oral daily  polyethylene glycol 3350 17 Gram(s) Oral two times a day  potassium chloride   Powder 20 milliEquivalent(s) Oral daily  senna 2 Tablet(s) Oral at bedtime  sodium chloride 0.65% Nasal 1 Spray(s) Both Nostrils three times a day    MEDICATIONS  (PRN):  acetaminophen     Tablet .. 650 milliGRAM(s) Oral every 6 hours PRN Temp greater or equal to 38C (100.4F), Mild Pain (1 - 3)  aluminum hydroxide/magnesium hydroxide/simethicone Suspension 30 milliLiter(s) Oral every 4 hours PRN Dyspepsia  melatonin 3 milliGRAM(s) Oral at bedtime PRN Insomnia  ondansetron   Disintegrating Tablet 4 milliGRAM(s) Oral every 8 hours PRN Nausea and/or Vomiting    Vital Signs Last 24 Hrs  T(F): 97.7 (18 Mar 2024 07:49), Max: 97.8 (17 Mar 2024 20:27)  HR: 68 (18 Mar 2024 07:49) (68 - 75)  BP: 145/66 (18 Mar 2024 07:49) (117/69 - 145/66)  RR: 15 (18 Mar 2024 07:49) (15 - 16)  SpO2: 99% (18 Mar 2024 07:49) (95% - 99%)  I&O's Summary        PHYSICAL EXAM:  General: NAD, A/O x 3  ENT: MMM, no scleral icterus. thrush present  Neck: Supple, No JVD  Lungs: Clear to auscultation bilaterally, no wheezes, rales, rhonchi  Cardio: RRR, S1/S2, No murmurs  Abdomen: Soft, Nontender, Nondistended; Bowel sounds present  Extremities: No calf tenderness, No pitting edema    LABS:                        10.5   12.65 )-----------( 338      ( 18 Mar 2024 05:29 )             32.5       03-18    137  |  99  |  18  ----------------------------<  159  4.7   |  29  |  0.89    Ca    9.7      18 Mar 2024 05:29  Mg     2.0     03-18    TPro  6.9  /  Alb  3.5  /  TBili  1.0  /  DBili  x   /  AST  24  /  ALT  39  /  AlkPhos  66  03-18                03-02 Chol 165 mg/dL LDL -- HDL 54 mg/dL Trig 92 mg/dL                  Urinalysis Basic - ( 18 Mar 2024 05:29 )    Color: x / Appearance: x / SG: x / pH: x  Gluc: 159 mg/dL / Ketone: x  / Bili: x / Urobili: x   Blood: x / Protein: x / Nitrite: x   Leuk Esterase: x / RBC: x / WBC x   Sq Epi: x / Non Sq Epi: x / Bacteria: x        Culture - Urine (collected 13 Mar 2024 17:50)  Source: Clean Catch Clean Catch (Midstream)  Final Report (14 Mar 2024 19:49):    <10,000 CFU/mL Normal Urogenital Raisa          RADIOLOGY & ADDITIONAL TESTS:    Care Discussed with Consultants/Other Providers: physiatry team

## 2024-03-18 NOTE — CHART NOTE - NSCHARTNOTEFT_GEN_A_CORE
Nutrition Follow Up Note  Source: Medical Record [X] Patient [X] Family [ ]         Diet: Minced and moist, Glucerna BID  Pt tolerating diet with fair-good PO intake, eating % Per nursing flowsheets. Pt observed during meal rounds with good PO intake, has good acceptance of Glucerna BID (provides 220 kcal, 10 g protein/serving). Pt is receiving 2x protein @ meals per request. No digestive issues reported at this time.    Enteral/Parenteral Nutrition: N/A    Current Weight: 87.3 lbs (3-4)      Pertinent Medications: MEDICATIONS  (STANDING):  amLODIPine   Tablet 10 milliGRAM(s) Oral daily  AQUAPHOR (petrolatum Ointment) 1 Application(s) Topical three times a day  aspirin enteric coated 81 milliGRAM(s) Oral daily  atorvastatin 80 milliGRAM(s) Oral at bedtime  clopidogrel Tablet 75 milliGRAM(s) Oral daily  dextrose 5%. 1000 milliLiter(s) (50 mL/Hr) IV Continuous <Continuous>  dextrose 50% Injectable 12.5 Gram(s) IV Push once  dextrose 50% Injectable 25 Gram(s) IV Push once  donepezil 5 milliGRAM(s) Oral <User Schedule>  enoxaparin Injectable 30 milliGRAM(s) SubCutaneous <User Schedule>  fluconAZOLE   Tablet 100 milliGRAM(s) Oral daily  gabapentin 200 milliGRAM(s) Oral at bedtime  glucagon  Injectable 1 milliGRAM(s) IntraMuscular once  lisinopril 20 milliGRAM(s) Oral daily  polyethylene glycol 3350 17 Gram(s) Oral two times a day  potassium chloride   Powder 20 milliEquivalent(s) Oral daily  senna 2 Tablet(s) Oral at bedtime  sodium chloride 0.65% Nasal 1 Spray(s) Both Nostrils three times a day    MEDICATIONS  (PRN):  acetaminophen     Tablet .. 650 milliGRAM(s) Oral every 6 hours PRN Temp greater or equal to 38C (100.4F), Mild Pain (1 - 3)  aluminum hydroxide/magnesium hydroxide/simethicone Suspension 30 milliLiter(s) Oral every 4 hours PRN Dyspepsia  melatonin 3 milliGRAM(s) Oral at bedtime PRN Insomnia  ondansetron   Disintegrating Tablet 4 milliGRAM(s) Oral every 8 hours PRN Nausea and/or Vomiting      Pertinent Labs:  03-18 Na137 mmol/L Glu 159 mg/dL<H> K+ 4.7 mmol/L Cr  0.89 mg/dL BUN 18 mg/dL 03-18 Alb 3.5 g/dL 03-02 Chol 165 mg/dL LDL --    HDL 54 mg/dL Trig 92 mg/dL        Skin: skin intact Per nursing flowsheets     Edema: No edema per nursing flow sheets     Last BM: on 3/17 per nursing flow sheets    Estimated Needs:   [X] No Change since Previous Assessment  [ ] Recalculated:     Previous Nutrition Diagnosis:   1. Severe malnutrition  2. Swallowing difficulty      Nutrition Diagnosis is [X] Ongoing  - receiving glucerna BID (provides 220 kcal, 10 g protein/serving), 2x protein @ meals, SLP following       New Nutrition Diagnosis: [X] Not Applicable  [ ] Inadequate Protein Energy Intake   [ ] Inadequate Oral Intake   [ ] Excessive Energy Intake   [ ] Increased Nutrient Needs   [ ] Obesity   [ ] Altered GI Function   [ ] Unintended Weight Loss   [ ] Food & Nutrition Related Knowledge Deficit  [ ] Limited Adherence to nutrition related recommendations   [ ] Malnutrition      Interventions:   1. Recommend continuing with current plan of care  2. Encourage PO intake  3. Follow SLP recommendations    Monitoring & Evaluation:   [X] Weights   [X] PO Intake   [X] Follow Up (Per Protocol)  [X] Tolerance to Diet Prescription   [X] Other: Labs    RD Remains Available.  Callie Faith RD

## 2024-03-19 LAB
GLUCOSE BLDC GLUCOMTR-MCNC: 162 MG/DL — HIGH (ref 70–99)
GLUCOSE BLDC GLUCOMTR-MCNC: 227 MG/DL — HIGH (ref 70–99)
GLUCOSE BLDC GLUCOMTR-MCNC: 231 MG/DL — HIGH (ref 70–99)
GLUCOSE BLDC GLUCOMTR-MCNC: 239 MG/DL — HIGH (ref 70–99)

## 2024-03-19 PROCEDURE — 99232 SBSQ HOSP IP/OBS MODERATE 35: CPT

## 2024-03-19 RX ADMIN — CLOPIDOGREL BISULFATE 75 MILLIGRAM(S): 75 TABLET, FILM COATED ORAL at 12:05

## 2024-03-19 RX ADMIN — Medication 1 APPLICATION(S): at 12:09

## 2024-03-19 RX ADMIN — Medication 1 SPRAY(S): at 21:16

## 2024-03-19 RX ADMIN — LISINOPRIL 20 MILLIGRAM(S): 2.5 TABLET ORAL at 06:47

## 2024-03-19 RX ADMIN — Medication 10 MILLIGRAM(S): at 12:58

## 2024-03-19 RX ADMIN — Medication 20 MILLIEQUIVALENT(S): at 12:06

## 2024-03-19 RX ADMIN — Medication 1 SPRAY(S): at 06:47

## 2024-03-19 RX ADMIN — SENNA PLUS 2 TABLET(S): 8.6 TABLET ORAL at 21:15

## 2024-03-19 RX ADMIN — AMLODIPINE BESYLATE 10 MILLIGRAM(S): 2.5 TABLET ORAL at 06:47

## 2024-03-19 RX ADMIN — GABAPENTIN 200 MILLIGRAM(S): 400 CAPSULE ORAL at 21:15

## 2024-03-19 RX ADMIN — Medication 1 APPLICATION(S): at 06:54

## 2024-03-19 RX ADMIN — FLUCONAZOLE 200 MILLIGRAM(S): 150 TABLET ORAL at 12:06

## 2024-03-19 RX ADMIN — Medication 1 APPLICATION(S): at 21:15

## 2024-03-19 RX ADMIN — DONEPEZIL HYDROCHLORIDE 5 MILLIGRAM(S): 10 TABLET, FILM COATED ORAL at 08:07

## 2024-03-19 RX ADMIN — ENOXAPARIN SODIUM 30 MILLIGRAM(S): 100 INJECTION SUBCUTANEOUS at 20:06

## 2024-03-19 RX ADMIN — Medication 81 MILLIGRAM(S): at 12:05

## 2024-03-19 RX ADMIN — POLYETHYLENE GLYCOL 3350 17 GRAM(S): 17 POWDER, FOR SOLUTION ORAL at 16:36

## 2024-03-19 RX ADMIN — Medication 2: at 12:12

## 2024-03-19 RX ADMIN — Medication 1: at 16:38

## 2024-03-19 RX ADMIN — ATORVASTATIN CALCIUM 80 MILLIGRAM(S): 80 TABLET, FILM COATED ORAL at 21:16

## 2024-03-19 RX ADMIN — POLYETHYLENE GLYCOL 3350 17 GRAM(S): 17 POWDER, FOR SOLUTION ORAL at 06:47

## 2024-03-19 RX ADMIN — Medication 1 SPRAY(S): at 12:09

## 2024-03-19 RX ADMIN — Medication 2: at 08:13

## 2024-03-19 NOTE — DISCHARGE NOTE PROVIDER - HOSPITAL COURSE
HPI:  This ia a 64 YO female RH dominant with PMH HTN, HLD, DM2, CVA (10/2023), who presented to  [facility] ED on 3/1/24  following a fall at home. According to the partner, Connie, the patient was in her usual state of health up until 5 days prior to admission when she was noted to have right sided facial, arm and leg weakness, aphasia and episodes of confusion. She took ASA 81mg with improvement of symptoms; pt was able to ambulate and function at home but felt weak. On the day prior to admission, the patient fell prompting her partner to bring her to the ED.    On arrival, pt noted to have dense right sided facial droop and right sided hemiparesis and aphasia.  MRI brain demonstrated multiple small acute infarctions in the left MCA distribution and chronic ischemic changes from prior strokes. CTA head/neck: negative for LVO occlusion. Patient was evaluated by neurology, and recommended for ASA & plavix x 3 months.    Patient was evaluated by PM&R and therapy for functional deficits, gait/ADL impairments and acute rehabilitation was recommended. Patient was discharged to  IRF on 3/4/24. (04 Mar 2024 13:15)    REHAB COURSE:  Patient participated in daily therapies and made good functional gains.  Rehab course notable for RUE neuropathic pain, started on gabapentin 200 mg QHS with good relief. She was started on elbow comfy splint for RUE spasticity and was started on resting arm splint. Patient developed dysuria but urine cx negative, received 3 day course of Macrobid and pyridium. Patient's oral thrush was not improved with nystatin suspension so was transitioned to diflucan. LE duplex for transient calf pain negative.     Patient tolerated course of inpatient PT/OT/SLP rehab with significant functional improvements. Patient seen and examined on day of discharge.  Medications, medication side effects, and discharge instructions were reviewed with the patient, who expressed understanding of all information.  Patient was medically and functionally optimized and cleared for discharge.     HPI:  This ia a 62 YO female RH dominant with PMH HTN, HLD, DM2, CVA (10/2023), who presented to  [facility] ED on 3/1/24  following a fall at home. According to the partner, Connie, the patient was in her usual state of health up until 5 days prior to admission when she was noted to have right sided facial, arm and leg weakness, aphasia and episodes of confusion. She took ASA 81mg with improvement of symptoms; pt was able to ambulate and function at home but felt weak. On the day prior to admission, the patient fell prompting her partner to bring her to the ED.    On arrival, pt noted to have dense right sided facial droop and right sided hemiparesis and aphasia.  MRI brain demonstrated multiple small acute infarctions in the left MCA distribution and chronic ischemic changes from prior strokes. CTA head/neck: negative for LVO occlusion. Patient was evaluated by neurology, and recommended for ASA & plavix x 3 months.    Patient was evaluated by PM&R and therapy for functional deficits, gait/ADL impairments and acute rehabilitation was recommended. Patient was discharged to  IRF on 3/4/24. (04 Mar 2024 13:15)    REHAB COURSE:  Patient participated in daily therapies and made good functional gains.  Rehab course notable for RUE neuropathic pain, started on gabapentin 200 mg QHS with good relief. She was started on elbow comfy splint for RUE spasticity. Patient developed dysuria but urine cx negative, received 3 day course of Macrobid and pyridium. Patient's oral thrush was not improved with nystatin suspension so was transitioned to diflucan. LE duplex for transient calf pain negative. She had constipation improved with senna, miralax, prune juice, and bisacodyl suppository.     Patient tolerated course of inpatient PT/OT/SLP rehab with significant functional improvements. However, patient requires discharge to subacute rehab for continued therapy prior to safe transition to home. Patient seen and examined on day of discharge.  Medications, medication side effects, and discharge instructions were reviewed with the patient, who expressed understanding of all information.  Patient was medically and functionally optimized and cleared for discharge.     HPI:  This ia a 64 YO female RH dominant with PMH HTN, HLD, DM2, CVA (10/2023), who presented to  [facility] ED on 3/1/24  following a fall at home. According to the partner, Connie, the patient was in her usual state of health up until 5 days prior to admission when she was noted to have right sided facial, arm and leg weakness, aphasia and episodes of confusion. She took ASA 81mg with improvement of symptoms; pt was able to ambulate and function at home but felt weak. On the day prior to admission, the patient fell prompting her partner to bring her to the ED.    On arrival, pt noted to have dense right sided facial droop and right sided hemiparesis and aphasia.  MRI brain demonstrated multiple small acute infarctions in the left MCA distribution and chronic ischemic changes from prior strokes. CTA head/neck: negative for LVO occlusion. Patient was evaluated by neurology, and recommended for ASA & plavix x 3 months.    Patient was evaluated by PM&R and therapy for functional deficits, gait/ADL impairments and acute rehabilitation was recommended. Patient was discharged to  IRF on 3/4/24. (04 Mar 2024 13:15)    REHAB COURSE:  Patient participated in daily therapies and made good functional gains.  Rehab course notable for RUE neuropathic pain, started on gabapentin 200 mg QHS with good relief. She was started on elbow comfy splint for RUE spasticity. Patient developed dysuria but urine cx negative, received 3 day course of Macrobid and pyridium. Patient's oral thrush was not improved with nystatin suspension so was transitioned to diflucan. Completed course of diflucan with resolution of thrush. LE duplex for transient calf pain negative. She had constipation improved with senna, miralax, prune juice, and bisacodyl suppository.     Patient tolerated course of inpatient PT/OT/SLP rehab with significant functional improvements. However, patient requires discharge to subacute rehab for continued therapy prior to safe transition to home. Patient seen and examined on day of discharge.  Medications, medication side effects, and discharge instructions were reviewed with the patient, who expressed understanding of all information.  Patient was medically and functionally optimized and cleared for discharge.     HPI:  This ia a 62 YO female RH dominant with PMH HTN, HLD, DM2, CVA (10/2023), who presented to  [facility] ED on 3/1/24  following a fall at home. According to the partner, Connie, the patient was in her usual state of health up until 5 days prior to admission when she was noted to have right sided facial, arm and leg weakness, aphasia and episodes of confusion. She took ASA 81mg with improvement of symptoms; pt was able to ambulate and function at home but felt weak. On the day prior to admission, the patient fell prompting her partner to bring her to the ED.    On arrival, pt noted to have dense right sided facial droop and right sided hemiparesis and aphasia.  MRI brain demonstrated multiple small acute infarctions in the left MCA distribution and chronic ischemic changes from prior strokes. CTA head/neck: negative for LVO occlusion. Patient was evaluated by neurology, and recommended for ASA & plavix x 3 months.    Patient was evaluated by PM&R and therapy for functional deficits, gait/ADL impairments and acute rehabilitation was recommended. Patient was discharged to  IRF on 3/4/24. (04 Mar 2024 13:15)    REHAB COURSE:  Patient participated in daily therapies and made good functional gains.  Rehab course notable for RUE neuropathic pain, started on gabapentin 200 mg QHS with good relief. She was started on elbow comfy splint for RUE spasticity. Discussed potentially starting medication (Baclofen vs Robaxin vs Tizanidine) for spasticity but deferred at this time due to partners concern for sedation. Patient developed dysuria but urine cx negative, received 3 day course of Macrobid and pyridium. Patient's oral thrush was not improved with nystatin suspension so was transitioned to diflucan. Completed course of diflucan with resolution of thrush. LE duplex for transient calf pain negative. She had constipation improved with senna, Miralax, prune juice, and bisacodyl suppository.     Patient tolerated course of inpatient PT/OT/SLP rehab with significant functional improvements. However, patient requires discharge to subacute rehab for continued therapy prior to safe transition to home. Patient seen and examined on day of discharge.  Medications, medication side effects, and discharge instructions were reviewed with the patient, who expressed understanding of all information.  Patient was medically and functionally optimized and cleared for discharge.     HPI:  This ia a 62 YO female RH dominant with PMH HTN, HLD, DM2, CVA (10/2023), who presented to  [facility] ED on 3/1/24  following a fall at home. According to the partner, Connie, the patient was in her usual state of health up until 5 days prior to admission when she was noted to have right sided facial, arm and leg weakness, aphasia and episodes of confusion. She took ASA 81mg with improvement of symptoms; pt was able to ambulate and function at home but felt weak. On the day prior to admission, the patient fell prompting her partner to bring her to the ED.    On arrival, pt noted to have dense right sided facial droop and right sided hemiparesis and aphasia.  MRI brain demonstrated multiple small acute infarctions in the left MCA distribution and chronic ischemic changes from prior strokes. CTA head/neck: negative for LVO occlusion. Patient was evaluated by neurology, and recommended for ASA & plavix x 3 months.    Patient was evaluated by PM&R and therapy for functional deficits, gait/ADL impairments and acute rehabilitation was recommended. Patient was discharged to  IRF on 3/4/24. (04 Mar 2024 13:15)    REHAB COURSE:  Patient participated in daily therapies and made good functional gains.  Rehab course notable for RUE neuropathic pain, started on gabapentin 200 mg QHS with good relief. She was started on elbow comfy splint for RUE spasticity. Discussed potentially starting medication (Baclofen vs Robaxin vs Tizanidine) for spasticity but deferred at this time due to partners concern for sedation. Right elbow comfy splint was applied for positioning and stretch; she did not tolerate right resting hand splint. Patient developed dysuria with + UA but urine cx negative, received 3 day course of Macrobid and pyridium. Patient's oral thrush was not improved with nystatin suspension so was transitioned to diflucan. Completed course of diflucan with resolution of thrush. LE duplex for transient calf pain negative. She had constipation improved with senna, Miralax, prune juice, and bisacodyl suppository.     Patient tolerated course of inpatient PT/OT/SLP rehab with significant functional improvements. However, patient requires discharge to subacute rehab for continued therapy prior to safe transition to home. Patient seen and examined on day of discharge.  Medications, medication side effects, and discharge instructions were reviewed with the patient, who expressed understanding of all information.  Patient was medically and functionally optimized and cleared for discharge.

## 2024-03-19 NOTE — DISCHARGE NOTE PROVIDER - PROVIDER TOKENS
PROVIDER:[TOKEN:[[id number]]] PROVIDER:[TOKEN:[[id number]]],PROVIDER:[TOKEN:[[id number]]]

## 2024-03-19 NOTE — PROGRESS NOTE ADULT - ASSESSMENT
63F with HTN, HLD, DM2, prior CVA, now in acute rehab after left ischemic MCA stroke    Ischemic MCA stroke  #Aphasic due to above  -c/w ASA and plavix  -c/w statin  -PT/OT/SLP  -c/w Aricept    #Essential HTN  - On amlodipine 10  - and [id number]  - at inpatient goal. may titrate lisinopril to 40 if needed (if SBP persistently >140s)    #Oral candidiasis  Symptomatic  Started treatment with nystatin Mar5 - March 15  - discussed with pharmacy on 3/18, okay to increase dosage to fluconazole 200mg qD  ( started 3/18). however, if thrush does not improve by 3/22, would consult ID for further management     #DM2  A1C with Estimated Average Glucose Result: 5.5 % (03-02-24 @ 06:25)  - start insulin sliding scale

## 2024-03-19 NOTE — PROGRESS NOTE ADULT - SUBJECTIVE AND OBJECTIVE BOX
Patient is a 63y old  Female who presents with a chief complaint of L MCA CVA with expressive aphasia, right hemiparesis, and right facial droop (12 Mar 2024 13:20)    HPI:  This ia a 62 YO female RH dominant with PMH HTN, HLD, DM2, CVA (10/2023), who presented to  [facility] ED on 3/1/24  following a fall at home. According to the partner, Connie, the patient was in her usual state of health up until 5 days prior to admission when she was noted to have right sided facial, arm and leg weakness, aphasia and episodes of confusion. She took ASA 81mg with improvement of symptoms; pt was able to ambulate and function at home but felt weak. On the day prior to admission, the patient fell prompting her partner to bring her to the ED.    On arrival, pt noted to have dense right sided facial droop and right sided hemiparesis and aphasia.  MRI brain demonstrated multiple small acute infarctions in the left MCA distribution and chronic ischemic changes from prior strokes. CTA head/neck: negative for LVO occlusion. Patient was evaluated by neurology, and recommended for ASA & plavix x 3 months.    Patient was evaluated by PM&R and therapy for functional deficits, gait/ADL impairments and acute rehabilitation was recommended. Patient was discharged to  IRF on 3/4/24. (04 Mar 2024 13:15)    SUBJECTIVE:   Patient seen and examined at bedside. Last BM 3/17 although continued difficulty passing stool. Agreeable to suppository today. Thrush improved today on increased dose of fluconazole.      PAST MEDICAL & SURGICAL HISTORY:  HTN (hypertension)  DM (diabetes mellitus)  CVA (cerebrovascular accident)    No significant past surgical history      MEDICATIONS  (STANDING):  MEDICATIONS  (STANDING):  amLODIPine   Tablet 10 milliGRAM(s) Oral daily  AQUAPHOR (petrolatum Ointment) 1 Application(s) Topical three times a day  aspirin enteric coated 81 milliGRAM(s) Oral daily  atorvastatin 80 milliGRAM(s) Oral at bedtime  bisacodyl Suppository 10 milliGRAM(s) Rectal once  clopidogrel Tablet 75 milliGRAM(s) Oral daily  dextrose 5%. 1000 milliLiter(s) (50 mL/Hr) IV Continuous <Continuous>  dextrose 5%. 1000 milliLiter(s) (100 mL/Hr) IV Continuous <Continuous>  dextrose 50% Injectable 25 Gram(s) IV Push once  dextrose 50% Injectable 12.5 Gram(s) IV Push once  dextrose 50% Injectable 25 Gram(s) IV Push once  donepezil 5 milliGRAM(s) Oral <User Schedule>  enoxaparin Injectable 30 milliGRAM(s) SubCutaneous <User Schedule>  fluconAZOLE   Tablet 200 milliGRAM(s) Oral daily  gabapentin 200 milliGRAM(s) Oral at bedtime  glucagon  Injectable 1 milliGRAM(s) IntraMuscular once  insulin lispro (ADMELOG) corrective regimen sliding scale   SubCutaneous three times a day before meals  insulin lispro (ADMELOG) corrective regimen sliding scale   SubCutaneous at bedtime  lisinopril 20 milliGRAM(s) Oral daily  polyethylene glycol 3350 17 Gram(s) Oral two times a day  potassium chloride   Powder 20 milliEquivalent(s) Oral daily  senna 2 Tablet(s) Oral at bedtime  sodium chloride 0.65% Nasal 1 Spray(s) Both Nostrils three times a day    MEDICATIONS  (PRN):  acetaminophen     Tablet .. 650 milliGRAM(s) Oral every 6 hours PRN Temp greater or equal to 38C (100.4F), Mild Pain (1 - 3)  aluminum hydroxide/magnesium hydroxide/simethicone Suspension 30 milliLiter(s) Oral every 4 hours PRN Dyspepsia  dextrose Oral Gel 15 Gram(s) Oral once PRN Blood Glucose LESS THAN 70 milliGRAM(s)/deciliter  melatonin 3 milliGRAM(s) Oral at bedtime PRN Insomnia  ondansetron   Disintegrating Tablet 4 milliGRAM(s) Oral every 8 hours PRN Nausea and/or Vomiting        Allergies    Allergy Status Unknown    Intolerances          VITALS  63y  Vital Signs Last 24 Hrs  T(C): 36.6 (19 Mar 2024 08:51), Max: 36.6 (18 Mar 2024 20:36)  T(F): 97.8 (19 Mar 2024 08:51), Max: 97.8 (18 Mar 2024 20:36)  HR: 69 (19 Mar 2024 08:51) (62 - 75)  BP: 134/77 (19 Mar 2024 08:51) (119/67 - 147/92)  BP(mean): --  RR: 15 (19 Mar 2024 08:51) (15 - 15)  SpO2: 99% (19 Mar 2024 08:51) (99% - 99%)    Parameters below as of 19 Mar 2024 08:51  Patient On (Oxygen Delivery Method): room air        RECENT LABS:  LABS:                          10.5   12.65 )-----------( 338      ( 18 Mar 2024 05:29 )             32.5     03-18    137  |  99  |  18  ----------------------------<  159<H>  4.7   |  29  |  0.89    Ca    9.7      18 Mar 2024 05:29  Mg     2.0     03-18    TPro  6.9  /  Alb  3.5  /  TBili  1.0  /  DBili  x   /  AST  24  /  ALT  39  /  AlkPhos  66  03-18      PHYSICAL EXAM  Gen - NAD, Comfortable  HEENT - (+) oral thrush improved  Neck - Supple, No limited ROM  Pulm - breathing comfortably on room air  Cardiovascular - warm and well perfused  Abdomen - Soft, nontender  Extremities - No peripheral edema, no calf tenderness  Neuro-     Cognitive - alert, follows simple commands but difficulty with motor planning     Communication - (+)dysarthria, mostly says yes/no but inconsistent at times     Cranial Nerves - right sided facial weakness     Motor - abducts right shoulder and extends right knee anti-gravity, otherwise difficulty with following commands for accurate motor testing, 5/5 strength on LUE/LLE     Tone - increased tone in RUE (MAS 2-3 in biceps, MAS 1+ in triceps)  Psychiatric - Mood stable, Affect WNL

## 2024-03-19 NOTE — DISCHARGE NOTE PROVIDER - CARE PROVIDERS DIRECT ADDRESSES
,DirectAddress_Unknown ,DirectAddress_Unknown,[email or web address]

## 2024-03-19 NOTE — PROGRESS NOTE ADULT - ATTENDING COMMENTS
Pt. seen with resident.  Agree with documentation above as per resident with amendments made as appropriate. Patient medically stable. Making progress towards rehab goals.     Left MCA infarct  Bowel Regimen/Constipation  - Senna QHS  - Miralax QD --> BID (3/18)  - reports good relief with prune juice in past, dietary aware to add to tray  - encouraged oral hydration  - bisacodyl suppository 3/19

## 2024-03-19 NOTE — DISCHARGE NOTE PROVIDER - CARE PROVIDER_API CALL
Jacobo Miramontes  Physical/Rehab Medicine  [address] [place] [id number]  Phone: [phone]  Fax: [phone]  Follow Up Time:    David Enrique)  Neurology  [address]  [place] [address]  Phone: [phone]  Fax: ()-  Follow Up Time:     Nicole León  Physical/Rehab Medicine  [address] [place] [address]  Phone: [phone]  Fax: [phone]  Follow Up Time:

## 2024-03-19 NOTE — PROGRESS NOTE ADULT - SUBJECTIVE AND OBJECTIVE BOX
Patient is a 63y old  Female who presents with a chief complaint of L MCA CVA with expressive aphasia, right hemiparesis, and right facial droop (19 Mar 2024 10:53)      Patient seen and examined at bedside.  - no nausea, vomiting, headaches, shortness of breath. partner kristin at bedside. states thrush overall has improved and there is less discomfort in her mouth. Currently denies any shortness of breath, cough, chest pain when swallowing, pain with swallowing, reflux, headaches, diarrhea or constipation.     ALLERGIES:  Allergy Status Unknown    MEDICATIONS  (STANDING):  amLODIPine   Tablet 10 milliGRAM(s) Oral daily  AQUAPHOR (petrolatum Ointment) 1 Application(s) Topical three times a day  aspirin enteric coated 81 milliGRAM(s) Oral daily  atorvastatin 80 milliGRAM(s) Oral at bedtime  bisacodyl Suppository 10 milliGRAM(s) Rectal once  clopidogrel Tablet 75 milliGRAM(s) Oral daily  dextrose 5%. 1000 milliLiter(s) (100 mL/Hr) IV Continuous <Continuous>  dextrose 5%. 1000 milliLiter(s) (50 mL/Hr) IV Continuous <Continuous>  dextrose 50% Injectable 25 Gram(s) IV Push once  dextrose 50% Injectable 12.5 Gram(s) IV Push once  dextrose 50% Injectable 25 Gram(s) IV Push once  donepezil 5 milliGRAM(s) Oral <User Schedule>  enoxaparin Injectable 30 milliGRAM(s) SubCutaneous <User Schedule>  fluconAZOLE   Tablet 200 milliGRAM(s) Oral daily  gabapentin 200 milliGRAM(s) Oral at bedtime  glucagon  Injectable 1 milliGRAM(s) IntraMuscular once  insulin lispro (ADMELOG) corrective regimen sliding scale   SubCutaneous three times a day before meals  insulin lispro (ADMELOG) corrective regimen sliding scale   SubCutaneous at bedtime  lisinopril 20 milliGRAM(s) Oral daily  polyethylene glycol 3350 17 Gram(s) Oral two times a day  potassium chloride   Powder 20 milliEquivalent(s) Oral daily  senna 2 Tablet(s) Oral at bedtime  sodium chloride 0.65% Nasal 1 Spray(s) Both Nostrils three times a day    MEDICATIONS  (PRN):  acetaminophen     Tablet .. 650 milliGRAM(s) Oral every 6 hours PRN Temp greater or equal to 38C (100.4F), Mild Pain (1 - 3)  aluminum hydroxide/magnesium hydroxide/simethicone Suspension 30 milliLiter(s) Oral every 4 hours PRN Dyspepsia  dextrose Oral Gel 15 Gram(s) Oral once PRN Blood Glucose LESS THAN 70 milliGRAM(s)/deciliter  melatonin 3 milliGRAM(s) Oral at bedtime PRN Insomnia  ondansetron   Disintegrating Tablet 4 milliGRAM(s) Oral every 8 hours PRN Nausea and/or Vomiting    Vital Signs Last 24 Hrs  T(F): 97.8 (19 Mar 2024 08:51), Max: 97.8 (18 Mar 2024 20:36)  HR: 69 (19 Mar 2024 08:51) (62 - 75)  BP: 134/77 (19 Mar 2024 08:51) (119/67 - 147/92)  RR: 15 (19 Mar 2024 08:51) (15 - 15)  SpO2: 99% (19 Mar 2024 08:51) (99% - 99%)  I&O's Summary    18 Mar 2024 07:01  -  19 Mar 2024 07:00  --------------------------------------------------------  IN: 0 mL / OUT: 1 mL / NET: -1 mL          PHYSICAL EXAM:  General: NAD, A/O x 3  ENT: MMM, no scleral icterus. thrush present  Neck: Supple, No JVD  Lungs: Clear to auscultation bilaterally, no wheezes, rales, rhonchi  Cardio: RRR, S1/S2, No murmurs  Abdomen: Soft, Nontender, Nondistended; Bowel sounds present  Extremities: No calf tenderness, No pitting edema    LABS:                        10.5   12.65 )-----------( 338      ( 18 Mar 2024 05:29 )             32.5       03-18    137  |  99  |  18  ----------------------------<  159  4.7   |  29  |  0.89    Ca    9.7      18 Mar 2024 05:29  Mg     2.0     03-18    TPro  6.9  /  Alb  3.5  /  TBili  1.0  /  DBili  x   /  AST  24  /  ALT  39  /  AlkPhos  66  03-18                03-02 Chol 165 mg/dL LDL -- HDL 54 mg/dL Trig 92 mg/dL              POCT Blood Glucose.: 227 mg/dL (19 Mar 2024 12:10)  POCT Blood Glucose.: 239 mg/dL (19 Mar 2024 08:08)  POCT Blood Glucose.: 196 mg/dL (18 Mar 2024 21:06)      Urinalysis Basic - ( 18 Mar 2024 05:29 )    Color: x / Appearance: x / SG: x / pH: x  Gluc: 159 mg/dL / Ketone: x  / Bili: x / Urobili: x   Blood: x / Protein: x / Nitrite: x   Leuk Esterase: x / RBC: x / WBC x   Sq Epi: x / Non Sq Epi: x / Bacteria: x        Culture - Urine (collected 13 Mar 2024 17:50)  Source: Clean Catch Clean Catch (Midstream)  Final Report (14 Mar 2024 19:49):    <10,000 CFU/mL Normal Urogenital Raisa          RADIOLOGY & ADDITIONAL TESTS:    Care Discussed with Consultants/Other Providers:  PMR team

## 2024-03-19 NOTE — DISCHARGE NOTE PROVIDER - NSDCCPCAREPLAN_GEN_ALL_CORE_FT
PRINCIPAL DISCHARGE DIAGNOSIS  Diagnosis: Cerebrovascular accident (CVA)  Assessment and Plan of Treatment: You were admitted to [facility] due to weakness and functional impairments after your stroke. You participated in daily therapies and made functional gains throughout your stay. Please continue taking your medications as prescribed and monitor your blood pressure. Reduce fat, cholesterol and salt in your diet. Increase intake of fruits and vegetables. If you experience any symptoms of facial drooping, slurred speech, arm or leg weakness, severe headache, vision changes or any worsening symptoms, notify provider immediately and return to ER. Please follow up with neurology, PM&R, and your primary care doctor regarding your recent rehab admission.      SECONDARY DISCHARGE DIAGNOSES  Diagnosis: Oral thrush  Assessment and Plan of Treatment: You were initially started on nystatin suspension without improvement in your thrush. You were then transitioned to diflucan with good improvement.    Diagnosis: Dysuria  Assessment and Plan of Treatment: Your urine culture was negative but you continued to have pain with urination. You completed a course of antibiotics and pyridium with improvement in symptoms.     PRINCIPAL DISCHARGE DIAGNOSIS  Diagnosis: Cerebrovascular accident (CVA)  Assessment and Plan of Treatment: You were admitted to [facility] due to weakness and functional impairments after your stroke. You participated in daily therapies and made functional gains throughout your stay. Please continue taking your medications as prescribed and monitor your blood pressure. Reduce fat, cholesterol and salt in your diet. Increase intake of fruits and vegetables. If you experience any symptoms of facial drooping, slurred speech, arm or leg weakness, severe headache, vision changes or any worsening symptoms, notify provider immediately and return to ER. Please follow up with neurology, PM&R, and your primary care doctor regarding your recent rehab admission.      SECONDARY DISCHARGE DIAGNOSES  Diagnosis: Oral thrush  Assessment and Plan of Treatment: You were initially started on nystatin suspension without improvement. You were then transitioned to diflucan with resolution of oral thrush.    Diagnosis: Dysuria  Assessment and Plan of Treatment: Your urine culture was negative but you continued to have pain with urination. You completed a course of antibiotics and pyridium with improvement in symptoms.

## 2024-03-19 NOTE — DISCHARGE NOTE PROVIDER - NSDCMRMEDTOKEN_GEN_ALL_CORE_FT
amLODIPine 5 mg oral tablet: 1 tab(s) orally once a day  Aspir 81 oral delayed release tablet: 1 tab(s) orally once a day  atorvastatin 80 mg oral tablet: 1 tab(s) orally once a day (at bedtime)  clopidogrel 75 mg oral tablet: 1 tab(s) orally once a day  lisinopril 20 mg oral tablet: 1 tab(s) orally once a day  melatonin 3 mg oral tablet: 1 tab(s) orally once a day (at bedtime) As needed Insomnia   acetaminophen 325 mg oral tablet: 2 tab(s) orally every 6 hours As needed Temp greater or equal to 38C (100.4F), Mild Pain (1 - 3)  amLODIPine 5 mg oral tablet: 1 tab(s) orally once a day  Aspir 81 oral delayed release tablet: 1 tab(s) orally once a day  atorvastatin 80 mg oral tablet: 1 tab(s) orally once a day (at bedtime)  clopidogrel 75 mg oral tablet: 1 tab(s) orally once a day  enoxaparin: 40 milligram(s) subcutaneous once a day  fluconazole 200 mg oral tablet: 1 tab(s) orally once a day  gabapentin 100 mg oral capsule: 2 cap(s) orally once a day (at bedtime)  insulin lispro 100 units/mL injectable solution: 1 unit(s) injectable 3 times a day (before meals) ISS ACHS  insulin lispro 100 units/mL injectable solution: injectable once (at bedtime) ISS ACHS  lisinopril 20 mg oral tablet: 1 tab(s) orally once a day  melatonin 3 mg oral tablet: 1 tab(s) orally once a day (at bedtime) As needed Insomnia  metFORMIN 500 mg oral tablet: 1 tab(s) orally 2 times a day (with meals)   acetaminophen 325 mg oral tablet: 2 tab(s) orally every 6 hours As needed Temp greater or equal to 38C (100.4F), Mild Pain (1 - 3)  amLODIPine 10 mg oral tablet: 1 tab(s) orally once a day  aspirin 81 mg oral delayed release tablet: 1 tab(s) orally once a day  atorvastatin 80 mg oral tablet: 1 tab(s) orally once a day (at bedtime)  bisacodyl 10 mg rectal suppository: 1 suppository(ies) rectal once as needed for  constipation  clopidogrel 75 mg oral tablet: 1 tab(s) orally once a day  donepezil 5 mg oral tablet: 1 tab(s) orally once a day  enoxaparin: 40 milligram(s) subcutaneous once a day  fluconazole 200 mg oral tablet: 1 tab(s) orally once a day  gabapentin 100 mg oral capsule: 2 cap(s) orally once a day (at bedtime)  insulin lispro 100 units/mL injectable solution: 1 unit(s) injectable 3 times a day (before meals) ISS ACHS  insulin lispro 100 units/mL injectable solution: injectable once (at bedtime) ISS ACHS  lisinopril 20 mg oral tablet: 1 tab(s) orally once a day  melatonin 3 mg oral tablet: 1 tab(s) orally once a day (at bedtime) As needed Insomnia  metFORMIN 500 mg oral tablet: 1 tab(s) orally 2 times a day (with meals)  petrolatum topical ointment: 1 Apply topically to affected area 3 times a day  polyethylene glycol 3350 oral powder for reconstitution: 17 gram(s) orally 2 times a day  potassium chloride 20 mEq oral powder for reconstitution: 1 packet(s) orally once a day  senna leaf extract oral tablet: 2 tab(s) orally once a day (at bedtime)  sodium chloride 0.65% nasal spray: nasal 3 times a day   acetaminophen 325 mg oral tablet: 2 tab(s) orally every 6 hours As needed Temp greater or equal to 38C (100.4F), Mild Pain (1 - 3)  amLODIPine 10 mg oral tablet: 1 tab(s) orally once a day  aspirin 81 mg oral delayed release tablet: 1 tab(s) orally once a day  atorvastatin 80 mg oral tablet: 1 tab(s) orally once a day (at bedtime)  bisacodyl 10 mg rectal suppository: 1 suppository(ies) rectal once as needed for  constipation  clopidogrel 75 mg oral tablet: 1 tab(s) orally once a day  donepezil 5 mg oral tablet: 1 tab(s) orally once a day  enoxaparin: 40 milligram(s) subcutaneous once a day  fluconazole 200 mg oral tablet: 1 tab(s) orally once a day  gabapentin 100 mg oral capsule: 2 cap(s) orally once a day (at bedtime)  insulin lispro 100 units/mL injectable solution: 1 unit(s) injectable 3 times a day (before meals) ISS ACHS  insulin lispro 100 units/mL injectable solution: injectable once (at bedtime) ISS ACHS  lisinopril 20 mg oral tablet: 1 tab(s) orally once a day  melatonin 3 mg oral tablet: 1 tab(s) orally once a day (at bedtime) As needed Insomnia  metFORMIN 500 mg oral tablet: 1 tab(s) orally 2 times a day (with meals)  Multiple Vitamins oral tablet: 1 tab(s) orally once a day  petrolatum topical ointment: 1 Apply topically to affected area 3 times a day  polyethylene glycol 3350 oral powder for reconstitution: 17 gram(s) orally 2 times a day  potassium chloride 20 mEq oral powder for reconstitution: 1 packet(s) orally once a day  senna leaf extract oral tablet: 2 tab(s) orally once a day (at bedtime)  sodium chloride 0.65% nasal spray: nasal 3 times a day   acetaminophen 325 mg oral tablet: 2 tab(s) orally every 6 hours As needed Temp greater or equal to 38C (100.4F), Mild Pain (1 - 3)  amLODIPine 10 mg oral tablet: 1 tab(s) orally once a day  aspirin 81 mg oral delayed release tablet: 1 tab(s) orally once a day  atorvastatin 80 mg oral tablet: 1 tab(s) orally once a day (at bedtime)  bisacodyl 10 mg rectal suppository: 1 suppository(ies) rectal once as needed for  constipation  clopidogrel 75 mg oral tablet: 1 tab(s) orally once a day  donepezil 5 mg oral tablet: 1 tab(s) orally once a day  enoxaparin: 40 milligram(s) subcutaneous once a day  gabapentin 100 mg oral capsule: 2 cap(s) orally once a day (at bedtime)  insulin lispro 100 units/mL injectable solution: 1 unit(s) injectable 3 times a day (before meals) ISS ACHS  insulin lispro 100 units/mL injectable solution: injectable once (at bedtime) ISS ACHS  lisinopril 20 mg oral tablet: 1 tab(s) orally once a day  melatonin 3 mg oral tablet: 1 tab(s) orally once a day (at bedtime) As needed Insomnia  metFORMIN 500 mg oral tablet: 1 tab(s) orally 2 times a day (with meals)  Multiple Vitamins oral tablet: 1 tab(s) orally once a day  petrolatum topical ointment: 1 Apply topically to affected area 3 times a day  polyethylene glycol 3350 oral powder for reconstitution: 17 gram(s) orally 2 times a day  potassium chloride 20 mEq oral powder for reconstitution: 1 packet(s) orally once a day  senna leaf extract oral tablet: 2 tab(s) orally once a day (at bedtime)  sodium chloride 0.65% nasal spray: nasal 3 times a day

## 2024-03-19 NOTE — PROGRESS NOTE ADULT - ASSESSMENT
62 YO female with PMH HTN, HLD, DM2, CVA (10/2023), who presented to [facility] ED on 3/1/24 with right sided facial, right arm and leg weakness, aphasia and episodes of confusion as well as a fall. MRI brain demonstrated multiple small acute infarctions in the left MCA distribution     #Left MCA CVA with expressive aphasia, right hemiparesis, and right facial droop  - MRI 3/1: Multiple foci of diffusion restriction scattered throughout the left MCA vascular distribution, compatible with acute infarctions. Chronic lacunar infarctions in the right centrum semiovale/corona radiata and left hemipons. Encephalomalacia/gliotic change in the anterior right temporal lobe. Foci of susceptibility artifact in the right basal ganglia, likely sequela of prior micro-hemorrhages.  - ASA & plavix for 3 months, Discontinue ASA after 3 months (6/1/24)  - High dose statin  - Continue Aricept 5 mg Q8AM 3/9  - Continue Comprehensive Rehab Program: PT/OT/ST, 3hours daily and 5 days weekly  - right elbow comfy splint to improve extension. Patient does not tolerate right resting hand splint, will request OT to re-eval fit  - will benefit from AFO for right ankle DF assist and knee stabilization on dc  - continue to monitor RUE spasticity. Patient's partner defers start of antispasticity medication at this time due to concern of sedation  - recreation therapy  - Precautions: fall, aspiration, cardiac, DM, AMS    # dysphonia  - ENT 3/7: eval VC, difficulty tolerating laryngoscopy through nose, (+)dried nasal crusting, tolerated laryngoscopy through mouth, vocal cords mobile bilaterally, no pooling, no lesions  - Nasal saline spray TID    # HTN  - Amlodipine 10 mg daily  - Lisinopril 20mg daily  - BP (119/67 - 147/92) 3/19    # HLD  - Lipitor 80mg daily    # Diabetes Mellitus Type 2 without complications  - A1c 5.5 on 3/2/24  - FS and ISS discontinued on 3/6    # leukocytosis  - dysuria now resolved  - UA negative 3/10  - Repeat UA {3/12): +nit sm LE WBC 2. Patient is symptomatic, dysuria, frequency, also more agitated.   - Repeat UA (3/13) now with moderate LE and positive nitrite   - Urine cx (3/13): normal urogenital chelsi  - bladder scan with no signs of retention  - WBC 13.5 3/11--> 10.87 3/12 --> 13.47 3/14 --> 12.30 3/15 --> 12.65 (3/18)  - s/p Macrobid 100 mg BID (3/13 PM - 3/16 AM). per hospitalist, as patient symptomatic with leukocytosis but urine cx negative, treat for 3 days  - continue to monitor, CBC 3/21    # oral thrush  - not improved with nystatin suspension (3/5 - 3/15) --> d/c  - start diflucan 200 mg x1 (3/15), was on 100 mg QD (3/16-3/17) with worsening thrush, increased back to 200 mg QD on 3/18  - continue oral care    # hypokalemia (resolved)  - KCl 20 meq daily re-started 3/7, home medication   - BMP 3/21    # hypomagnesemia (resolved)  - Mg oxide supplemented   - Mg 1.5 (3/5) -->2.0 (3/18)    # Pain management  - Tylenol PRN  - gabapentin 200 mg QHS 3/4   - controlled    # Bowel Regimen/Constipation  - Senna QHS  - Miralax QD --> BID (3/18)  - reports good relief with prune juice in past, dietary aware to add to tray  - encouraged oral hydration  - bisacodyl suppository 3/19    # Sleep:   - Melatonin 3mg nightly PRN    # FEN   - Diet: upgraded to soft and bite sized (3/19)    # DVT ppx  - Bilateral LE duplex (3/8) NEGATIVE DVT  - Lovenox    # Case discussed in IDT rounds 3/13 (follow up):  - mod assist transfers, ambulates 70 feet wiht HHA and max assist ACE wrap DF assist right ankle, min assist-CG bed mobility, min assist transfers, emotionally labile, improved yes/no reliability, set up/supervision eating and grooming, mod assist LB dressing, mod assist toilet and shower trnasfers  - adjusted goals: supervision due to cog-language deficits, CG transfers from wheelchair level including to toilet, CG assist bADLs, WC level mobility on dc. Will need home modifications for WC including potential first floor set up, team to start with partner  - adjusted target: dc home 3/26 with caregiver support and home care referral PT OT SLP  - caregiver training    # LABS  CBC CMP 3/21    #GOC  CODE STATUS: FULL CODE      Outpatient Follow-up (Specialty/Name of physician):    Jacobo Miramontes  Physical/Rehab Medicine  [address] [place] [id number]  Phone: [phone]  Fax: [phone]  Follow Up Time:

## 2024-03-19 NOTE — DISCHARGE NOTE PROVIDER - DETAILS OF MALNUTRITION DIAGNOSIS/DIAGNOSES
This patient has been assessed with a concern for Malnutrition and was treated during this hospitalization for the following Nutrition diagnosis/diagnoses:     -  03/05/2024: Severe protein-calorie malnutrition   -  03/05/2024: Underweight (BMI < 19)

## 2024-03-20 LAB
GLUCOSE BLDC GLUCOMTR-MCNC: 143 MG/DL — HIGH (ref 70–99)
GLUCOSE BLDC GLUCOMTR-MCNC: 155 MG/DL — HIGH (ref 70–99)
GLUCOSE BLDC GLUCOMTR-MCNC: 219 MG/DL — HIGH (ref 70–99)
GLUCOSE BLDC GLUCOMTR-MCNC: 284 MG/DL — HIGH (ref 70–99)

## 2024-03-20 PROCEDURE — 99232 SBSQ HOSP IP/OBS MODERATE 35: CPT

## 2024-03-20 PROCEDURE — 99233 SBSQ HOSP IP/OBS HIGH 50: CPT

## 2024-03-20 RX ORDER — METFORMIN HYDROCHLORIDE 850 MG/1
500 TABLET ORAL
Refills: 0 | Status: DISCONTINUED | OUTPATIENT
Start: 2024-03-20 | End: 2024-03-25

## 2024-03-20 RX ADMIN — FLUCONAZOLE 200 MILLIGRAM(S): 150 TABLET ORAL at 12:24

## 2024-03-20 RX ADMIN — LISINOPRIL 20 MILLIGRAM(S): 2.5 TABLET ORAL at 06:11

## 2024-03-20 RX ADMIN — Medication 81 MILLIGRAM(S): at 12:25

## 2024-03-20 RX ADMIN — Medication 1 APPLICATION(S): at 22:09

## 2024-03-20 RX ADMIN — ATORVASTATIN CALCIUM 80 MILLIGRAM(S): 80 TABLET, FILM COATED ORAL at 22:09

## 2024-03-20 RX ADMIN — Medication 1 APPLICATION(S): at 06:12

## 2024-03-20 RX ADMIN — SENNA PLUS 2 TABLET(S): 8.6 TABLET ORAL at 22:09

## 2024-03-20 RX ADMIN — Medication 1: at 16:35

## 2024-03-20 RX ADMIN — Medication 1 SPRAY(S): at 06:11

## 2024-03-20 RX ADMIN — CLOPIDOGREL BISULFATE 75 MILLIGRAM(S): 75 TABLET, FILM COATED ORAL at 12:24

## 2024-03-20 RX ADMIN — ENOXAPARIN SODIUM 30 MILLIGRAM(S): 100 INJECTION SUBCUTANEOUS at 22:06

## 2024-03-20 RX ADMIN — AMLODIPINE BESYLATE 10 MILLIGRAM(S): 2.5 TABLET ORAL at 06:11

## 2024-03-20 RX ADMIN — METFORMIN HYDROCHLORIDE 500 MILLIGRAM(S): 850 TABLET ORAL at 16:36

## 2024-03-20 RX ADMIN — Medication 1 APPLICATION(S): at 12:29

## 2024-03-20 RX ADMIN — Medication 3: at 12:20

## 2024-03-20 RX ADMIN — DONEPEZIL HYDROCHLORIDE 5 MILLIGRAM(S): 10 TABLET, FILM COATED ORAL at 07:41

## 2024-03-20 RX ADMIN — Medication 20 MILLIEQUIVALENT(S): at 12:24

## 2024-03-20 RX ADMIN — Medication 2: at 07:41

## 2024-03-20 RX ADMIN — POLYETHYLENE GLYCOL 3350 17 GRAM(S): 17 POWDER, FOR SOLUTION ORAL at 16:35

## 2024-03-20 RX ADMIN — Medication 1 SPRAY(S): at 22:10

## 2024-03-20 RX ADMIN — GABAPENTIN 200 MILLIGRAM(S): 400 CAPSULE ORAL at 22:10

## 2024-03-20 RX ADMIN — Medication 1 SPRAY(S): at 12:29

## 2024-03-20 NOTE — PROGRESS NOTE ADULT - ASSESSMENT
62 YO female with PMH HTN, HLD, DM2, CVA (10/2023), who presented to [facility] ED on 3/1/24 with right sided facial, right arm and leg weakness, aphasia and episodes of confusion as well as a fall. MRI brain demonstrated multiple small acute infarctions in the left MCA distribution     #Left MCA CVA with expressive aphasia, right hemiparesis, and right facial droop  - MRI 3/1: Multiple foci of diffusion restriction scattered throughout the left MCA vascular distribution, compatible with acute infarctions. Chronic lacunar infarctions in the right centrum semiovale/corona radiata and left hemipons. Encephalomalacia/gliotic change in the anterior right temporal lobe. Foci of susceptibility artifact in the right basal ganglia, likely sequela of prior micro-hemorrhages.  - ASA & plavix for 3 months, Discontinue ASA after 3 months (6/1/24)  - High dose statin  - Continue Aricept 5 mg Q8AM 3/9  - Continue Comprehensive Rehab Program: PT/OT/ST, 3hours daily and 5 days weekly  - right elbow comfy splint to improve extension. Patient does not tolerate right resting hand splint, will request OT to re-eval fit  - will benefit from AFO for right ankle DF assist and knee stabilization on dc  - continue to monitor RUE spasticity. Patient's partner defers start of antispasticity medication at this time due to concern of sedation  - recreation therapy  - Precautions: fall, aspiration, cardiac, DM, AMS    # dysphonia  - ENT 3/7: eval VC, difficulty tolerating laryngoscopy through nose, (+)dried nasal crusting, tolerated laryngoscopy through mouth, vocal cords mobile bilaterally, no pooling, no lesions  - Nasal saline spray TID    # HTN  - Amlodipine 10 mg daily  - Lisinopril 20mg daily  - BP controlled    # HLD  - Lipitor 80mg daily    # Diabetes Mellitus Type 2 without complications  - A1c 5.5 on 3/2/24  -management as per hospitalist    # leukocytosis  - dysuria now resolved  - UA negative 3/10  - Repeat UA {3/12): +nit sm LE WBC 2. Patient is symptomatic, dysuria, frequency, also more agitated.   - Repeat UA (3/13) now with moderate LE and positive nitrite   - Urine cx (3/13): normal urogenital chelsi  - bladder scan with no signs of retention  - WBC 13.5 3/11--> 10.87 3/12 --> 13.47 3/14 --> 12.30 3/15 --> 12.65 (3/18)  - s/p Macrobid 100 mg BID (3/13 PM - 3/16 AM). per hospitalist, as patient symptomatic with leukocytosis but urine cx negative, treat for 3 days  - continue to monitor, CBC 3/21    # oral thrush  - not improved with nystatin suspension (3/5 - 3/15) --> d/c  - started diflucan 200 mg x1 (3/15), was on 100 mg QD (3/16-3/17) with worsening thrush, increased back to 200 mg QD on 3/18  - continue oral care    # hypokalemia (resolved)  - KCl 20 meq daily re-started 3/7, home medication   - BMP 3/21    # hypomagnesemia (resolved)  - Mg oxide supplemented   - Mg 1.5 (3/5) -->2.0 (3/18)    # Pain management  - Tylenol PRN  - gabapentin 200 mg QHS 3/4   - controlled    # Bowel Regimen/Constipation  - Senna QHS  - Miralax QD --> BID (3/18)  - reports good relief with prune juice in past, dietary aware to add to tray  - encouraged oral hydration  - bisacodyl suppository 3/19 with success    # Sleep:   - Melatonin 3mg nightly PRN    # FEN   - Diet: upgraded to soft and bite sized (3/19)    # DVT ppx  - Bilateral LE duplex (3/8) NEGATIVE DVT  - Lovenox    # Case discussed in IDT rounds 3/13 (follow up):  - mod assist transfers, ambulates 70 feet wiht HHA and max assist ACE wrap DF assist right ankle, min assist-CG bed mobility, min assist transfers, emotionally labile, improved yes/no reliability, set up/supervision eating and grooming, mod assist LB dressing, mod assist toilet and shower trnasfers  - adjusted goals: supervision due to cog-language deficits, CG transfers from wheelchair level including to toilet, CG assist bADLs, WC level mobility on dc. Will need home modifications for WC including potential first floor set up, team to start with partner  - adjusted target: dc home 3/26 with caregiver support and home care referral PT OT SLP  - caregiver training    # LABS  CBC CMP 3/21    #GOC  CODE STATUS: FULL CODE      Outpatient Follow-up (Specialty/Name of physician):    Jacobo Miramontes  Physical/Rehab Medicine  [address] [place] [id number]  Phone: [phone]  Fax: [phone]  Follow Up Time:

## 2024-03-20 NOTE — PROGRESS NOTE ADULT - SUBJECTIVE AND OBJECTIVE BOX
HPI:  This ia a 62 YO female RH dominant with PMH HTN, HLD, DM2, CVA (10/2023), who presented to  [facility] ED on 3/1/24  following a fall at home. According to the partner, Connie, the patient was in her usual state of health up until 5 days prior to admission when she was noted to have right sided facial, arm and leg weakness, aphasia and episodes of confusion. She took ASA 81mg with improvement of symptoms; pt was able to ambulate and function at home but felt weak. On the day prior to admission, the patient fell prompting her partner to bring her to the ED.    On arrival, pt noted to have dense right sided facial droop and right sided hemiparesis and aphasia.  MRI brain demonstrated multiple small acute infarctions in the left MCA distribution and chronic ischemic changes from prior strokes. CTA head/neck: negative for LVO occlusion. Patient was evaluated by neurology, and recommended for ASA & plavix x 3 months.    Patient was evaluated by PM&R and therapy for functional deficits, gait/ADL impairments and acute rehabilitation was recommended. Patient was discharged to  [facility] on 3/4/24. (04 Mar 2024 13:15)          Subjective:  Seen this AM in therapy.  Slept during the night. had +BM.  abdomen feels better.  No other complaints. tolerating therapy.       VITALS  Vital Signs Last 24 Hrs  T(C): 36.6 (20 Mar 2024 08:10), Max: 36.6 (20 Mar 2024 08:10)  T(F): 97.9 (20 Mar 2024 08:10), Max: 97.9 (20 Mar 2024 08:10)  HR: 69 (20 Mar 2024 08:10) (64 - 77)  BP: 114/72 (20 Mar 2024 08:10) (114/72 - 145/74)  BP(mean): --  RR: 16 (20 Mar 2024 08:10) (15 - 16)  SpO2: 97% (20 Mar 2024 08:10) (97% - 98%)    Parameters below as of 20 Mar 2024 08:10  Patient On (Oxygen Delivery Method): room air        REVIEW OF SYMPTOMS  Neurological deficits      PHYSICAL EXAM  Gen - NAD, Comfortable  HEENT - (+) oral thrush improved  Neck - Supple, No limited ROM  Pulm - breathing comfortably on room air  Cardiovascular - warm and well perfused  Abdomen - Soft, nontender  Extremities - No peripheral edema, no calf tenderness  Neuro-     Cognitive - alert, follows simple commands but difficulty with motor planning     Communication - (+)dysarthria, mostly says yes/no but inconsistent at times     Cranial Nerves - right sided facial weakness     Motor - abducts right shoulder and extends right knee anti-gravity, otherwise difficulty with following commands for accurate motor testing, 5/5 strength on LUE/LLE     Tone - increased tone in RUE (MAS 2-3 in biceps, MAS 1+ in triceps)  Psychiatric - Mood stable, Affect WNL    RECENT LABS                  RADIOLOGY/OTHER RESULTS      MEDICATIONS  (STANDING):  amLODIPine   Tablet 10 milliGRAM(s) Oral daily  AQUAPHOR (petrolatum Ointment) 1 Application(s) Topical three times a day  aspirin enteric coated 81 milliGRAM(s) Oral daily  atorvastatin 80 milliGRAM(s) Oral at bedtime  clopidogrel Tablet 75 milliGRAM(s) Oral daily  dextrose 5%. 1000 milliLiter(s) (50 mL/Hr) IV Continuous <Continuous>  dextrose 5%. 1000 milliLiter(s) (100 mL/Hr) IV Continuous <Continuous>  dextrose 50% Injectable 25 Gram(s) IV Push once  dextrose 50% Injectable 12.5 Gram(s) IV Push once  dextrose 50% Injectable 25 Gram(s) IV Push once  donepezil 5 milliGRAM(s) Oral <User Schedule>  enoxaparin Injectable 30 milliGRAM(s) SubCutaneous <User Schedule>  fluconAZOLE   Tablet 200 milliGRAM(s) Oral daily  gabapentin 200 milliGRAM(s) Oral at bedtime  glucagon  Injectable 1 milliGRAM(s) IntraMuscular once  insulin lispro (ADMELOG) corrective regimen sliding scale   SubCutaneous three times a day before meals  insulin lispro (ADMELOG) corrective regimen sliding scale   SubCutaneous at bedtime  lisinopril 20 milliGRAM(s) Oral daily  metFORMIN 500 milliGRAM(s) Oral two times a day with meals  polyethylene glycol 3350 17 Gram(s) Oral two times a day  potassium chloride   Powder 20 milliEquivalent(s) Oral daily  senna 2 Tablet(s) Oral at bedtime  sodium chloride 0.65% Nasal 1 Spray(s) Both Nostrils three times a day    MEDICATIONS  (PRN):  acetaminophen     Tablet .. 650 milliGRAM(s) Oral every 6 hours PRN Temp greater or equal to 38C (100.4F), Mild Pain (1 - 3)  aluminum hydroxide/magnesium hydroxide/simethicone Suspension 30 milliLiter(s) Oral every 4 hours PRN Dyspepsia  dextrose Oral Gel 15 Gram(s) Oral once PRN Blood Glucose LESS THAN 70 milliGRAM(s)/deciliter  melatonin 3 milliGRAM(s) Oral at bedtime PRN Insomnia  ondansetron   Disintegrating Tablet 4 milliGRAM(s) Oral every 8 hours PRN Nausea and/or Vomiting

## 2024-03-20 NOTE — PROGRESS NOTE ADULT - SUBJECTIVE AND OBJECTIVE BOX
Patient is a 63y old  Female who presents with a chief complaint of L MCA CVA with expressive aphasia, right hemiparesis, and right facial droop     Patient seen and examined at bedside. no nausea, vomiting, headaches, shortness of breath.     Vital Signs Last 24 Hrs  T(C): 36.6 (20 Mar 2024 08:10), Max: 36.6 (20 Mar 2024 08:10)  T(F): 97.9 (20 Mar 2024 08:10), Max: 97.9 (20 Mar 2024 08:10)  HR: 69 (20 Mar 2024 08:10) (64 - 77)  BP: 114/72 (20 Mar 2024 08:10) (114/72 - 145/74)  BP(mean): --  RR: 16 (20 Mar 2024 08:10) (15 - 16)  SpO2: 97% (20 Mar 2024 08:10) (97% - 98%)    Parameters below as of 20 Mar 2024 08:10  Patient On (Oxygen Delivery Method): room air    GENERAL- NAD  EAR/NOSE/MOUTH/THROAT -  MMM  EYES- ADDIE, conjunctiva and Sclera clear  NECK- supple  RESPIRATORY-  clear to auscultation bilaterally, non laboured breathing  CARDIOVASCULAR - SIS2, RRR  GI - soft NT BS present  EXTREMITIES- no pedal edema  NEUROLOGY- right sided weakness, facial droop, dysarthria, expressive aphasia    CAPILLARY BLOOD GLUCOSE      POCT Blood Glucose.: 284 mg/dL (20 Mar 2024 12:18)  POCT Blood Glucose.: 219 mg/dL (20 Mar 2024 07:40)  POCT Blood Glucose.: 231 mg/dL (19 Mar 2024 21:13)  POCT Blood Glucose.: 162 mg/dL (19 Mar 2024 16:37)      A1C with Estimated Average Glucose Result: 5. % (03.02.24 @ 06:25)      MEDICATIONS  (STANDING):  amLODIPine   Tablet 10 milliGRAM(s) Oral daily  AQUAPHOR (petrolatum Ointment) 1 Application(s) Topical three times a day  aspirin enteric coated 81 milliGRAM(s) Oral daily  atorvastatin 80 milliGRAM(s) Oral at bedtime  clopidogrel Tablet 75 milliGRAM(s) Oral daily  donepezil 5 milliGRAM(s) Oral <User Schedule>  enoxaparin Injectable 30 milliGRAM(s) SubCutaneous <User Schedule>  fluconazole   Tablet 200 milliGRAM(s) Oral daily  gabapentin 200 milliGRAM(s) Oral at bedtime  glucagon  Injectable 1 milliGRAM(s) IntraMuscular once  insulin lispro (ADMELOG) corrective regimen sliding scale   SubCutaneous three times a day before meals  insulin lispro (ADMELOG) corrective regimen sliding scale   SubCutaneous at bedtime  lisinopril 20 milliGRAM(s) Oral daily  polyethylene glycol 3350 17 Gram(s) Oral two times a day  potassium chloride   Powder 20 milliEquivalent(s) Oral daily  senna 2 Tablet(s) Oral at bedtime  sodium chloride 0.65% Nasal 1 Spray(s) Both Nostrils three times a day    MEDICATIONS  (PRN):  acetaminophen     Tablet .. 650 milliGRAM(s) Oral every 6 hours PRN Temp greater or equal to 38C (100.4F), Mild Pain (1 - 3)  aluminum hydroxide/magnesium hydroxide/simethicone Suspension 30 milliLiter(s) Oral every 4 hours PRN Dyspepsia  dextrose Oral Gel 15 Gram(s) Oral once PRN Blood Glucose LESS THAN 70 milliGRAM(s)/deciliter  melatonin 3 milliGRAM(s) Oral at bedtime PRN Insomnia  ondansetron   Disintegrating Tablet 4 milliGRAM(s) Oral every 8 hours PRN Nausea and/or Vomiting

## 2024-03-20 NOTE — PROGRESS NOTE ADULT - ASSESSMENT
63F with HTN, HLD, DM2, prior CVA, now in acute rehab after left ischemic MCA stroke- pt/ot/dvt ppx    #Ischemic MCA stroke  -c/w ASA and Plavix  -c/w statin  -c/w Aricept    #Essential HTN  - On amlodipine and lisinopril    #Oral candidiasis  Symptomatic   fluconazole     # uti  s/p Macrobid 100 mg BID (3/13 PM - 3/16 AM).  culture remained negative    #DM2 with hyperglycemia  A1C with Estimated Average Glucose Result: 5.5 % (03-02-24 @ 06:25)  Hold home metformin 500mg BID  can restart- BG noted elevated.    # DVT ppx  - Bilateral LE duplex (3/8) NEGATIVE DVT  - Lovenox    will follow  d/w rehab team   time spent in e/m isit 50 min    63F with HTN, HLD, DM2, prior CVA, now in acute rehab after left ischemic MCA stroke- pt/ot/dvt ppx    #Ischemic MCA stroke  -c/w ASA and Plavix  -c/w statin  -c/w Aricept    #Essential HTN  - On amlodipine and lisinopril    #Oral candidiasis  Symptomatic   fluconazole     # uti  s/p Macrobid 100 mg BID (3/13 PM - 3/16 AM).  culture remained negative    #DM2 with hyperglycemia  A1C with Estimated Average Glucose Result: 5.5 % (03-02-24 @ 06:25)   home metformin 500mg BID can restart- BG noted elevated.    # DVT ppx  - Bilateral LE duplex (3/8) NEGATIVE DVT  - Lovenox    will follow  d/w rehab team   time spent in e/m isit 50 min

## 2024-03-21 LAB
ALBUMIN SERPL ELPH-MCNC: 3.8 G/DL — SIGNIFICANT CHANGE UP (ref 3.3–5)
ALP SERPL-CCNC: 74 U/L — SIGNIFICANT CHANGE UP (ref 40–120)
ALT FLD-CCNC: 29 U/L — SIGNIFICANT CHANGE UP (ref 10–45)
ANION GAP SERPL CALC-SCNC: 9 MMOL/L — SIGNIFICANT CHANGE UP (ref 5–17)
AST SERPL-CCNC: 20 U/L — SIGNIFICANT CHANGE UP (ref 10–40)
BASOPHILS # BLD AUTO: 0.07 K/UL — SIGNIFICANT CHANGE UP (ref 0–0.2)
BASOPHILS NFR BLD AUTO: 0.6 % — SIGNIFICANT CHANGE UP (ref 0–2)
BILIRUB SERPL-MCNC: 0.6 MG/DL — SIGNIFICANT CHANGE UP (ref 0.2–1.2)
BUN SERPL-MCNC: 18 MG/DL — SIGNIFICANT CHANGE UP (ref 7–23)
CALCIUM SERPL-MCNC: 9.8 MG/DL — SIGNIFICANT CHANGE UP (ref 8.4–10.5)
CHLORIDE SERPL-SCNC: 99 MMOL/L — SIGNIFICANT CHANGE UP (ref 96–108)
CO2 SERPL-SCNC: 28 MMOL/L — SIGNIFICANT CHANGE UP (ref 22–31)
CREAT SERPL-MCNC: 0.64 MG/DL — SIGNIFICANT CHANGE UP (ref 0.5–1.3)
EGFR: 99 ML/MIN/1.73M2 — SIGNIFICANT CHANGE UP
EOSINOPHIL # BLD AUTO: 0.09 K/UL — SIGNIFICANT CHANGE UP (ref 0–0.5)
EOSINOPHIL NFR BLD AUTO: 0.7 % — SIGNIFICANT CHANGE UP (ref 0–6)
GLUCOSE BLDC GLUCOMTR-MCNC: 151 MG/DL — HIGH (ref 70–99)
GLUCOSE BLDC GLUCOMTR-MCNC: 179 MG/DL — HIGH (ref 70–99)
GLUCOSE BLDC GLUCOMTR-MCNC: 181 MG/DL — HIGH (ref 70–99)
GLUCOSE BLDC GLUCOMTR-MCNC: 308 MG/DL — HIGH (ref 70–99)
GLUCOSE SERPL-MCNC: 216 MG/DL — HIGH (ref 70–99)
HCT VFR BLD CALC: 31.9 % — LOW (ref 34.5–45)
HGB BLD-MCNC: 10.4 G/DL — LOW (ref 11.5–15.5)
IMM GRANULOCYTES NFR BLD AUTO: 0.4 % — SIGNIFICANT CHANGE UP (ref 0–0.9)
LYMPHOCYTES # BLD AUTO: 27.7 % — SIGNIFICANT CHANGE UP (ref 13–44)
LYMPHOCYTES # BLD AUTO: 3.37 K/UL — HIGH (ref 1–3.3)
MAGNESIUM SERPL-MCNC: 2 MG/DL — SIGNIFICANT CHANGE UP (ref 1.6–2.6)
MCHC RBC-ENTMCNC: 29.5 PG — SIGNIFICANT CHANGE UP (ref 27–34)
MCHC RBC-ENTMCNC: 32.6 GM/DL — SIGNIFICANT CHANGE UP (ref 32–36)
MCV RBC AUTO: 90.4 FL — SIGNIFICANT CHANGE UP (ref 80–100)
MONOCYTES # BLD AUTO: 0.63 K/UL — SIGNIFICANT CHANGE UP (ref 0–0.9)
MONOCYTES NFR BLD AUTO: 5.2 % — SIGNIFICANT CHANGE UP (ref 2–14)
NEUTROPHILS # BLD AUTO: 7.97 K/UL — HIGH (ref 1.8–7.4)
NEUTROPHILS NFR BLD AUTO: 65.4 % — SIGNIFICANT CHANGE UP (ref 43–77)
NRBC # BLD: 0 /100 WBCS — SIGNIFICANT CHANGE UP (ref 0–0)
PLATELET # BLD AUTO: 351 K/UL — SIGNIFICANT CHANGE UP (ref 150–400)
POTASSIUM SERPL-MCNC: 4 MMOL/L — SIGNIFICANT CHANGE UP (ref 3.5–5.3)
POTASSIUM SERPL-SCNC: 4 MMOL/L — SIGNIFICANT CHANGE UP (ref 3.5–5.3)
PROT SERPL-MCNC: 7.3 G/DL — SIGNIFICANT CHANGE UP (ref 6–8.3)
RBC # BLD: 3.53 M/UL — LOW (ref 3.8–5.2)
RBC # FLD: 12.1 % — SIGNIFICANT CHANGE UP (ref 10.3–14.5)
SODIUM SERPL-SCNC: 136 MMOL/L — SIGNIFICANT CHANGE UP (ref 135–145)
WBC # BLD: 12.18 K/UL — HIGH (ref 3.8–10.5)
WBC # FLD AUTO: 12.18 K/UL — HIGH (ref 3.8–10.5)

## 2024-03-21 PROCEDURE — 99232 SBSQ HOSP IP/OBS MODERATE 35: CPT

## 2024-03-21 RX ORDER — DOCUSATE SODIUM 100 MG
283 CAPSULE ORAL ONCE
Refills: 0 | Status: DISCONTINUED | OUTPATIENT
Start: 2024-03-21 | End: 2024-03-21

## 2024-03-21 RX ORDER — SENNA PLUS 8.6 MG/1
2 TABLET ORAL
Qty: 0 | Refills: 0 | DISCHARGE
Start: 2024-03-21

## 2024-03-21 RX ORDER — AMLODIPINE BESYLATE 2.5 MG/1
1 TABLET ORAL
Qty: 0 | Refills: 0 | DISCHARGE
Start: 2024-03-21

## 2024-03-21 RX ORDER — ACETAMINOPHEN 500 MG
2 TABLET ORAL
Qty: 0 | Refills: 0 | DISCHARGE
Start: 2024-03-21

## 2024-03-21 RX ORDER — POLYETHYLENE GLYCOL 3350 17 G/17G
17 POWDER, FOR SOLUTION ORAL
Qty: 0 | Refills: 0 | DISCHARGE
Start: 2024-03-21

## 2024-03-21 RX ORDER — LISINOPRIL 2.5 MG/1
1 TABLET ORAL
Refills: 0 | DISCHARGE

## 2024-03-21 RX ORDER — FLUCONAZOLE 150 MG/1
1 TABLET ORAL
Qty: 0 | Refills: 0 | DISCHARGE
Start: 2024-03-21

## 2024-03-21 RX ORDER — INSULIN LISPRO 100/ML
1 VIAL (ML) SUBCUTANEOUS
Qty: 0 | Refills: 0 | DISCHARGE
Start: 2024-03-21

## 2024-03-21 RX ORDER — ATORVASTATIN CALCIUM 80 MG/1
1 TABLET, FILM COATED ORAL
Qty: 0 | Refills: 0 | DISCHARGE
Start: 2024-03-21

## 2024-03-21 RX ORDER — SODIUM CHLORIDE 0.65 %
0 AEROSOL, SPRAY (ML) NASAL
Qty: 0 | Refills: 0 | DISCHARGE
Start: 2024-03-21

## 2024-03-21 RX ORDER — ENOXAPARIN SODIUM 100 MG/ML
40 INJECTION SUBCUTANEOUS
Qty: 0 | Refills: 0 | DISCHARGE
Start: 2024-03-21

## 2024-03-21 RX ORDER — METFORMIN HYDROCHLORIDE 850 MG/1
1 TABLET ORAL
Qty: 0 | Refills: 0 | DISCHARGE
Start: 2024-03-21

## 2024-03-21 RX ORDER — LANOLIN ALCOHOL/MO/W.PET/CERES
1 CREAM (GRAM) TOPICAL
Qty: 0 | Refills: 0 | DISCHARGE
Start: 2024-03-21

## 2024-03-21 RX ORDER — CLOPIDOGREL BISULFATE 75 MG/1
1 TABLET, FILM COATED ORAL
Qty: 0 | Refills: 0 | DISCHARGE
Start: 2024-03-21

## 2024-03-21 RX ORDER — PETROLATUM,WHITE
1 JELLY (GRAM) TOPICAL
Qty: 0 | Refills: 0 | DISCHARGE
Start: 2024-03-21

## 2024-03-21 RX ORDER — POTASSIUM CHLORIDE 20 MEQ
1 PACKET (EA) ORAL
Qty: 0 | Refills: 0 | DISCHARGE
Start: 2024-03-21

## 2024-03-21 RX ORDER — GABAPENTIN 400 MG/1
2 CAPSULE ORAL
Qty: 0 | Refills: 0 | DISCHARGE
Start: 2024-03-21

## 2024-03-21 RX ORDER — INSULIN LISPRO 100/ML
0 VIAL (ML) SUBCUTANEOUS
Qty: 0 | Refills: 0 | DISCHARGE
Start: 2024-03-21

## 2024-03-21 RX ORDER — LISINOPRIL 2.5 MG/1
1 TABLET ORAL
Qty: 0 | Refills: 0 | DISCHARGE
Start: 2024-03-21

## 2024-03-21 RX ORDER — DONEPEZIL HYDROCHLORIDE 10 MG/1
1 TABLET, FILM COATED ORAL
Qty: 0 | Refills: 0 | DISCHARGE
Start: 2024-03-21

## 2024-03-21 RX ORDER — ASPIRIN/CALCIUM CARB/MAGNESIUM 324 MG
1 TABLET ORAL
Qty: 0 | Refills: 0 | DISCHARGE
Start: 2024-03-21

## 2024-03-21 RX ORDER — ASPIRIN/CALCIUM CARB/MAGNESIUM 324 MG
1 TABLET ORAL
Refills: 0 | DISCHARGE

## 2024-03-21 RX ADMIN — Medication 81 MILLIGRAM(S): at 12:27

## 2024-03-21 RX ADMIN — POLYETHYLENE GLYCOL 3350 17 GRAM(S): 17 POWDER, FOR SOLUTION ORAL at 17:11

## 2024-03-21 RX ADMIN — Medication 20 MILLIEQUIVALENT(S): at 12:27

## 2024-03-21 RX ADMIN — GABAPENTIN 200 MILLIGRAM(S): 400 CAPSULE ORAL at 21:06

## 2024-03-21 RX ADMIN — LISINOPRIL 20 MILLIGRAM(S): 2.5 TABLET ORAL at 06:10

## 2024-03-21 RX ADMIN — SENNA PLUS 2 TABLET(S): 8.6 TABLET ORAL at 21:06

## 2024-03-21 RX ADMIN — Medication 1 APPLICATION(S): at 06:11

## 2024-03-21 RX ADMIN — Medication 1 APPLICATION(S): at 14:16

## 2024-03-21 RX ADMIN — FLUCONAZOLE 200 MILLIGRAM(S): 150 TABLET ORAL at 12:27

## 2024-03-21 RX ADMIN — METFORMIN HYDROCHLORIDE 500 MILLIGRAM(S): 850 TABLET ORAL at 07:56

## 2024-03-21 RX ADMIN — DONEPEZIL HYDROCHLORIDE 5 MILLIGRAM(S): 10 TABLET, FILM COATED ORAL at 07:56

## 2024-03-21 RX ADMIN — POLYETHYLENE GLYCOL 3350 17 GRAM(S): 17 POWDER, FOR SOLUTION ORAL at 06:10

## 2024-03-21 RX ADMIN — Medication 1: at 16:29

## 2024-03-21 RX ADMIN — AMLODIPINE BESYLATE 10 MILLIGRAM(S): 2.5 TABLET ORAL at 06:10

## 2024-03-21 RX ADMIN — Medication 1 APPLICATION(S): at 21:12

## 2024-03-21 RX ADMIN — METFORMIN HYDROCHLORIDE 500 MILLIGRAM(S): 850 TABLET ORAL at 17:11

## 2024-03-21 RX ADMIN — Medication 1 SPRAY(S): at 21:12

## 2024-03-21 RX ADMIN — Medication 4: at 07:56

## 2024-03-21 RX ADMIN — ATORVASTATIN CALCIUM 80 MILLIGRAM(S): 80 TABLET, FILM COATED ORAL at 21:06

## 2024-03-21 RX ADMIN — Medication 1 SPRAY(S): at 06:11

## 2024-03-21 RX ADMIN — Medication 1 SPRAY(S): at 14:17

## 2024-03-21 RX ADMIN — Medication 10 MILLIGRAM(S): at 18:27

## 2024-03-21 RX ADMIN — Medication 1: at 12:27

## 2024-03-21 RX ADMIN — ENOXAPARIN SODIUM 30 MILLIGRAM(S): 100 INJECTION SUBCUTANEOUS at 21:05

## 2024-03-21 RX ADMIN — CLOPIDOGREL BISULFATE 75 MILLIGRAM(S): 75 TABLET, FILM COATED ORAL at 12:27

## 2024-03-21 NOTE — PROGRESS NOTE ADULT - ASSESSMENT
64 YO female with PMH HTN, HLD, DM2, CVA (10/2023), who presented to [facility] ED on 3/1/24 with right sided facial, right arm and leg weakness, aphasia and episodes of confusion as well as a fall. MRI brain demonstrated multiple small acute infarctions in the left MCA distribution     #Left MCA CVA with expressive aphasia, right hemiparesis, and right facial droop  - MRI 3/1: Multiple foci of diffusion restriction scattered throughout the left MCA vascular distribution, compatible with acute infarctions. Chronic lacunar infarctions in the right centrum semiovale/corona radiata and left hemipons. Encephalomalacia/gliotic change in the anterior right temporal lobe. Foci of susceptibility artifact in the right basal ganglia, likely sequela of prior micro-hemorrhages.  - ASA & plavix for 3 months, Discontinue ASA after 3 months (6/1/24)  - High dose statin  - Continue Aricept 5 mg Q8AM 3/9  - Continue Comprehensive Rehab Program: PT/OT/ST, 3hours daily and 5 days weekly  - right elbow comfy splint to improve extension. Patient does not tolerate right resting hand splint, will request OT to re-eval fit  - will benefit from AFO for right ankle DF assist and knee stabilization on dc  - continue to monitor RUE spasticity. Patient's partner defers start of antispasticity medication at this time due to concern of sedation  - recreation therapy  - Precautions: fall, aspiration, cardiac, DM, AMS    # dysphonia  - ENT 3/7: eval VC, difficulty tolerating laryngoscopy through nose, (+)dried nasal crusting, tolerated laryngoscopy through mouth, vocal cords mobile bilaterally, no pooling, no lesions  - Nasal saline spray TID    # HTN  - Amlodipine 10 mg daily  - Lisinopril 20mg daily  - BP controlled    # HLD  - Lipitor 80mg daily    # Diabetes Mellitus Type 2 without complications  - A1c 5.5 on 3/2/24  - management as per hospitalist  - restarted on metformin 500 mg BID on 3/20  - ISS    # leukocytosis  - dysuria now resolved  - UA negative 3/10  - Repeat UA {3/12): +nit sm LE WBC 2. Patient is symptomatic, dysuria, frequency, also more agitated.   - Repeat UA (3/13) now with moderate LE and positive nitrite   - Urine cx (3/13): normal urogenital chelsi  - bladder scan with no signs of retention  - WBC 13.5 3/11--> 10.87 3/12 --> 13.47 3/14 --> 12.30 3/15 --> 12.65 (3/18) -> 12.18 (3/21)  - s/p Macrobid 100 mg BID (3/13 PM - 3/16 AM). per hospitalist, as patient symptomatic with leukocytosis but urine cx negative, treat for 3 days  - continue to monitor    # oral thrush  - not improved with nystatin suspension (3/5 - 3/15) --> d/c  - started diflucan 200 mg x1 (3/15), was on 100 mg QD (3/16-3/17) with worsening thrush, increased back to 200 mg QD on 3/18  - continue oral care    # hypokalemia (resolved)  - KCl 20 meq daily re-started 3/7, home medication     # hypomagnesemia (resolved)  - Mg oxide supplemented   - Mg 1.5 (3/5) -->2.0 (3/18)    # Pain management  - Tylenol PRN  - gabapentin 200 mg QHS 3/4   - controlled    # Bowel Regimen/Constipation  - Senna QHS  - Miralax QD --> BID (3/18)  - reports good relief with prune juice in past, dietary aware to add to tray  - encouraged oral hydration  - bisacodyl suppository 3/19 with success  - repeat bisacodyl suppository 3/21    # Sleep:   - Melatonin 3mg nightly PRN    # FEN   - Diet: upgraded to soft and bite sized (3/19)    # DVT ppx  - Bilateral LE duplex (3/8) NEGATIVE DVT  - Lovenox    # Case discussed in IDT rounds 3/13 (follow up):  - mod assist transfers, ambulates 70 feet wiht HHA and max assist ACE wrap DF assist right ankle, min assist-CG bed mobility, min assist transfers, emotionally labile, improved yes/no reliability, set up/supervision eating and grooming, mod assist LB dressing, mod assist toilet and shower trnasfers  - adjusted goals: supervision due to cog-language deficits, CG transfers from wheelchair level including to toilet, CG assist bADLs, WC level mobility on dc. Will need home modifications for WC including potential first floor set up, team to start with partner  - adjusted target: dc home 3/26 with caregiver support and home care referral PT OT SLP  - caregiver training    # LABS  CBC CMP 3/25    #GOC  CODE STATUS: FULL CODE      Outpatient Follow-up (Specialty/Name of physician):    Jacobo Miramontes  Physical/Rehab Medicine  [address] [place] [id number]  Phone: [phone]  Fax: [phone]  Follow Up Time:       62 YO female with PMH HTN, HLD, DM2, CVA (10/2023), who presented to [facility] ED on 3/1/24 with right sided facial, right arm and leg weakness, aphasia and episodes of confusion as well as a fall. MRI brain demonstrated multiple small acute infarctions in the left MCA distribution     #Left MCA CVA with expressive aphasia, right hemiparesis, and right facial droop  - MRI 3/1: Multiple foci of diffusion restriction scattered throughout the left MCA vascular distribution, compatible with acute infarctions. Chronic lacunar infarctions in the right centrum semiovale/corona radiata and left hemipons. Encephalomalacia/gliotic change in the anterior right temporal lobe. Foci of susceptibility artifact in the right basal ganglia, likely sequela of prior micro-hemorrhages.  - ASA & plavix for 3 months, Discontinue ASA after 3 months (6/1/24)  - High dose statin  - Continue Aricept 5 mg Q8AM 3/9  - Continue Comprehensive Rehab Program: PT/OT/ST, 3hours daily and 5 days weekly  - right elbow comfy splint to improve extension. Patient does not tolerate right resting hand splint, will request OT to re-eval fit  - will benefit from AFO for right ankle DF assist and knee stabilization on dc  - continue to monitor RUE spasticity. Patient's partner defers start of antispasticity medication at this time due to concern of sedation  - recreation therapy  - Precautions: fall, aspiration, cardiac, DM, AMS    # dysphonia  - ENT 3/7: eval VC, difficulty tolerating laryngoscopy through nose, (+)dried nasal crusting, tolerated laryngoscopy through mouth, vocal cords mobile bilaterally, no pooling, no lesions  - Nasal saline spray TID    # HTN  - Amlodipine 10 mg daily  - Lisinopril 20mg daily  - BP controlled    # HLD  - Lipitor 80mg daily    # Diabetes Mellitus Type 2 without complications  - A1c 5.5 on 3/2/24  - management as per hospitalist  - restarted on metformin 500 mg BID on 3/20  - ISS    # leukocytosis  - dysuria now resolved  - UA negative 3/10  - Repeat UA {3/12): +nit sm LE WBC 2. Patient is symptomatic, dysuria, frequency, also more agitated.   - Repeat UA (3/13) now with moderate LE and positive nitrite   - Urine cx (3/13): normal urogenital chelsi  - bladder scan with no signs of retention  - WBC 13.5 3/11--> 10.87 3/12 --> 13.47 3/14 --> 12.30 3/15 --> 12.65 (3/18) -> 12.18 (3/21)  - s/p Macrobid 100 mg BID (3/13 PM - 3/16 AM). per hospitalist, as patient symptomatic with leukocytosis but urine cx negative, treat for 3 days  - continue to monitor    # oral thrush  - not improved with nystatin suspension (3/5 - 3/15) --> d/c  - started diflucan 200 mg x1 (3/15), was on 100 mg QD (3/16-3/17) with worsening thrush, increased back to 200 mg QD on 3/18  - continue oral care    # hypokalemia (resolved)  - KCl 20 meq daily re-started 3/7, home medication     # hypomagnesemia (resolved)  - Mg oxide supplemented   - Mg 1.5 (3/5) -->2.0 (3/18)    # Pain management  - Tylenol PRN  - gabapentin 200 mg QHS 3/4   - controlled    # Bowel Regimen/Constipation  - Senna QHS  - Miralax QD --> BID (3/18)  - reports good relief with prune juice in past, dietary aware to add to tray  - encouraged oral hydration  - bisacodyl suppository 3/19 with success  - repeat bisacodyl suppository 3/21    # Sleep:   - Melatonin 3mg nightly PRN    # FEN   - Diet: upgraded to soft and bite sized (3/19)    # DVT ppx  - Bilateral LE duplex (3/8) NEGATIVE DVT  - Lovenox    # Case discussed in IDT rounds 3/20 (follow up):  - BM CG,  transfers Terrell, toilet transfer Terrell stand-pivot, tub transfer min-modA,gait: Amb 70 ft, using RW with R platform, max a x1 at R LE (PT on rolling stool) to advance and clear the foot, mod A x1 to control RW and shift her weight, receptive/expressive language deficits  - adjusted target: dc [facility] 3/26 or sooner    # LABS  CBC CMP 3/25    #GOC  CODE STATUS: FULL CODE      Outpatient Follow-up (Specialty/Name of physician):    Jacobo Miramontes  Physical/Rehab Medicine  [address] [place] [id number]  Phone: [phone]  Fax: [phone]  Follow Up Time:

## 2024-03-21 NOTE — PROGRESS NOTE ADULT - ATTENDING COMMENTS
Pt. seen this AM.  Agree with documentation above as per resident with amendments made as appropriate. Patient medically stable. Making progress towards rehab goals.     CVA  --c/o constipation  repeat bisacodyl suppository 3/21

## 2024-03-21 NOTE — PROGRESS NOTE ADULT - SUBJECTIVE AND OBJECTIVE BOX
HPI:  This ia a 64 YO female RH dominant with PMH HTN, HLD, DM2, CVA (10/2023), who presented to  [facility] ED on 3/1/24  following a fall at home. According to the partner, Connie, the patient was in her usual state of health up until 5 days prior to admission when she was noted to have right sided facial, arm and leg weakness, aphasia and episodes of confusion. She took ASA 81mg with improvement of symptoms; pt was able to ambulate and function at home but felt weak. On the day prior to admission, the patient fell prompting her partner to bring her to the ED.    On arrival, pt noted to have dense right sided facial droop and right sided hemiparesis and aphasia.  MRI brain demonstrated multiple small acute infarctions in the left MCA distribution and chronic ischemic changes from prior strokes. CTA head/neck: negative for LVO occlusion. Patient was evaluated by neurology, and recommended for ASA & plavix x 3 months.    Patient was evaluated by PM&R and therapy for functional deficits, gait/ADL impairments and acute rehabilitation was recommended. Patient was discharged to  [facility] on 3/4/24. (04 Mar 2024 13:15)      Subjective:  Seen this AM. Slept well. Denies any pain. Notes some abdominal discomfort and would like another suppository tonight. Tolerating therapy.       VITALS  Vital Signs Last 24 Hrs  T(C): 36.3 (21 Mar 2024 07:51), Max: 36.7 (20 Mar 2024 20:19)  T(F): 97.3 (21 Mar 2024 07:51), Max: 98.1 (20 Mar 2024 20:19)  HR: 91 (21 Mar 2024 07:51) (72 - 91)  BP: 104/72 (21 Mar 2024 07:51) (104/72 - 120/75)  BP(mean): --  RR: 16 (21 Mar 2024 07:51) (16 - 16)  SpO2: 100% (21 Mar 2024 07:51) (98% - 100%)    Parameters below as of 21 Mar 2024 07:51  Patient On (Oxygen Delivery Method): room air      REVIEW OF SYMPTOMS  Neurological deficits      PHYSICAL EXAM  Gen - NAD, Comfortable  HEENT - (+) oral thrush improved  Neck - Supple, No limited ROM  Pulm - breathing comfortably on room air  Cardiovascular - warm and well perfused  Abdomen - Soft, nontender  Extremities - No peripheral edema, no calf tenderness  Neuro-     Cognitive - alert, follows simple commands but difficulty with motor planning     Communication - (+)dysarthria, mostly says yes/no but inconsistent at times     Cranial Nerves - right sided facial weakness     Motor - abducts right shoulder and extends right knee anti-gravity, otherwise difficulty with following commands for accurate motor testing, 5/5 strength on LUE/LLE     Tone - increased tone in RUE (MAS 2-3 in biceps, MAS 1+ in triceps)  Psychiatric - Mood stable, Affect WNL    RECENT LABS                        10.4   12.18 )-----------( 351      ( 21 Mar 2024 06:46 )             31.9     03-21    136  |  99  |  18  ----------------------------<  216<H>  4.0   |  28  |  0.64    Ca    9.8      21 Mar 2024 06:46  Mg     2.0     03-21    TPro  7.3  /  Alb  3.8  /  TBili  0.6  /  DBili  x   /  AST  20  /  ALT  29  /  AlkPhos  74  03-21      MEDICATIONS  (STANDING):  amLODIPine   Tablet 10 milliGRAM(s) Oral daily  AQUAPHOR (petrolatum Ointment) 1 Application(s) Topical three times a day  aspirin enteric coated 81 milliGRAM(s) Oral daily  atorvastatin 80 milliGRAM(s) Oral at bedtime  bisacodyl Suppository 10 milliGRAM(s) Rectal once  clopidogrel Tablet 75 milliGRAM(s) Oral daily  dextrose 5%. 1000 milliLiter(s) (100 mL/Hr) IV Continuous <Continuous>  dextrose 5%. 1000 milliLiter(s) (50 mL/Hr) IV Continuous <Continuous>  dextrose 50% Injectable 25 Gram(s) IV Push once  dextrose 50% Injectable 12.5 Gram(s) IV Push once  dextrose 50% Injectable 25 Gram(s) IV Push once  donepezil 5 milliGRAM(s) Oral <User Schedule>  enoxaparin Injectable 30 milliGRAM(s) SubCutaneous <User Schedule>  fluconAZOLE   Tablet 200 milliGRAM(s) Oral daily  gabapentin 200 milliGRAM(s) Oral at bedtime  glucagon  Injectable 1 milliGRAM(s) IntraMuscular once  insulin lispro (ADMELOG) corrective regimen sliding scale   SubCutaneous three times a day before meals  insulin lispro (ADMELOG) corrective regimen sliding scale   SubCutaneous at bedtime  lisinopril 20 milliGRAM(s) Oral daily  metFORMIN 500 milliGRAM(s) Oral two times a day with meals  polyethylene glycol 3350 17 Gram(s) Oral two times a day  potassium chloride   Powder 20 milliEquivalent(s) Oral daily  senna 2 Tablet(s) Oral at bedtime  sodium chloride 0.65% Nasal 1 Spray(s) Both Nostrils three times a day    MEDICATIONS  (PRN):  acetaminophen     Tablet .. 650 milliGRAM(s) Oral every 6 hours PRN Temp greater or equal to 38C (100.4F), Mild Pain (1 - 3)  aluminum hydroxide/magnesium hydroxide/simethicone Suspension 30 milliLiter(s) Oral every 4 hours PRN Dyspepsia  dextrose Oral Gel 15 Gram(s) Oral once PRN Blood Glucose LESS THAN 70 milliGRAM(s)/deciliter  melatonin 3 milliGRAM(s) Oral at bedtime PRN Insomnia  ondansetron   Disintegrating Tablet 4 milliGRAM(s) Oral every 8 hours PRN Nausea and/or Vomiting

## 2024-03-22 LAB
GLUCOSE BLDC GLUCOMTR-MCNC: 137 MG/DL — HIGH (ref 70–99)
GLUCOSE BLDC GLUCOMTR-MCNC: 151 MG/DL — HIGH (ref 70–99)
GLUCOSE BLDC GLUCOMTR-MCNC: 189 MG/DL — HIGH (ref 70–99)
GLUCOSE BLDC GLUCOMTR-MCNC: 196 MG/DL — HIGH (ref 70–99)

## 2024-03-22 PROCEDURE — 99232 SBSQ HOSP IP/OBS MODERATE 35: CPT

## 2024-03-22 RX ADMIN — Medication 1 SPRAY(S): at 05:46

## 2024-03-22 RX ADMIN — Medication 1 SPRAY(S): at 21:31

## 2024-03-22 RX ADMIN — GABAPENTIN 200 MILLIGRAM(S): 400 CAPSULE ORAL at 21:31

## 2024-03-22 RX ADMIN — ENOXAPARIN SODIUM 30 MILLIGRAM(S): 100 INJECTION SUBCUTANEOUS at 20:36

## 2024-03-22 RX ADMIN — FLUCONAZOLE 200 MILLIGRAM(S): 150 TABLET ORAL at 12:15

## 2024-03-22 RX ADMIN — METFORMIN HYDROCHLORIDE 500 MILLIGRAM(S): 850 TABLET ORAL at 16:47

## 2024-03-22 RX ADMIN — Medication 1 SPRAY(S): at 14:53

## 2024-03-22 RX ADMIN — DONEPEZIL HYDROCHLORIDE 5 MILLIGRAM(S): 10 TABLET, FILM COATED ORAL at 08:32

## 2024-03-22 RX ADMIN — Medication 1 APPLICATION(S): at 21:35

## 2024-03-22 RX ADMIN — Medication 1: at 08:31

## 2024-03-22 RX ADMIN — SENNA PLUS 2 TABLET(S): 8.6 TABLET ORAL at 21:31

## 2024-03-22 RX ADMIN — METFORMIN HYDROCHLORIDE 500 MILLIGRAM(S): 850 TABLET ORAL at 08:32

## 2024-03-22 RX ADMIN — Medication 20 MILLIEQUIVALENT(S): at 12:14

## 2024-03-22 RX ADMIN — Medication 81 MILLIGRAM(S): at 12:14

## 2024-03-22 RX ADMIN — Medication 1 APPLICATION(S): at 05:47

## 2024-03-22 RX ADMIN — Medication 1 APPLICATION(S): at 14:53

## 2024-03-22 RX ADMIN — AMLODIPINE BESYLATE 10 MILLIGRAM(S): 2.5 TABLET ORAL at 05:43

## 2024-03-22 RX ADMIN — CLOPIDOGREL BISULFATE 75 MILLIGRAM(S): 75 TABLET, FILM COATED ORAL at 12:14

## 2024-03-22 RX ADMIN — LISINOPRIL 20 MILLIGRAM(S): 2.5 TABLET ORAL at 05:43

## 2024-03-22 RX ADMIN — Medication 1: at 12:15

## 2024-03-22 RX ADMIN — Medication 1: at 16:48

## 2024-03-22 RX ADMIN — POLYETHYLENE GLYCOL 3350 17 GRAM(S): 17 POWDER, FOR SOLUTION ORAL at 05:43

## 2024-03-22 RX ADMIN — ATORVASTATIN CALCIUM 80 MILLIGRAM(S): 80 TABLET, FILM COATED ORAL at 21:31

## 2024-03-22 NOTE — PROGRESS NOTE ADULT - SUBJECTIVE AND OBJECTIVE BOX
Patient is a 63y old  Female who presents with a chief complaint of L MCA CVA with expressive aphasia, right hemiparesis, and right facial droop     Patient seen and examined at bedside. no nausea, vomiting, headaches, shortness of breath.     Vital Signs Last 24 Hrs  T(C): 36.7 (22 Mar 2024 09:07), Max: 36.7 (22 Mar 2024 09:07)  T(F): 98.1 (22 Mar 2024 09:07), Max: 98.1 (22 Mar 2024 09:07)  HR: 76 (22 Mar 2024 09:07) (76 - 96)  BP: 111/71 (22 Mar 2024 09:07) (111/71 - 145/77)  BP(mean): --  RR: 15 (22 Mar 2024 09:07) (14 - 15)  SpO2: 99% (22 Mar 2024 09:07) (99% - 100%)    Parameters below as of 22 Mar 2024 09:07  Patient On (Oxygen Delivery Method): room air        GENERAL- NAD  EAR/NOSE/MOUTH/THROAT -  MMM  EYES- ADDIE, conjunctiva and Sclera clear  NECK- supple  RESPIRATORY-  clear to auscultation bilaterally, non laboured breathing  CARDIOVASCULAR - SIS2, RRR  GI - soft NT BS present  EXTREMITIES- no pedal edema  NEUROLOGY- right sided weakness, facial droop, dysarthria, expressive aphasia                  10.4                 136  | 28   | 18           12.18 >-----------< 351     ------------------------< 216                   31.9                 4.0  | 99   | 0.64                                         Ca 9.8   Mg 2.0   Ph x          MEDICATIONS  (STANDING):  amLODIPine   Tablet 10 milliGRAM(s) Oral daily  AQUAPHOR (petrolatum Ointment) 1 Application(s) Topical three times a day  aspirin enteric coated 81 milliGRAM(s) Oral daily  atorvastatin 80 milliGRAM(s) Oral at bedtime  clopidogrel Tablet 75 milliGRAM(s) Oral daily  donepezil 5 milliGRAM(s) Oral <User Schedule>  enoxaparin Injectable 30 milliGRAM(s) SubCutaneous <User Schedule>  fluconAZOLE   Tablet 200 milliGRAM(s) Oral daily  gabapentin 200 milliGRAM(s) Oral at bedtime  glucagon  Injectable 1 milliGRAM(s) IntraMuscular once  insulin lispro (ADMELOG) corrective regimen sliding scale   SubCutaneous three times a day before meals  insulin lispro (ADMELOG) corrective regimen sliding scale   SubCutaneous at bedtime  lisinopril 20 milliGRAM(s) Oral daily  metFORMIN 500 milliGRAM(s) Oral two times a day with meals  polyethylene glycol 3350 17 Gram(s) Oral two times a day  potassium chloride   Powder 20 milliEquivalent(s) Oral daily  senna 2 Tablet(s) Oral at bedtime  sodium chloride 0.65% Nasal 1 Spray(s) Both Nostrils three times a day    MEDICATIONS  (PRN):  acetaminophen     Tablet .. 650 milliGRAM(s) Oral every 6 hours PRN Temp greater or equal to 38C (100.4F), Mild Pain (1 - 3)  bisacodyl 5 milliGRAM(s) Oral every 12 hours PRN Constipation  dextrose Oral Gel 15 Gram(s) Oral once PRN Blood Glucose LESS THAN 70 milliGRAM(s)/deciliter  melatonin 3 milliGRAM(s) Oral at bedtime PRN Insomnia      A1C with Estimated Average Glucose Result: 5. % (03.02.24 @ 06:25)

## 2024-03-22 NOTE — PROGRESS NOTE ADULT - ASSESSMENT
63F with HTN, HLD, DM2, prior CVA, now in acute rehab after left ischemic MCA stroke- pt/ot/dvt ppx    #Ischemic MCA stroke  -c/w ASA and Plavix  -c/w statin  -c/w Aricept    #Essential HTN  - On amlodipine and lisinopril    #Oral candidiasis   fluconazole     # uti  s/p Macrobid 100 mg BID (3/13 PM - 3/16 AM).  culture remained negative    #DM2 with hyperglycemia  A1C with Estimated Average Glucose Result: 5.5 % (03-02-24 @ 06:25)  home metformin 500mg BID can restarted - BG noted     # DVT ppx  - Bilateral LE duplex (3/8) NEGATIVE DVT  - Lovenox    will follow  d/w rehab team   likely d/c to [facility] next week

## 2024-03-22 NOTE — PROGRESS NOTE ADULT - ATTENDING COMMENTS
Pt. seen this AM.  Agree with documentation above as per resident. Patient medically stable. Making progress towards rehab goals.     left MCA infarct  +BM yesterday after suppository.  Pt. feeling better.  No other complaints. Pt's significant other at bedside.   VSS.

## 2024-03-22 NOTE — PROGRESS NOTE ADULT - ASSESSMENT
62 YO female with PMH HTN, HLD, DM2, CVA (10/2023), who presented to [facility] ED on 3/1/24 with right sided facial, right arm and leg weakness, aphasia and episodes of confusion as well as a fall. MRI brain demonstrated multiple small acute infarctions in the left MCA distribution     #Left MCA CVA with expressive aphasia, right hemiparesis, and right facial droop  - MRI 3/1: Multiple foci of diffusion restriction scattered throughout the left MCA vascular distribution, compatible with acute infarctions. Chronic lacunar infarctions in the right centrum semiovale/corona radiata and left hemipons. Encephalomalacia/gliotic change in the anterior right temporal lobe. Foci of susceptibility artifact in the right basal ganglia, likely sequela of prior micro-hemorrhages.  - ASA & plavix for 3 months, Discontinue ASA after 3 months (6/1/24)  - High dose statin  - Continue Aricept 5 mg Q8AM 3/9  - Continue Comprehensive Rehab Program: PT/OT/ST, 3hours daily and 5 days weekly  - right elbow comfy splint to improve extension. Patient does not tolerate right resting hand splint, will request OT to re-eval fit  - will benefit from AFO for right ankle DF assist and knee stabilization on dc  - continue to monitor RUE spasticity. Patient's partner defers start of antispasticity medication at this time due to concern of sedation  - recreation therapy  - Precautions: fall, aspiration, cardiac, DM, AMS    # dysphonia  - ENT 3/7: eval VC, difficulty tolerating laryngoscopy through nose, (+)dried nasal crusting, tolerated laryngoscopy through mouth, vocal cords mobile bilaterally, no pooling, no lesions  - Nasal saline spray TID    # HTN  - Amlodipine 10 mg daily  - Lisinopril 20mg daily  - BP controlled    # HLD  - Lipitor 80mg daily    # Diabetes Mellitus Type 2 without complications  - A1c 5.5 on 3/2/24  - management as per hospitalist  - restarted on metformin 500 mg BID on 3/20  - ISS    # leukocytosis  - dysuria now resolved  - UA negative 3/10  - Repeat UA {3/12): +nit sm LE WBC 2. Patient is symptomatic, dysuria, frequency, also more agitated.   - Repeat UA (3/13) now with moderate LE and positive nitrite   - Urine cx (3/13): normal urogenital chelsi  - bladder scan with no signs of retention  - WBC 13.5 3/11--> 10.87 3/12 --> 13.47 3/14 --> 12.30 3/15 --> 12.65 (3/18) -> 12.18 (3/21)  - s/p Macrobid 100 mg BID (3/13 PM - 3/16 AM). per hospitalist, as patient symptomatic with leukocytosis but urine cx negative, treat for 3 days  - continue to monitor    # oral thrush  - not improved with nystatin suspension (3/5 - 3/15) --> d/c  - started diflucan 200 mg x1 (3/15), was on 100 mg QD (3/16-3/17) with worsening thrush, increased back to 200 mg QD on 3/18  - continue oral care    # hypokalemia (resolved)  - KCl 20 meq daily re-started 3/7, home medication     # hypomagnesemia (resolved)  - Mg oxide supplemented   - Mg 1.5 (3/5) -->2.0 (3/18)    # Pain management  - Tylenol PRN  - gabapentin 200 mg QHS 3/4   - controlled    # Bowel Regimen/Constipation  - Senna QHS  - Miralax QD --> BID (3/18)  - added dulcolax 5 mg Q12H PRN 3/22  - reports good relief with prune juice in past, dietary aware to add to tray  - encouraged oral hydration  - bisacodyl suppository 3/19 with success  - repeat bisacodyl suppository 3/21    # Sleep:   - Melatonin 3mg nightly PRN    # FEN   - Diet: upgraded to soft and bite sized (3/19)    # DVT ppx  - Bilateral LE duplex (3/8) NEGATIVE DVT  - Lovenox    # Case discussed in IDT rounds 3/20 (follow up):  - BM CG,  transfers Terrell, toilet transfer Terrell stand-pivot, tub transfer min-modA,gait: Amb 70 ft, using RW with R platform, max a x1 at R LE (PT on rolling stool) to advance and clear the foot, mod A x1 to control RW and shift her weight, receptive/expressive language deficits  - adjusted target: dc [facility] 3/26 or sooner    # LABS  CBC CMP 3/25    #GOC  CODE STATUS: FULL CODE      Outpatient Follow-up (Specialty/Name of physician):    Jacobo Miramontes  Physical/Rehab Medicine  [address] [place] [id number]  Phone: [phone]  Fax: [phone]  Follow Up Time:

## 2024-03-22 NOTE — PROGRESS NOTE ADULT - SUBJECTIVE AND OBJECTIVE BOX
HPI:  This ia a 64 YO female RH dominant with PMH HTN, HLD, DM2, CVA (10/2023), who presented to  [facility] ED on 3/1/24  following a fall at home. According to the partner, Connie, the patient was in her usual state of health up until 5 days prior to admission when she was noted to have right sided facial, arm and leg weakness, aphasia and episodes of confusion. She took ASA 81mg with improvement of symptoms; pt was able to ambulate and function at home but felt weak. On the day prior to admission, the patient fell prompting her partner to bring her to the ED.    On arrival, pt noted to have dense right sided facial droop and right sided hemiparesis and aphasia.  MRI brain demonstrated multiple small acute infarctions in the left MCA distribution and chronic ischemic changes from prior strokes. CTA head/neck: negative for LVO occlusion. Patient was evaluated by neurology, and recommended for ASA & plavix x 3 months.    Patient was evaluated by PM&R and therapy for functional deficits, gait/ADL impairments and acute rehabilitation was recommended. Patient was discharged to  [facility] on 3/4/24. (04 Mar 2024 13:15)      Subjective:  Seen this AM. Slept well. Denies any pain, headache, dizziness. Had BM yesterday. Tolerating therapy.       VITALS  Vital Signs Last 24 Hrs  T(C): 36.7 (22 Mar 2024 09:07), Max: 36.7 (22 Mar 2024 09:07)  T(F): 98.1 (22 Mar 2024 09:07), Max: 98.1 (22 Mar 2024 09:07)  HR: 76 (22 Mar 2024 09:07) (76 - 96)  BP: 111/71 (22 Mar 2024 09:07) (111/71 - 145/77)  BP(mean): --  RR: 15 (22 Mar 2024 09:07) (14 - 15)  SpO2: 99% (22 Mar 2024 09:07) (99% - 100%)    Parameters below as of 22 Mar 2024 09:07  Patient On (Oxygen Delivery Method): room air      REVIEW OF SYMPTOMS  Neurological deficits      PHYSICAL EXAM  Gen - NAD, Comfortable  HEENT - (+) oral thrush improved  Neck - Supple, No limited ROM  Pulm - CTA b/l  Cardiovascular - RRR  Abdomen - Soft, nontender, nondistended, +BS  Extremities - No peripheral edema, no calf tenderness  Neuro-     Cognitive - alert, follows simple commands but difficulty with motor planning     Communication - (+)dysarthria, mostly says yes/no but inconsistent at times     Cranial Nerves - right sided facial weakness     Motor - abducts right shoulder, flexes right hip, extends right knee anti-gravity, otherwise difficulty with following commands for accurate motor testing, 5/5 strength on LUE/LLE     Tone - increased tone in RUE (MAS 2-3 in biceps, MAS 1+ in triceps)  Psychiatric - Mood stable, Affect WNL    RECENT LABS                        10.4   12.18 )-----------( 351      ( 21 Mar 2024 06:46 )             31.9     03-21    136  |  99  |  18  ----------------------------<  216<H>  4.0   |  28  |  0.64    Ca    9.8      21 Mar 2024 06:46  Mg     2.0     03-21    TPro  7.3  /  Alb  3.8  /  TBili  0.6  /  DBili  x   /  AST  20  /  ALT  29  /  AlkPhos  74  03-21      MEDICATIONS  (STANDING):  amLODIPine   Tablet 10 milliGRAM(s) Oral daily  AQUAPHOR (petrolatum Ointment) 1 Application(s) Topical three times a day  aspirin enteric coated 81 milliGRAM(s) Oral daily  atorvastatin 80 milliGRAM(s) Oral at bedtime  clopidogrel Tablet 75 milliGRAM(s) Oral daily  dextrose 5%. 1000 milliLiter(s) (100 mL/Hr) IV Continuous <Continuous>  dextrose 5%. 1000 milliLiter(s) (50 mL/Hr) IV Continuous <Continuous>  dextrose 50% Injectable 25 Gram(s) IV Push once  dextrose 50% Injectable 12.5 Gram(s) IV Push once  dextrose 50% Injectable 25 Gram(s) IV Push once  donepezil 5 milliGRAM(s) Oral <User Schedule>  enoxaparin Injectable 30 milliGRAM(s) SubCutaneous <User Schedule>  fluconAZOLE   Tablet 200 milliGRAM(s) Oral daily  gabapentin 200 milliGRAM(s) Oral at bedtime  glucagon  Injectable 1 milliGRAM(s) IntraMuscular once  insulin lispro (ADMELOG) corrective regimen sliding scale   SubCutaneous three times a day before meals  insulin lispro (ADMELOG) corrective regimen sliding scale   SubCutaneous at bedtime  lisinopril 20 milliGRAM(s) Oral daily  metFORMIN 500 milliGRAM(s) Oral two times a day with meals  polyethylene glycol 3350 17 Gram(s) Oral two times a day  potassium chloride   Powder 20 milliEquivalent(s) Oral daily  senna 2 Tablet(s) Oral at bedtime  sodium chloride 0.65% Nasal 1 Spray(s) Both Nostrils three times a day    MEDICATIONS  (PRN):  acetaminophen     Tablet .. 650 milliGRAM(s) Oral every 6 hours PRN Temp greater or equal to 38C (100.4F), Mild Pain (1 - 3)  bisacodyl 5 milliGRAM(s) Oral every 12 hours PRN Constipation  dextrose Oral Gel 15 Gram(s) Oral once PRN Blood Glucose LESS THAN 70 milliGRAM(s)/deciliter  melatonin 3 milliGRAM(s) Oral at bedtime PRN Insomnia

## 2024-03-23 DIAGNOSIS — R47.01 APHASIA: ICD-10-CM

## 2024-03-23 DIAGNOSIS — I63.9 CEREBRAL INFARCTION, UNSPECIFIED: ICD-10-CM

## 2024-03-23 DIAGNOSIS — I10 ESSENTIAL (PRIMARY) HYPERTENSION: ICD-10-CM

## 2024-03-23 LAB
GLUCOSE BLDC GLUCOMTR-MCNC: 109 MG/DL — HIGH (ref 70–99)
GLUCOSE BLDC GLUCOMTR-MCNC: 110 MG/DL — HIGH (ref 70–99)
GLUCOSE BLDC GLUCOMTR-MCNC: 153 MG/DL — HIGH (ref 70–99)
GLUCOSE BLDC GLUCOMTR-MCNC: 277 MG/DL — HIGH (ref 70–99)

## 2024-03-23 PROCEDURE — 99232 SBSQ HOSP IP/OBS MODERATE 35: CPT

## 2024-03-23 RX ADMIN — DONEPEZIL HYDROCHLORIDE 5 MILLIGRAM(S): 10 TABLET, FILM COATED ORAL at 08:11

## 2024-03-23 RX ADMIN — ENOXAPARIN SODIUM 30 MILLIGRAM(S): 100 INJECTION SUBCUTANEOUS at 20:23

## 2024-03-23 RX ADMIN — ATORVASTATIN CALCIUM 80 MILLIGRAM(S): 80 TABLET, FILM COATED ORAL at 21:41

## 2024-03-23 RX ADMIN — METFORMIN HYDROCHLORIDE 500 MILLIGRAM(S): 850 TABLET ORAL at 17:14

## 2024-03-23 RX ADMIN — Medication 1 APPLICATION(S): at 13:44

## 2024-03-23 RX ADMIN — POLYETHYLENE GLYCOL 3350 17 GRAM(S): 17 POWDER, FOR SOLUTION ORAL at 11:09

## 2024-03-23 RX ADMIN — CLOPIDOGREL BISULFATE 75 MILLIGRAM(S): 75 TABLET, FILM COATED ORAL at 11:14

## 2024-03-23 RX ADMIN — Medication 1: at 08:10

## 2024-03-23 RX ADMIN — Medication 1 SPRAY(S): at 13:44

## 2024-03-23 RX ADMIN — Medication 1 APPLICATION(S): at 06:32

## 2024-03-23 RX ADMIN — POLYETHYLENE GLYCOL 3350 17 GRAM(S): 17 POWDER, FOR SOLUTION ORAL at 06:28

## 2024-03-23 RX ADMIN — LISINOPRIL 20 MILLIGRAM(S): 2.5 TABLET ORAL at 06:26

## 2024-03-23 RX ADMIN — Medication 1 SPRAY(S): at 06:27

## 2024-03-23 RX ADMIN — AMLODIPINE BESYLATE 10 MILLIGRAM(S): 2.5 TABLET ORAL at 06:26

## 2024-03-23 RX ADMIN — Medication 81 MILLIGRAM(S): at 11:15

## 2024-03-23 RX ADMIN — Medication 20 MILLIEQUIVALENT(S): at 11:15

## 2024-03-23 RX ADMIN — Medication 5 MILLIGRAM(S): at 11:15

## 2024-03-23 RX ADMIN — Medication 1 SPRAY(S): at 21:42

## 2024-03-23 RX ADMIN — POLYETHYLENE GLYCOL 3350 17 GRAM(S): 17 POWDER, FOR SOLUTION ORAL at 17:14

## 2024-03-23 RX ADMIN — GABAPENTIN 200 MILLIGRAM(S): 400 CAPSULE ORAL at 21:42

## 2024-03-23 RX ADMIN — Medication 3: at 11:20

## 2024-03-23 RX ADMIN — METFORMIN HYDROCHLORIDE 500 MILLIGRAM(S): 850 TABLET ORAL at 08:11

## 2024-03-23 RX ADMIN — SENNA PLUS 2 TABLET(S): 8.6 TABLET ORAL at 21:41

## 2024-03-23 NOTE — PROGRESS NOTE ADULT - SUBJECTIVE AND OBJECTIVE BOX
Patient is a 63y old  Female who presents with a chief complaint of L MCA CVA with expressive aphasia, right hemiparesis, and right facial droop (22 Mar 2024 11:48)      History, interim events and clinically pertinent issues reviewed; patient interviewed and examined.   Her Right sided weakness and expressive difficulties persist, and this AM she is complaining of right foot pain    ALLERGIES:  Allergy Status Unknown    MEDICATIONS  (STANDING):  amLODIPine   Tablet 10 milliGRAM(s) Oral daily  AQUAPHOR (petrolatum Ointment) 1 Application(s) Topical three times a day  aspirin enteric coated 81 milliGRAM(s) Oral daily  atorvastatin 80 milliGRAM(s) Oral at bedtime  clopidogrel Tablet 75 milliGRAM(s) Oral daily  dextrose 5%. 1000 milliLiter(s) (100 mL/Hr) IV Continuous <Continuous>  dextrose 5%. 1000 milliLiter(s) (50 mL/Hr) IV Continuous <Continuous>  dextrose 50% Injectable 25 Gram(s) IV Push once  dextrose 50% Injectable 12.5 Gram(s) IV Push once  dextrose 50% Injectable 25 Gram(s) IV Push once  donepezil 5 milliGRAM(s) Oral <User Schedule>  enoxaparin Injectable 30 milliGRAM(s) SubCutaneous <User Schedule>  gabapentin 200 milliGRAM(s) Oral at bedtime  glucagon  Injectable 1 milliGRAM(s) IntraMuscular once  insulin lispro (ADMELOG) corrective regimen sliding scale   SubCutaneous three times a day before meals  insulin lispro (ADMELOG) corrective regimen sliding scale   SubCutaneous at bedtime  lisinopril 20 milliGRAM(s) Oral daily  metFORMIN 500 milliGRAM(s) Oral two times a day with meals  polyethylene glycol 3350 17 Gram(s) Oral two times a day  potassium chloride   Powder 20 milliEquivalent(s) Oral daily  senna 2 Tablet(s) Oral at bedtime  sodium chloride 0.65% Nasal 1 Spray(s) Both Nostrils three times a day    MEDICATIONS  (PRN):  acetaminophen     Tablet .. 650 milliGRAM(s) Oral every 6 hours PRN Temp greater or equal to 38C (100.4F), Mild Pain (1 - 3)  bisacodyl 5 milliGRAM(s) Oral every 12 hours PRN Constipation  dextrose Oral Gel 15 Gram(s) Oral once PRN Blood Glucose LESS THAN 70 milliGRAM(s)/deciliter  melatonin 3 milliGRAM(s) Oral at bedtime PRN Insomnia    Vital Signs Last 24 Hrs  T(F): 98.1 (23 Mar 2024 08:08), Max: 98.1 (23 Mar 2024 08:08)  HR: 72 (23 Mar 2024 08:08) (72 - 76)  BP: 113/70 (23 Mar 2024 08:08) (113/70 - 123/73)  RR: 15 (23 Mar 2024 08:08) (15 - 16)  SpO2: 100% (23 Mar 2024 08:08) (99% - 100%)  I&O's Summary            POCT Blood Glucose.: 153 mg/dL (23 Mar 2024 08:07)  POCT Blood Glucose.: 137 mg/dL (22 Mar 2024 21:29)  POCT Blood Glucose.: 196 mg/dL (22 Mar 2024 16:44)  POCT Blood Glucose.: 151 mg/dL (22 Mar 2024 12:13)    PHYSICAL EXAM:  General: NAD, Alert, aphasic  ENT: MMM  Neck: Supple, No JVD  Lungs: Clear to auscultation bilaterally  Cardio: RRR, S1/S2, No murmurs  Abdomen: Soft, Nontender, Nondistended; Bowel sounds present  Extremities: No calf tenderness, No pitting edema  Neuro Rt weakness, aphasia    LABS:                        10.4   12.18 )-----------( 351      ( 21 Mar 2024 06:46 )             31.9       03-21    136  |  99  |  18  ----------------------------<  216  4.0   |  28  |  0.64    Ca    9.8      21 Mar 2024 06:46  Mg     2.0     03-21    TPro  7.3  /  Alb  3.8  /  TBili  0.6  /  DBili  x   /  AST  20  /  ALT  29  /  AlkPhos  74  03-21                03-02 Chol 165 mg/dL LDL -- HDL 54 mg/dL Trig 92 mg/dL            Urinalysis Basic - ( 21 Mar 2024 06:46 )    Color: x / Appearance: x / SG: x / pH: x  Gluc: 216 mg/dL / Ketone: x  / Bili: x / Urobili: x   Blood: x / Protein: x / Nitrite: x   Leuk Esterase: x / RBC: x / WBC x   Sq Epi: x / Non Sq Epi: x / Bacteria: x

## 2024-03-23 NOTE — PROGRESS NOTE ADULT - ASSESSMENT
63F with HTN, HLD, DM2, prior CVA, now in acute rehab after left ischemic MCA stroke-     #Ischemic MCA stroke  -c/w ASA and Plavix  -c/w statin  -c/w Aricept    #Essential HTN  - On amlodipine and lisinopril    #Oral candidiasis   fluconazole     # uti  s/p Macrobid 100 mg BID (3/13 PM - 3/16 AM).  culture remained negative    #DM2 with hyperglycemia  A1C with Estimated Average Glucose Result: 5.5 % (03-02-24 @ 06:25)  home metformin 500mg BID can restarted - BG noted     # DVT ppx  - Bilateral LE duplex (3/8) NEGATIVE DVT  - Lovenox    will follow  d/w rehab team   likely d/c to [facility] next week

## 2024-03-23 NOTE — PROGRESS NOTE ADULT - SUBJECTIVE AND OBJECTIVE BOX
Cc: Gait dysfunction    HPI: Patient with no new medical issues today. Seen and examined eating breakfast with partner at bedside. No acute overnight events, slept well. Denies pain or discomfort.   Pain controlled, no chest pain, no N/V, no Fevers/Chills. No other new ROS  Has been tolerating rehabilitation program.    acetaminophen     Tablet .. 650 milliGRAM(s) Oral every 6 hours PRN  amLODIPine   Tablet 10 milliGRAM(s) Oral daily  AQUAPHOR (petrolatum Ointment) 1 Application(s) Topical three times a day  aspirin enteric coated 81 milliGRAM(s) Oral daily  atorvastatin 80 milliGRAM(s) Oral at bedtime  bisacodyl 5 milliGRAM(s) Oral every 12 hours PRN  clopidogrel Tablet 75 milliGRAM(s) Oral daily  dextrose 5%. 1000 milliLiter(s) IV Continuous <Continuous>  dextrose 5%. 1000 milliLiter(s) IV Continuous <Continuous>  dextrose 50% Injectable 25 Gram(s) IV Push once  dextrose 50% Injectable 12.5 Gram(s) IV Push once  dextrose 50% Injectable 25 Gram(s) IV Push once  dextrose Oral Gel 15 Gram(s) Oral once PRN  donepezil 5 milliGRAM(s) Oral <User Schedule>  enoxaparin Injectable 30 milliGRAM(s) SubCutaneous <User Schedule>  gabapentin 200 milliGRAM(s) Oral at bedtime  glucagon  Injectable 1 milliGRAM(s) IntraMuscular once  insulin lispro (ADMELOG) corrective regimen sliding scale   SubCutaneous three times a day before meals  insulin lispro (ADMELOG) corrective regimen sliding scale   SubCutaneous at bedtime  lisinopril 20 milliGRAM(s) Oral daily  melatonin 3 milliGRAM(s) Oral at bedtime PRN  metFORMIN 500 milliGRAM(s) Oral two times a day with meals  polyethylene glycol 3350 17 Gram(s) Oral two times a day  potassium chloride   Powder 20 milliEquivalent(s) Oral daily  senna 2 Tablet(s) Oral at bedtime  sodium chloride 0.65% Nasal 1 Spray(s) Both Nostrils three times a day      T(C): 36.7 (03-23-24 @ 08:08), Max: 36.7 (03-23-24 @ 08:08)  HR: 72 (03-23-24 @ 08:08) (72 - 76)  BP: 113/70 (03-23-24 @ 08:08) (113/70 - 123/73)  RR: 15 (03-23-24 @ 08:08) (15 - 16)  SpO2: 100% (03-23-24 @ 08:08) (99% - 100%)    In NAD  HEENT- +thrush  Heart- RRR, S1S2  Lungs- CTA bl.  Abd- + BS, NT  Ext- No calf pain  Neuro- Exam unchanged, answers simple yes/no questions, follows simple commands, increased tone in RUE (MAS 2-3 in biceps, MAS 1+ in triceps)          Imp: Patient with diagnosis of     Left MCA CVA      admitted for comprehensive acute rehabilitation.    Plan:  - CVA - cont ASA/Plavix  - spasticity - stable, partner declined antispasticity meds. Cont gabapentin, tylenol   - HTN - BP controlled on current antihypertensive meds, /70 - 123/73 (3/23)   - Thrush - completed diflucan, continue oral care   - Continue PT/OT/SLP  - DVT prophylaxis - lovenox 30mg qd   - Skin- Turn q2h, check skin daily  - Continue current medications; patient medically stable.   - Patient is stable to continue current rehabilitation program.

## 2024-03-24 LAB
GLUCOSE BLDC GLUCOMTR-MCNC: 106 MG/DL — HIGH (ref 70–99)
GLUCOSE BLDC GLUCOMTR-MCNC: 106 MG/DL — HIGH (ref 70–99)
GLUCOSE BLDC GLUCOMTR-MCNC: 126 MG/DL — HIGH (ref 70–99)
GLUCOSE BLDC GLUCOMTR-MCNC: 152 MG/DL — HIGH (ref 70–99)

## 2024-03-24 PROCEDURE — 99232 SBSQ HOSP IP/OBS MODERATE 35: CPT

## 2024-03-24 RX ADMIN — METFORMIN HYDROCHLORIDE 500 MILLIGRAM(S): 850 TABLET ORAL at 09:29

## 2024-03-24 RX ADMIN — Medication 20 MILLIEQUIVALENT(S): at 12:34

## 2024-03-24 RX ADMIN — POLYETHYLENE GLYCOL 3350 17 GRAM(S): 17 POWDER, FOR SOLUTION ORAL at 05:29

## 2024-03-24 RX ADMIN — LISINOPRIL 20 MILLIGRAM(S): 2.5 TABLET ORAL at 05:29

## 2024-03-24 RX ADMIN — Medication 1 APPLICATION(S): at 14:03

## 2024-03-24 RX ADMIN — Medication 1 SPRAY(S): at 14:03

## 2024-03-24 RX ADMIN — Medication 10 MILLIGRAM(S): at 20:11

## 2024-03-24 RX ADMIN — Medication 5 MILLIGRAM(S): at 17:39

## 2024-03-24 RX ADMIN — Medication 1 APPLICATION(S): at 21:31

## 2024-03-24 RX ADMIN — GABAPENTIN 200 MILLIGRAM(S): 400 CAPSULE ORAL at 21:31

## 2024-03-24 RX ADMIN — METFORMIN HYDROCHLORIDE 500 MILLIGRAM(S): 850 TABLET ORAL at 17:38

## 2024-03-24 RX ADMIN — Medication 81 MILLIGRAM(S): at 12:30

## 2024-03-24 RX ADMIN — POLYETHYLENE GLYCOL 3350 17 GRAM(S): 17 POWDER, FOR SOLUTION ORAL at 17:39

## 2024-03-24 RX ADMIN — Medication 1 APPLICATION(S): at 05:27

## 2024-03-24 RX ADMIN — Medication 1 SPRAY(S): at 21:31

## 2024-03-24 RX ADMIN — ATORVASTATIN CALCIUM 80 MILLIGRAM(S): 80 TABLET, FILM COATED ORAL at 21:31

## 2024-03-24 RX ADMIN — Medication 1 SPRAY(S): at 05:31

## 2024-03-24 RX ADMIN — AMLODIPINE BESYLATE 10 MILLIGRAM(S): 2.5 TABLET ORAL at 05:29

## 2024-03-24 RX ADMIN — Medication 1: at 13:02

## 2024-03-24 RX ADMIN — DONEPEZIL HYDROCHLORIDE 5 MILLIGRAM(S): 10 TABLET, FILM COATED ORAL at 09:29

## 2024-03-24 RX ADMIN — ENOXAPARIN SODIUM 30 MILLIGRAM(S): 100 INJECTION SUBCUTANEOUS at 20:11

## 2024-03-24 RX ADMIN — CLOPIDOGREL BISULFATE 75 MILLIGRAM(S): 75 TABLET, FILM COATED ORAL at 12:30

## 2024-03-24 NOTE — PROGRESS NOTE ADULT - NSPROGADDITIONALINFOA_GEN_ALL_CORE
I have personally interviewed and examined this patient, reviewed pertinent clinical information, and performed the evaluation and management services provided at today's visit for inpatient medical follow up    I am available to discuss any issues related to the medical care of this patient on the unit this morning, through [employer] Teams Chat or by phone at [phone]
I have personally interviewed and examined this patient, reviewed pertinent clinical information, and performed the evaluation and management services provided at today's visit for inpatient medical follow up    I am available to discuss any issues related to the medical care of this patient on the unit this morning, through [employer] Teams Chat or by phone at [phone]

## 2024-03-24 NOTE — PROGRESS NOTE ADULT - SUBJECTIVE AND OBJECTIVE BOX
Cc: Gait dysfunction    HPI: Patient with no new medical issues today. Seen and examined eating breakfast. No acute overnight events, slept well. Denies pain or discomfort. Noted last BM was 3/20, patient shakes her head "no" when discussing a suppository or enema. Encouraged prune juice.   Pain controlled, no chest pain, no N/V, no Fevers/Chills. No other new ROS  Has been tolerating rehabilitation program.      acetaminophen     Tablet .. 650 milliGRAM(s) Oral every 6 hours PRN  amLODIPine   Tablet 10 milliGRAM(s) Oral daily  AQUAPHOR (petrolatum Ointment) 1 Application(s) Topical three times a day  aspirin enteric coated 81 milliGRAM(s) Oral daily  atorvastatin 80 milliGRAM(s) Oral at bedtime  bisacodyl 5 milliGRAM(s) Oral every 12 hours PRN  clopidogrel Tablet 75 milliGRAM(s) Oral daily  dextrose 5%. 1000 milliLiter(s) IV Continuous <Continuous>  dextrose 5%. 1000 milliLiter(s) IV Continuous <Continuous>  dextrose 50% Injectable 25 Gram(s) IV Push once  dextrose 50% Injectable 12.5 Gram(s) IV Push once  dextrose 50% Injectable 25 Gram(s) IV Push once  dextrose Oral Gel 15 Gram(s) Oral once PRN  donepezil 5 milliGRAM(s) Oral <User Schedule>  enoxaparin Injectable 30 milliGRAM(s) SubCutaneous <User Schedule>  gabapentin 200 milliGRAM(s) Oral at bedtime  glucagon  Injectable 1 milliGRAM(s) IntraMuscular once  insulin lispro (ADMELOG) corrective regimen sliding scale   SubCutaneous three times a day before meals  insulin lispro (ADMELOG) corrective regimen sliding scale   SubCutaneous at bedtime  lisinopril 20 milliGRAM(s) Oral daily  melatonin 3 milliGRAM(s) Oral at bedtime PRN  metFORMIN 500 milliGRAM(s) Oral two times a day with meals  polyethylene glycol 3350 17 Gram(s) Oral two times a day  potassium chloride   Powder 20 milliEquivalent(s) Oral daily  senna 2 Tablet(s) Oral at bedtime  sodium chloride 0.65% Nasal 1 Spray(s) Both Nostrils three times a day          T(C): 36.3 (03-24-24 @ 08:25), Max: 36.3 (03-23-24 @ 20:56)  T(F): 97.3 (03-24-24 @ 08:25), Max: 97.4 (03-23-24 @ 20:56)  HR: 82 (03-24-24 @ 08:25) (67 - 82)  BP: 127/63 (03-24-24 @ 08:25) (113/67 - 127/63)  RR: 16 (03-24-24 @ 08:25) (15 - 16)  SpO2: 95% (03-24-24 @ 08:25) (95% - 99%)                In NAD  HEENT- +thrush  Heart- RRR, S1S2  Lungs- CTA bl.  Abd- + BS, NT  Ext- No calf pain  Neuro- Exam unchanged, answers simple yes/no questions, follows simple commands, increased tone in RUE (MAS 2-3 in biceps, MAS 1+ in triceps)          Imp: Patient with diagnosis of     Left MCA CVA      admitted for comprehensive acute rehabilitation.    Plan:  - CVA - cont ASA/Plavix  - spasticity - stable, partner declined antispasticity meds. Cont gabapentin, tylenol   - HTN - BP controlled on current antihypertensive meds, /70 - 123/73 (3/23), 113/67 - 127/63 (3/24)  - Thrush - completed diflucan, continue oral care   - constipation - last BM 3/20. patient is refusing suppository and enema. Will encourage prune juice as she is amenable to this. If she does not have a BM by today, consider lactulose vs MoM if she continues to refuse supp/enema.   - Continue PT/OT/SLP  - DVT prophylaxis - lovenox 30mg qd   - Skin- Turn q2h, check skin daily  - Continue current medications; patient medically stable.   - Patient is stable to continue current rehabilitation program.

## 2024-03-24 NOTE — PROGRESS NOTE ADULT - SUBJECTIVE AND OBJECTIVE BOX
Patient is a 63y old  Female who presents with a chief complaint of L MCA CVA with expressive aphasia, right hemiparesis, and right facial droop (23 Mar 2024 12:02)    Patient examined and interviewed. There were no acute medical events yesterday or overnight and patient is without complaints this morning.  Expressive aphasia persists able to answer yes no and presently fixated on  turning TV on  Denies HA's, CP, palpitations, shortness of breath or upper respiratory symptoms, nausea, vomiting, diarrhea, constipation, dysuria, bruising/bleeding and all other systems were reviewed as negative      ALLERGIES:  Allergy Status Unknown    MEDICATIONS  (STANDING):  amLODIPine   Tablet 10 milliGRAM(s) Oral daily  AQUAPHOR (petrolatum Ointment) 1 Application(s) Topical three times a day  aspirin enteric coated 81 milliGRAM(s) Oral daily  atorvastatin 80 milliGRAM(s) Oral at bedtime  clopidogrel Tablet 75 milliGRAM(s) Oral daily  dextrose 5%. 1000 milliLiter(s) (50 mL/Hr) IV Continuous <Continuous>  dextrose 5%. 1000 milliLiter(s) (100 mL/Hr) IV Continuous <Continuous>  dextrose 50% Injectable 25 Gram(s) IV Push once  dextrose 50% Injectable 12.5 Gram(s) IV Push once  dextrose 50% Injectable 25 Gram(s) IV Push once  donepezil 5 milliGRAM(s) Oral <User Schedule>  enoxaparin Injectable 30 milliGRAM(s) SubCutaneous <User Schedule>  gabapentin 200 milliGRAM(s) Oral at bedtime  glucagon  Injectable 1 milliGRAM(s) IntraMuscular once  insulin lispro (ADMELOG) corrective regimen sliding scale   SubCutaneous three times a day before meals  insulin lispro (ADMELOG) corrective regimen sliding scale   SubCutaneous at bedtime  lisinopril 20 milliGRAM(s) Oral daily  metFORMIN 500 milliGRAM(s) Oral two times a day with meals  polyethylene glycol 3350 17 Gram(s) Oral two times a day  potassium chloride   Powder 20 milliEquivalent(s) Oral daily  senna 2 Tablet(s) Oral at bedtime  sodium chloride 0.65% Nasal 1 Spray(s) Both Nostrils three times a day    MEDICATIONS  (PRN):  acetaminophen     Tablet .. 650 milliGRAM(s) Oral every 6 hours PRN Temp greater or equal to 38C (100.4F), Mild Pain (1 - 3)  bisacodyl 5 milliGRAM(s) Oral every 12 hours PRN Constipation  dextrose Oral Gel 15 Gram(s) Oral once PRN Blood Glucose LESS THAN 70 milliGRAM(s)/deciliter  melatonin 3 milliGRAM(s) Oral at bedtime PRN Insomnia    Vital Signs Last 24 Hrs  T(F): 97.4 (23 Mar 2024 20:56), Max: 97.4 (23 Mar 2024 20:56)  HR: 67 (24 Mar 2024 05:30) (67 - 74)  BP: 118/71 (24 Mar 2024 05:30) (113/67 - 118/74)  RR: 15 (23 Mar 2024 20:56) (15 - 15)  SpO2: 97% (23 Mar 2024 20:56) (97% - 99%)  I&O's Summary            POCT Blood Glucose.: 126 mg/dL (24 Mar 2024 08:20)  POCT Blood Glucose.: 109 mg/dL (23 Mar 2024 21:45)  POCT Blood Glucose.: 110 mg/dL (23 Mar 2024 16:21)  POCT Blood Glucose.: 277 mg/dL (23 Mar 2024 11:13)    PHYSICAL EXAM:  General: NAD, Alert responsive  ENT: MMM  Neck: Supple, No JVD  Lungs: Clear to auscultation bilaterally  Cardio: RRR, S1/S2, No murmurs  Abdomen: Soft, Nontender, Nondistended; Bowel sounds present  Extremities: No calf tenderness, No pitting edema  Neuro expressive aphasia no new deficits      LABS: There are no new laboratory or radiologic studies resulted at the time this progress note was authored                          03-02 Chol 165 mg/dL LDL -- HDL 54 mg/dL Trig 92 mg/dL

## 2024-03-25 VITALS
RESPIRATION RATE: 16 BRPM | SYSTOLIC BLOOD PRESSURE: 105 MMHG | OXYGEN SATURATION: 100 % | TEMPERATURE: 98 F | HEART RATE: 80 BPM | DIASTOLIC BLOOD PRESSURE: 71 MMHG

## 2024-03-25 LAB
ALBUMIN SERPL ELPH-MCNC: 3.5 G/DL — SIGNIFICANT CHANGE UP (ref 3.3–5)
ALP SERPL-CCNC: 63 U/L — SIGNIFICANT CHANGE UP (ref 40–120)
ALT FLD-CCNC: 27 U/L — SIGNIFICANT CHANGE UP (ref 10–45)
ANION GAP SERPL CALC-SCNC: 13 MMOL/L — SIGNIFICANT CHANGE UP (ref 5–17)
AST SERPL-CCNC: 23 U/L — SIGNIFICANT CHANGE UP (ref 10–40)
BASOPHILS # BLD AUTO: 0.06 K/UL — SIGNIFICANT CHANGE UP (ref 0–0.2)
BASOPHILS NFR BLD AUTO: 0.6 % — SIGNIFICANT CHANGE UP (ref 0–2)
BILIRUB SERPL-MCNC: 0.6 MG/DL — SIGNIFICANT CHANGE UP (ref 0.2–1.2)
BUN SERPL-MCNC: 16 MG/DL — SIGNIFICANT CHANGE UP (ref 7–23)
CALCIUM SERPL-MCNC: 9.3 MG/DL — SIGNIFICANT CHANGE UP (ref 8.4–10.5)
CHLORIDE SERPL-SCNC: 96 MMOL/L — SIGNIFICANT CHANGE UP (ref 96–108)
CO2 SERPL-SCNC: 24 MMOL/L — SIGNIFICANT CHANGE UP (ref 22–31)
CREAT SERPL-MCNC: 0.64 MG/DL — SIGNIFICANT CHANGE UP (ref 0.5–1.3)
EGFR: 99 ML/MIN/1.73M2 — SIGNIFICANT CHANGE UP
EOSINOPHIL # BLD AUTO: 0.04 K/UL — SIGNIFICANT CHANGE UP (ref 0–0.5)
EOSINOPHIL NFR BLD AUTO: 0.4 % — SIGNIFICANT CHANGE UP (ref 0–6)
GLUCOSE BLDC GLUCOMTR-MCNC: 148 MG/DL — HIGH (ref 70–99)
GLUCOSE BLDC GLUCOMTR-MCNC: 159 MG/DL — HIGH (ref 70–99)
GLUCOSE SERPL-MCNC: 125 MG/DL — HIGH (ref 70–99)
HCT VFR BLD CALC: 30.2 % — LOW (ref 34.5–45)
HGB BLD-MCNC: 9.8 G/DL — LOW (ref 11.5–15.5)
IMM GRANULOCYTES NFR BLD AUTO: 0.5 % — SIGNIFICANT CHANGE UP (ref 0–0.9)
LYMPHOCYTES # BLD AUTO: 19.4 % — SIGNIFICANT CHANGE UP (ref 13–44)
LYMPHOCYTES # BLD AUTO: 2.09 K/UL — SIGNIFICANT CHANGE UP (ref 1–3.3)
MCHC RBC-ENTMCNC: 28.7 PG — SIGNIFICANT CHANGE UP (ref 27–34)
MCHC RBC-ENTMCNC: 32.5 GM/DL — SIGNIFICANT CHANGE UP (ref 32–36)
MCV RBC AUTO: 88.6 FL — SIGNIFICANT CHANGE UP (ref 80–100)
MONOCYTES # BLD AUTO: 0.58 K/UL — SIGNIFICANT CHANGE UP (ref 0–0.9)
MONOCYTES NFR BLD AUTO: 5.4 % — SIGNIFICANT CHANGE UP (ref 2–14)
NEUTROPHILS # BLD AUTO: 7.96 K/UL — HIGH (ref 1.8–7.4)
NEUTROPHILS NFR BLD AUTO: 73.7 % — SIGNIFICANT CHANGE UP (ref 43–77)
NRBC # BLD: 0 /100 WBCS — SIGNIFICANT CHANGE UP (ref 0–0)
PLATELET # BLD AUTO: 316 K/UL — SIGNIFICANT CHANGE UP (ref 150–400)
POTASSIUM SERPL-MCNC: 3.9 MMOL/L — SIGNIFICANT CHANGE UP (ref 3.5–5.3)
POTASSIUM SERPL-SCNC: 3.9 MMOL/L — SIGNIFICANT CHANGE UP (ref 3.5–5.3)
PROT SERPL-MCNC: 6.8 G/DL — SIGNIFICANT CHANGE UP (ref 6–8.3)
RBC # BLD: 3.41 M/UL — LOW (ref 3.8–5.2)
RBC # FLD: 12 % — SIGNIFICANT CHANGE UP (ref 10.3–14.5)
SODIUM SERPL-SCNC: 133 MMOL/L — LOW (ref 135–145)
WBC # BLD: 10.78 K/UL — HIGH (ref 3.8–10.5)
WBC # FLD AUTO: 10.78 K/UL — HIGH (ref 3.8–10.5)

## 2024-03-25 PROCEDURE — 92610 EVALUATE SWALLOWING FUNCTION: CPT | Mod: GN

## 2024-03-25 PROCEDURE — 97530 THERAPEUTIC ACTIVITIES: CPT | Mod: GP

## 2024-03-25 PROCEDURE — 81001 URINALYSIS AUTO W/SCOPE: CPT

## 2024-03-25 PROCEDURE — 92523 SPEECH SOUND LANG COMPREHEN: CPT | Mod: GN

## 2024-03-25 PROCEDURE — 80048 BASIC METABOLIC PNL TOTAL CA: CPT

## 2024-03-25 PROCEDURE — 92507 TX SP LANG VOICE COMM INDIV: CPT | Mod: GN

## 2024-03-25 PROCEDURE — 87086 URINE CULTURE/COLONY COUNT: CPT

## 2024-03-25 PROCEDURE — 93970 EXTREMITY STUDY: CPT

## 2024-03-25 PROCEDURE — 97110 THERAPEUTIC EXERCISES: CPT | Mod: GP

## 2024-03-25 PROCEDURE — 97112 NEUROMUSCULAR REEDUCATION: CPT | Mod: GP

## 2024-03-25 PROCEDURE — 82962 GLUCOSE BLOOD TEST: CPT

## 2024-03-25 PROCEDURE — 85027 COMPLETE CBC AUTOMATED: CPT

## 2024-03-25 PROCEDURE — 97140 MANUAL THERAPY 1/> REGIONS: CPT | Mod: GO

## 2024-03-25 PROCEDURE — 97116 GAIT TRAINING THERAPY: CPT | Mod: GP

## 2024-03-25 PROCEDURE — 92526 ORAL FUNCTION THERAPY: CPT | Mod: GN

## 2024-03-25 PROCEDURE — 80053 COMPREHEN METABOLIC PANEL: CPT

## 2024-03-25 PROCEDURE — 97535 SELF CARE MNGMENT TRAINING: CPT | Mod: GO

## 2024-03-25 PROCEDURE — 83735 ASSAY OF MAGNESIUM: CPT

## 2024-03-25 PROCEDURE — 99232 SBSQ HOSP IP/OBS MODERATE 35: CPT

## 2024-03-25 PROCEDURE — 87637 SARSCOV2&INF A&B&RSV AMP PRB: CPT

## 2024-03-25 PROCEDURE — 36415 COLL VENOUS BLD VENIPUNCTURE: CPT

## 2024-03-25 PROCEDURE — 85025 COMPLETE CBC W/AUTO DIFF WBC: CPT

## 2024-03-25 PROCEDURE — 97165 OT EVAL LOW COMPLEX 30 MIN: CPT | Mod: GO

## 2024-03-25 PROCEDURE — 99239 HOSP IP/OBS DSCHRG MGMT >30: CPT

## 2024-03-25 PROCEDURE — 97161 PT EVAL LOW COMPLEX 20 MIN: CPT | Mod: GP

## 2024-03-25 RX ADMIN — METFORMIN HYDROCHLORIDE 500 MILLIGRAM(S): 850 TABLET ORAL at 08:47

## 2024-03-25 RX ADMIN — AMLODIPINE BESYLATE 10 MILLIGRAM(S): 2.5 TABLET ORAL at 06:03

## 2024-03-25 RX ADMIN — Medication 1 APPLICATION(S): at 06:04

## 2024-03-25 RX ADMIN — Medication 1: at 11:47

## 2024-03-25 RX ADMIN — DONEPEZIL HYDROCHLORIDE 5 MILLIGRAM(S): 10 TABLET, FILM COATED ORAL at 08:47

## 2024-03-25 RX ADMIN — CLOPIDOGREL BISULFATE 75 MILLIGRAM(S): 75 TABLET, FILM COATED ORAL at 11:47

## 2024-03-25 RX ADMIN — Medication 20 MILLIEQUIVALENT(S): at 11:47

## 2024-03-25 RX ADMIN — LISINOPRIL 20 MILLIGRAM(S): 2.5 TABLET ORAL at 06:02

## 2024-03-25 RX ADMIN — Medication 81 MILLIGRAM(S): at 11:47

## 2024-03-25 NOTE — PROGRESS NOTE ADULT - NUTRITIONAL ASSESSMENT
This patient has been assessed with a concern for Malnutrition and has been determined to have a diagnosis/diagnoses of Severe protein-calorie malnutrition and Underweight (BMI < 19).    This patient is being managed with:   Diet Minced and Moist-  Supplement Feeding Modality:  Oral  Glucerna Shake Cans or Servings Per Day:  1       Frequency:  Two Times a day  Entered: Mar  5 2024  4:06PM  
This patient has been assessed with a concern for Malnutrition and has been determined to have a diagnosis/diagnoses of Severe protein-calorie malnutrition and Underweight (BMI < 19).    This patient is being managed with:   Diet Minced and Moist-  Supplement Feeding Modality:  Oral  Glucerna Shake Cans or Servings Per Day:  1       Frequency:  Two Times a day  Entered: Mar  5 2024  4:06PM  
This patient has been assessed with a concern for Malnutrition and has been determined to have a diagnosis/diagnoses of Severe protein-calorie malnutrition and Underweight (BMI < 19).    This patient is being managed with:   Diet Soft and Bite Sized-  Supplement Feeding Modality:  Oral  Glucerna Shake Cans or Servings Per Day:  1       Frequency:  Two Times a day  Entered: Mar 19 2024 10:04AM  
This patient has been assessed with a concern for Malnutrition and has been determined to have a diagnosis/diagnoses of Severe protein-calorie malnutrition and Underweight (BMI < 19).    This patient is being managed with:   Diet Minced and Moist-  Supplement Feeding Modality:  Oral  Glucerna Shake Cans or Servings Per Day:  1       Frequency:  Two Times a day  Entered: Mar  5 2024  4:06PM  
This patient has been assessed with a concern for Malnutrition and has been determined to have a diagnosis/diagnoses of Severe protein-calorie malnutrition and Underweight (BMI < 19).    This patient is being managed with:   Diet Minced and Moist-  Supplement Feeding Modality:  Oral  Glucerna Shake Cans or Servings Per Day:  1       Frequency:  Two Times a day  Entered: Mar  5 2024  4:06PM  
This patient has been assessed with a concern for Malnutrition and has been determined to have a diagnosis/diagnoses of Severe protein-calorie malnutrition and Underweight (BMI < 19).    This patient is being managed with:   Diet Soft and Bite Sized-  Supplement Feeding Modality:  Oral  Glucerna Shake Cans or Servings Per Day:  1       Frequency:  Two Times a day  Entered: Mar 19 2024 10:04AM  
This patient has been assessed with a concern for Malnutrition and has been determined to have a diagnosis/diagnoses of Severe protein-calorie malnutrition and Underweight (BMI < 19).    This patient is being managed with:   Diet Minced and Moist-  Supplement Feeding Modality:  Oral  Glucerna Shake Cans or Servings Per Day:  1       Frequency:  Two Times a day  Entered: Mar  5 2024  4:06PM  
This patient has been assessed with a concern for Malnutrition and has been determined to have a diagnosis/diagnoses of Severe protein-calorie malnutrition and Underweight (BMI < 19).    This patient is being managed with:   Diet Minced and Moist-  Supplement Feeding Modality:  Oral  Glucerna Shake Cans or Servings Per Day:  1       Frequency:  Two Times a day  Entered: Mar 15 2024  9:29AM  
This patient has been assessed with a concern for Malnutrition and has been determined to have a diagnosis/diagnoses of Severe protein-calorie malnutrition and Underweight (BMI < 19).    This patient is being managed with:   Diet Soft and Bite Sized-  Supplement Feeding Modality:  Oral  Glucerna Shake Cans or Servings Per Day:  1       Frequency:  Two Times a day  Entered: Mar 19 2024 10:04AM  
This patient has been assessed with a concern for Malnutrition and has been determined to have a diagnosis/diagnoses of Severe protein-calorie malnutrition and Underweight (BMI < 19).    This patient is being managed with:   Diet Minced and Moist-  Supplement Feeding Modality:  Oral  Glucerna Shake Cans or Servings Per Day:  1       Frequency:  Two Times a day  Entered: Mar  5 2024  4:06PM  
This patient has been assessed with a concern for Malnutrition and has been determined to have a diagnosis/diagnoses of Severe protein-calorie malnutrition and Underweight (BMI < 19).    This patient is being managed with:   Diet Soft and Bite Sized-  Supplement Feeding Modality:  Oral  Glucerna Shake Cans or Servings Per Day:  1       Frequency:  Two Times a day  Entered: Mar 19 2024 10:04AM  
This patient has been assessed with a concern for Malnutrition and has been determined to have a diagnosis/diagnoses of Severe protein-calorie malnutrition and Underweight (BMI < 19).    This patient is being managed with:   Diet Soft and Bite Sized-  Supplement Feeding Modality:  Oral  Glucerna Shake Cans or Servings Per Day:  1       Frequency:  Two Times a day  Entered: Mar 19 2024 10:04AM  
This patient has been assessed with a concern for Malnutrition and has been determined to have a diagnosis/diagnoses of Severe protein-calorie malnutrition and Underweight (BMI < 19).    This patient is being managed with:   Diet Minced and Moist-  Supplement Feeding Modality:  Oral  Glucerna Shake Cans or Servings Per Day:  1       Frequency:  Two Times a day  Entered: Mar  5 2024  4:06PM  
This patient has been assessed with a concern for Malnutrition and has been determined to have a diagnosis/diagnoses of Severe protein-calorie malnutrition and Underweight (BMI < 19).    This patient is being managed with:   Diet Minced and Moist-  Supplement Feeding Modality:  Oral  Glucerna Shake Cans or Servings Per Day:  1       Frequency:  Two Times a day  Entered: Mar  5 2024  4:06PM  
This patient has been assessed with a concern for Malnutrition and has been determined to have a diagnosis/diagnoses of Severe protein-calorie malnutrition and Underweight (BMI < 19).    This patient is being managed with:   Diet Soft and Bite Sized-  Supplement Feeding Modality:  Oral  Glucerna Shake Cans or Servings Per Day:  1       Frequency:  Two Times a day  Entered: Mar 19 2024 10:04AM  
This patient has been assessed with a concern for Malnutrition and has been determined to have a diagnosis/diagnoses of Severe protein-calorie malnutrition and Underweight (BMI < 19).    This patient is being managed with:   Diet Soft and Bite Sized-  Supplement Feeding Modality:  Oral  Glucerna Shake Cans or Servings Per Day:  1       Frequency:  Two Times a day  Entered: Mar 19 2024 10:04AM  
This patient has been assessed with a concern for Malnutrition and has been determined to have a diagnosis/diagnoses of Severe protein-calorie malnutrition and Underweight (BMI < 19).    This patient is being managed with:   Diet Minced and Moist-  Supplement Feeding Modality:  Oral  Glucerna Shake Cans or Servings Per Day:  1       Frequency:  Two Times a day  Entered: Mar  5 2024  4:06PM  

## 2024-03-25 NOTE — PROGRESS NOTE ADULT - SUBJECTIVE AND OBJECTIVE BOX
Patient is a 63y old  Female who presents with a chief complaint of L MCA CVA with expressive aphasia, right hemiparesis, and right facial droop (24 Mar 2024 10:01)      HPI:  This ia a 64 YO female RH dominant with PMH HTN, HLD, DM2, CVA (10/2023), who presented to  [facility] ED on 3/1/24  following a fall at home. According to the partner, Connie, the patient was in her usual state of health up until 5 days prior to admission when she was noted to have right sided facial, arm and leg weakness, aphasia and episodes of confusion. She took ASA 81mg with improvement of symptoms; pt was able to ambulate and function at home but felt weak. On the day prior to admission, the patient fell prompting her partner to bring her to the ED.    On arrival, pt noted to have dense right sided facial droop and right sided hemiparesis and aphasia.  MRI brain demonstrated multiple small acute infarctions in the left MCA distribution and chronic ischemic changes from prior strokes. CTA head/neck: negative for LVO occlusion. Patient was evaluated by neurology, and recommended for ASA & plavix x 3 months.    Patient was evaluated by PM&R and therapy for functional deficits, gait/ADL impairments and acute rehabilitation was recommended. Patient was discharged to  [facility] on 3/4/24. (04 Mar 2024 13:15)      PAST MEDICAL & SURGICAL HISTORY:  HTN (hypertension)      DM (diabetes mellitus)      CVA (cerebrovascular accident)      No significant past surgical history          MEDICATIONS  (STANDING):  amLODIPine   Tablet 10 milliGRAM(s) Oral daily  AQUAPHOR (petrolatum Ointment) 1 Application(s) Topical three times a day  aspirin enteric coated 81 milliGRAM(s) Oral daily  atorvastatin 80 milliGRAM(s) Oral at bedtime  clopidogrel Tablet 75 milliGRAM(s) Oral daily  dextrose 5%. 1000 milliLiter(s) (100 mL/Hr) IV Continuous <Continuous>  dextrose 5%. 1000 milliLiter(s) (50 mL/Hr) IV Continuous <Continuous>  dextrose 50% Injectable 25 Gram(s) IV Push once  dextrose 50% Injectable 12.5 Gram(s) IV Push once  dextrose 50% Injectable 25 Gram(s) IV Push once  donepezil 5 milliGRAM(s) Oral <User Schedule>  enoxaparin Injectable 30 milliGRAM(s) SubCutaneous <User Schedule>  gabapentin 200 milliGRAM(s) Oral at bedtime  glucagon  Injectable 1 milliGRAM(s) IntraMuscular once  insulin lispro (ADMELOG) corrective regimen sliding scale   SubCutaneous three times a day before meals  insulin lispro (ADMELOG) corrective regimen sliding scale   SubCutaneous at bedtime  lisinopril 20 milliGRAM(s) Oral daily  metFORMIN 500 milliGRAM(s) Oral two times a day with meals  polyethylene glycol 3350 17 Gram(s) Oral two times a day  potassium chloride   Powder 20 milliEquivalent(s) Oral daily  senna 2 Tablet(s) Oral at bedtime  sodium chloride 0.65% Nasal 1 Spray(s) Both Nostrils three times a day    MEDICATIONS  (PRN):  acetaminophen     Tablet .. 650 milliGRAM(s) Oral every 6 hours PRN Temp greater or equal to 38C (100.4F), Mild Pain (1 - 3)  bisacodyl 5 milliGRAM(s) Oral every 12 hours PRN Constipation  dextrose Oral Gel 15 Gram(s) Oral once PRN Blood Glucose LESS THAN 70 milliGRAM(s)/deciliter  melatonin 3 milliGRAM(s) Oral at bedtime PRN Insomnia      Allergies    Allergy Status Unknown    Intolerances          VITALS  63y  Vital Signs Last 24 Hrs  T(C): 36.6 (24 Mar 2024 19:53), Max: 36.6 (24 Mar 2024 19:53)  T(F): 97.9 (24 Mar 2024 19:53), Max: 97.9 (24 Mar 2024 19:53)  HR: 92 (25 Mar 2024 06:05) (77 - 92)  BP: 156/77 (25 Mar 2024 06:05) (121/71 - 156/77)  BP(mean): --  RR: 15 (24 Mar 2024 19:53) (15 - 15)  SpO2: 99% (24 Mar 2024 19:53) (99% - 99%)    Parameters below as of 24 Mar 2024 19:53  Patient On (Oxygen Delivery Method): room air      Daily     Daily         RECENT LABS:                          9.8    10.78 )-----------( 316      ( 25 Mar 2024 08:08 )             30.2                     CAPILLARY BLOOD GLUCOSE      POCT Blood Glucose.: 148 mg/dL (25 Mar 2024 08:29)  POCT Blood Glucose.: 106 mg/dL (24 Mar 2024 21:29)  POCT Blood Glucose.: 106 mg/dL (24 Mar 2024 16:37)  POCT Blood Glucose.: 152 mg/dL (24 Mar 2024 12:27)               Patient is a 63y old  Female who presents with a chief complaint of L MCA CVA with expressive aphasia, right hemiparesis, and right facial droop (24 Mar 2024 10:01)      HPI:  This ia a 64 YO female RH dominant with PMH HTN, HLD, DM2, CVA (10/2023), who presented to  [facility] ED on 3/1/24  following a fall at home. According to the partner, Connie, the patient was in her usual state of health up until 5 days prior to admission when she was noted to have right sided facial, arm and leg weakness, aphasia and episodes of confusion. She took ASA 81mg with improvement of symptoms; pt was able to ambulate and function at home but felt weak. On the day prior to admission, the patient fell prompting her partner to bring her to the ED.    On arrival, pt noted to have dense right sided facial droop and right sided hemiparesis and aphasia.  MRI brain demonstrated multiple small acute infarctions in the left MCA distribution and chronic ischemic changes from prior strokes. CTA head/neck: negative for LVO occlusion. Patient was evaluated by neurology, and recommended for ASA & plavix x 3 months.    Patient was evaluated by PM&R and therapy for functional deficits, gait/ADL impairments and acute rehabilitation was recommended. Patient was discharged to  [facility] on 3/4/24. (04 Mar 2024 13:15)      PAST MEDICAL & SURGICAL HISTORY:  HTN (hypertension)      DM (diabetes mellitus)      CVA (cerebrovascular accident)      No significant past surgical history          MEDICATIONS  (STANDING):  amLODIPine   Tablet 10 milliGRAM(s) Oral daily  AQUAPHOR (petrolatum Ointment) 1 Application(s) Topical three times a day  aspirin enteric coated 81 milliGRAM(s) Oral daily  atorvastatin 80 milliGRAM(s) Oral at bedtime  clopidogrel Tablet 75 milliGRAM(s) Oral daily  dextrose 5%. 1000 milliLiter(s) (100 mL/Hr) IV Continuous <Continuous>  dextrose 5%. 1000 milliLiter(s) (50 mL/Hr) IV Continuous <Continuous>  dextrose 50% Injectable 25 Gram(s) IV Push once  dextrose 50% Injectable 12.5 Gram(s) IV Push once  dextrose 50% Injectable 25 Gram(s) IV Push once  donepezil 5 milliGRAM(s) Oral <User Schedule>  enoxaparin Injectable 30 milliGRAM(s) SubCutaneous <User Schedule>  gabapentin 200 milliGRAM(s) Oral at bedtime  glucagon  Injectable 1 milliGRAM(s) IntraMuscular once  insulin lispro (ADMELOG) corrective regimen sliding scale   SubCutaneous three times a day before meals  insulin lispro (ADMELOG) corrective regimen sliding scale   SubCutaneous at bedtime  lisinopril 20 milliGRAM(s) Oral daily  metFORMIN 500 milliGRAM(s) Oral two times a day with meals  polyethylene glycol 3350 17 Gram(s) Oral two times a day  potassium chloride   Powder 20 milliEquivalent(s) Oral daily  senna 2 Tablet(s) Oral at bedtime  sodium chloride 0.65% Nasal 1 Spray(s) Both Nostrils three times a day    MEDICATIONS  (PRN):  acetaminophen     Tablet .. 650 milliGRAM(s) Oral every 6 hours PRN Temp greater or equal to 38C (100.4F), Mild Pain (1 - 3)  bisacodyl 5 milliGRAM(s) Oral every 12 hours PRN Constipation  dextrose Oral Gel 15 Gram(s) Oral once PRN Blood Glucose LESS THAN 70 milliGRAM(s)/deciliter  melatonin 3 milliGRAM(s) Oral at bedtime PRN Insomnia      Allergies    Allergy Status Unknown    Intolerances          VITALS  63y  Vital Signs Last 24 Hrs  T(C): 36.6 (24 Mar 2024 19:53), Max: 36.6 (24 Mar 2024 19:53)  T(F): 97.9 (24 Mar 2024 19:53), Max: 97.9 (24 Mar 2024 19:53)  HR: 92 (25 Mar 2024 06:05) (77 - 92)  BP: 156/77 (25 Mar 2024 06:05) (121/71 - 156/77)  BP(mean): --  RR: 15 (24 Mar 2024 19:53) (15 - 15)  SpO2: 99% (24 Mar 2024 19:53) (99% - 99%)    Parameters below as of 24 Mar 2024 19:53  Patient On (Oxygen Delivery Method): room air      Daily     Daily         RECENT LABS:                          9.8    10.78 )-----------( 316      ( 25 Mar 2024 08:08 )             30.2       03-25    133<L>  |  96  |  16  ----------------------------<  125<H>  3.9   |  24  |  0.64    Ca    9.3      25 Mar 2024 08:08    TPro  6.8  /  Alb  3.5  /  TBili  0.6  /  DBili  x   /  AST  23  /  ALT  27  /  AlkPhos  63  03-25                CAPILLARY BLOOD GLUCOSE      POCT Blood Glucose.: 148 mg/dL (25 Mar 2024 08:29)  POCT Blood Glucose.: 106 mg/dL (24 Mar 2024 21:29)  POCT Blood Glucose.: 106 mg/dL (24 Mar 2024 16:37)  POCT Blood Glucose.: 152 mg/dL (24 Mar 2024 12:27)

## 2024-03-25 NOTE — PROGRESS NOTE ADULT - PROVIDER SPECIALTY LIST ADULT
Hospice
Hospitalist
Internal Medicine
Physiatry
Physiatry
Rehab Medicine
Rehab Medicine
Hospitalist
Physiatry
Physiatry
Hospitalist
Hospitalist
Physiatry
Rehab Medicine
Rehab Medicine
Hospitalist
Hospitalist
Internal Medicine
Physiatry
Rehab Medicine
Physiatry

## 2024-03-25 NOTE — CHART NOTE - NSCHARTNOTEFT_GEN_A_CORE
NUTRITION FOLLOW UP    SOURCE: Patient [X]   Family [X]    Medical Record [X]    DIET: Diet, Soft and Bite Sized:   Supplement Feeding Modality:  Oral  Glucerna Shake Cans or Servings Per Day:  1       Frequency:  Two Times a day (03-19-24 @ 10:04) [Active]  ALLERGIES: NKFA    IMPRESSION:  Met with pt and partner Connie this AM at bedside. Pt was seated upright in bed, she and partner were able to articulate her nutrition hx well. Pt reported very poor appetite d/t pain and overall not feeling well, with poor acceptance to glucerna BID. Pt denied N/V/D, however endorsed constant constipation, last BM 3/24. RD encouraged adequate fluids and fiber to assist with BMs and continue with prune juice daily. RD also encouraged PO and protein intake as PO intake has been <50% - reviewed food preferences with patient (will note in kardex); pt to trial berry smoothie (recipe below) @ L daily to assist with PO and protein intake.    *Note: Pt currently hyponatremic 133 mmol/L (3/25)  - POCT BG x 24 hours:  POCT Blood Glucose.: 148 mg/dL (25 Mar 2024 08:29)  POCT Blood Glucose.: 106 mg/dL (24 Mar 2024 21:29)  POCT Blood Glucose.: 106 mg/dL (24 Mar 2024 16:37)  POCT Blood Glucose.: 152 mg/dL (24 Mar 2024 12:27)    PATIENT REPORT: [X] constipation    PO INTAKE: variable per meal    CURRENT WEIGHT: 87.3 lbs (3/4)  WEIGHT HX: N/A    PERTINENT MEDS:   Pertinent Medications: MEDICATIONS  (STANDING):  amLODIPine   Tablet 10 milliGRAM(s) Oral daily  AQUAPHOR (petrolatum Ointment) 1 Application(s) Topical three times a day  aspirin enteric coated 81 milliGRAM(s) Oral daily  atorvastatin 80 milliGRAM(s) Oral at bedtime  clopidogrel Tablet 75 milliGRAM(s) Oral daily  dextrose 5%. 1000 milliLiter(s) (100 mL/Hr) IV Continuous <Continuous>  dextrose 5%. 1000 milliLiter(s) (50 mL/Hr) IV Continuous <Continuous>  dextrose 50% Injectable 25 Gram(s) IV Push once  dextrose 50% Injectable 12.5 Gram(s) IV Push once  dextrose 50% Injectable 25 Gram(s) IV Push once  donepezil 5 milliGRAM(s) Oral <User Schedule>  enoxaparin Injectable 30 milliGRAM(s) SubCutaneous <User Schedule>  gabapentin 200 milliGRAM(s) Oral at bedtime  glucagon  Injectable 1 milliGRAM(s) IntraMuscular once  insulin lispro (ADMELOG) corrective regimen sliding scale   SubCutaneous three times a day before meals  insulin lispro (ADMELOG) corrective regimen sliding scale   SubCutaneous at bedtime  lisinopril 20 milliGRAM(s) Oral daily  metFORMIN 500 milliGRAM(s) Oral two times a day with meals  polyethylene glycol 3350 17 Gram(s) Oral two times a day  potassium chloride   Powder 20 milliEquivalent(s) Oral daily  senna 2 Tablet(s) Oral at bedtime  sodium chloride 0.65% Nasal 1 Spray(s) Both Nostrils three times a day    MEDICATIONS  (PRN):  acetaminophen     Tablet .. 650 milliGRAM(s) Oral every 6 hours PRN Temp greater or equal to 38C (100.4F), Mild Pain (1 - 3)  bisacodyl 5 milliGRAM(s) Oral every 12 hours PRN Constipation  dextrose Oral Gel 15 Gram(s) Oral once PRN Blood Glucose LESS THAN 70 milliGRAM(s)/deciliter  melatonin 3 milliGRAM(s) Oral at bedtime PRN Insomnia    PERTINENT LABS:  03-25 Na133 mmol/L<L> Glu 125 mg/dL<H> K+ 3.9 mmol/L Cr  0.64 mg/dL BUN 16 mg/dL 03-25 Alb 3.5 g/dL 03-02 Chol 165 mg/dL LDL --    HDL 54 mg/dL Trig 92 mg/dL    SKIN: no pressure injury per RN flowsheets    EDEMA: no edema per RN flowsheets    ESTIMATED NEEDS:   [X] no change since previous assessment     PREVIOUS NUTRITION DIAGNOSIS: 1. Severe protein-kcal malnutrition; 2. Swallowing difficulty    NUTRITION DIAGNOSIS is: [X] Ongoing - addressed with Glucerna BID; SLP following    NEW NUTRITION DIAGNOSIS: N/A    MONITORING AND EVALUATION:   Current diet order is appropriate and is well tolerated, will continue to monitor:  - Food and nutrient intake/POs  - Nutrition related lab value, specifically POCT BG, Na  - Weight/weight trends  - GI functions  - Supplement acceptance    NUTRITION RECOMMENDATIONS:   1. Continue with regular, soft + bite sized diet  - RD will continue to follow POCT BG  - Continue to encourage adequate PO and protein intake  - Diet texture per SLP  - [place] food preferences  - Appreciate continued PO intake % documentation in RN flowsheets  2. Change Glucerna to qd  - Provides 220 kcal, 10 g pro per serving  3. RD added smoothie @ L daily  - Recipe: 3/4 cup mixed berries + 1 container vanilla greek yogurt + 1 prosource packet + splash of H2O (variable to consistency)  - Prosource packet provides 60 kcal, 15 g pro per serving  4. Appreciate updated weight and weekly weight trends  5. Continue to monitor lytes, specifically Na, and replete prn  6. Recommend MVI for general nutrient coverage    Geno Hernandez RDN also available via TEAMS NUTRITION FOLLOW UP    SOURCE: Patient [X]   Family [X]    Medical Record [X]    DIET: Diet, Soft and Bite Sized:   Supplement Feeding Modality:  Oral  Glucerna Shake Cans or Servings Per Day:  1       Frequency:  Two Times a day (03-19-24 @ 10:04) [Active]  ALLERGIES: NKFA    IMPRESSION:  Met with pt and partner (Connie) this AM at bedside. Pt was seated upright in bed, she and partner were able to articulate her nutrition hx well. Pt reported very poor appetite d/t pain and overall not feeling well, with poor acceptance to glucerna BID. Pt denied N/V/D, however endorsed constant constipation, last BM 3/24. RD encouraged adequate fluids and fiber to assist with BMs and continue with prune juice daily. RD also encouraged PO and protein intake as PO intake has been <50% - reviewed food preferences with patient (will note in kardex); pt to trial berry smoothie (recipe below) @ L daily to assist with PO and protein intake.    *Note: Pt currently hyponatremic 133 mmol/L (3/25)  - POCT BG x 24 hours:  POCT Blood Glucose.: 148 mg/dL (25 Mar 2024 08:29)  POCT Blood Glucose.: 106 mg/dL (24 Mar 2024 21:29)  POCT Blood Glucose.: 106 mg/dL (24 Mar 2024 16:37)  POCT Blood Glucose.: 152 mg/dL (24 Mar 2024 12:27)    PATIENT REPORT: [X] constipation    PO INTAKE: variable per meal    CURRENT WEIGHT: 87.3 lbs (3/4)  WEIGHT HX: N/A    PERTINENT MEDS:   Pertinent Medications: MEDICATIONS  (STANDING):  amLODIPine   Tablet 10 milliGRAM(s) Oral daily  AQUAPHOR (petrolatum Ointment) 1 Application(s) Topical three times a day  aspirin enteric coated 81 milliGRAM(s) Oral daily  atorvastatin 80 milliGRAM(s) Oral at bedtime  clopidogrel Tablet 75 milliGRAM(s) Oral daily  dextrose 5%. 1000 milliLiter(s) (100 mL/Hr) IV Continuous <Continuous>  dextrose 5%. 1000 milliLiter(s) (50 mL/Hr) IV Continuous <Continuous>  dextrose 50% Injectable 25 Gram(s) IV Push once  dextrose 50% Injectable 12.5 Gram(s) IV Push once  dextrose 50% Injectable 25 Gram(s) IV Push once  donepezil 5 milliGRAM(s) Oral <User Schedule>  enoxaparin Injectable 30 milliGRAM(s) SubCutaneous <User Schedule>  gabapentin 200 milliGRAM(s) Oral at bedtime  glucagon  Injectable 1 milliGRAM(s) IntraMuscular once  insulin lispro (ADMELOG) corrective regimen sliding scale   SubCutaneous three times a day before meals  insulin lispro (ADMELOG) corrective regimen sliding scale   SubCutaneous at bedtime  lisinopril 20 milliGRAM(s) Oral daily  metFORMIN 500 milliGRAM(s) Oral two times a day with meals  polyethylene glycol 3350 17 Gram(s) Oral two times a day  potassium chloride   Powder 20 milliEquivalent(s) Oral daily  senna 2 Tablet(s) Oral at bedtime  sodium chloride 0.65% Nasal 1 Spray(s) Both Nostrils three times a day    MEDICATIONS  (PRN):  acetaminophen     Tablet .. 650 milliGRAM(s) Oral every 6 hours PRN Temp greater or equal to 38C (100.4F), Mild Pain (1 - 3)  bisacodyl 5 milliGRAM(s) Oral every 12 hours PRN Constipation  dextrose Oral Gel 15 Gram(s) Oral once PRN Blood Glucose LESS THAN 70 milliGRAM(s)/deciliter  melatonin 3 milliGRAM(s) Oral at bedtime PRN Insomnia    PERTINENT LABS:  03-25 Na133 mmol/L<L> Glu 125 mg/dL<H> K+ 3.9 mmol/L Cr  0.64 mg/dL BUN 16 mg/dL 03-25 Alb 3.5 g/dL 03-02 Chol 165 mg/dL LDL --    HDL 54 mg/dL Trig 92 mg/dL    SKIN: no pressure injury per RN flowsheets    EDEMA: no edema per RN flowsheets    ESTIMATED NEEDS:   [X] no change since previous assessment     PREVIOUS NUTRITION DIAGNOSIS: 1. Severe protein-kcal malnutrition; 2. Swallowing difficulty    NUTRITION DIAGNOSIS is: [X] Ongoing - addressed with Glucerna BID; SLP following    NEW NUTRITION DIAGNOSIS: N/A    MONITORING AND EVALUATION:   Current diet order is appropriate and is well tolerated, will continue to monitor:  - Food and nutrient intake/POs  - Nutrition related lab value, specifically POCT BG, Na  - Weight/weight trends  - GI functions  - Supplement acceptance    NUTRITION RECOMMENDATIONS:   1. Continue with regular, soft + bite sized diet  - RD will continue to follow POCT BG  - Continue to encourage adequate PO and protein intake  - Diet texture per SLP  - [place] food preferences  - Appreciate continued PO intake % documentation in RN flowsheets  2. Change Glucerna to qd  - Provides 220 kcal, 10 g pro per serving  3. RD added smoothie @ L daily  - Recipe: 3/4 cup mixed berries + 1 container vanilla greek yogurt + 1 prosource packet + splash of H2O (variable to consistency)  - Prosource packet provides 60 kcal, 15 g pro per serving  4. Appreciate updated weight and weekly weight trends  5. Continue to monitor lytes, specifically Na, and replete prn  6. Recommend MVI for general nutrient coverage  7. Consider appetite stimulant if poor appetite and PO intake <50% persists    Geno Hernandez RDN also available via TEAMS

## 2024-03-25 NOTE — PROGRESS NOTE ADULT - RESPIRATORY
clear to auscultation bilaterally/no respiratory distress
clear to auscultation bilaterally/respirations non-labored
no respiratory distress
no respiratory distress
normal/clear to auscultation bilaterally/no wheezes/no rales/no rhonchi
no respiratory distress
normal/clear to auscultation bilaterally/no wheezes/no rales/no rhonchi

## 2024-03-25 NOTE — PROGRESS NOTE ADULT - TIME BILLING
- Ordering, reviewing, and interpreting labs, testing, and imaging.  - Independently obtaining a review of systems and performing a physical exam  - Reviewing consultant documentation/recommendations in addition to discussing plan of care with consultants.  - Counselling and educating patient and family regarding interpretation of aforementioned items and plan of care.
Patient seen at bedside, time spent evaluating and treating the patient's illness as well as time spent reviewing labs, radiology, discussing with patient and/or patient's family and discussing the case with a multidisciplinary team.
- Ordering, reviewing, and interpreting labs, testing, and imaging.  - Independently obtaining a review of systems and performing a physical exam  - Reviewing consultant documentation/recommendations in addition to discussing plan of care with consultants.  - Counselling and educating patient and family regarding interpretation of aforementioned items and plan of care.

## 2024-03-25 NOTE — DISCHARGE NOTE NURSING/CASE MANAGEMENT/SOCIAL WORK - PATIENT PORTAL LINK FT
You can access the FollowMyHealth Patient Portal offered by [facility] by registering at the following website: [email or web address]. By joining [employer]’s FollowMyHealth portal, you will also be able to view your health information using other applications (apps) compatible with our system.

## 2024-03-25 NOTE — PROGRESS NOTE ADULT - SUBJECTIVE AND OBJECTIVE BOX
Patient is a 63y old  Female who presents with a chief complaint of L MCA CVA with expressive aphasia, right hemiparesis, and right facial droop (23 Mar 2024 12:02)    Patient examined and interviewed. There were no acute medical events yesterday or overnight and patient is without complaints this morning.  Expressive aphasia persists able to answer yes no.  all other systems were reviewed as negative except if indicated above.      ALLERGIES:  Allergy Status Unknown    MEDICATIONS:  STANDING MEDICATIONS  amLODIPine   Tablet 10 milliGRAM(s) Oral daily, 03-07-24 @ 00:00  AQUAPHOR (petrolatum Ointment) 1 Application(s) Topical three times a day, 03-05-24 @ 10:59  aspirin enteric coated 81 milliGRAM(s) Oral daily, 03-04-24 @ 15:54  atorvastatin 80 milliGRAM(s) Oral at bedtime, 03-04-24 @ 15:55  clopidogrel Tablet 75 milliGRAM(s) Oral daily, 03-04-24 @ 15:54  dextrose 5%. 1000 milliLiter(s) IV Continuous <Continuous>, 03-18-24 @ 16:38  dextrose 5%. 1000 milliLiter(s) IV Continuous <Continuous>, 03-18-24 @ 16:38  dextrose 50% Injectable 25 Gram(s) IV Push once, 03-18-24 @ 16:38  dextrose 50% Injectable 12.5 Gram(s) IV Push once, 03-18-24 @ 16:38  dextrose 50% Injectable 25 Gram(s) IV Push once, 03-18-24 @ 16:38  donepezil 5 milliGRAM(s) Oral <User Schedule>, 03-08-24 @ 09:41  enoxaparin Injectable 30 milliGRAM(s) SubCutaneous <User Schedule>, 03-07-24 @ 20:36  gabapentin 200 milliGRAM(s) Oral at bedtime, 03-04-24 @ 16:37  glucagon  Injectable 1 milliGRAM(s) IntraMuscular once, 03-18-24 @ 16:38  insulin lispro (ADMELOG) corrective regimen sliding scale   SubCutaneous three times a day before meals, 03-18-24 @ 16:38  insulin lispro (ADMELOG) corrective regimen sliding scale   SubCutaneous at bedtime, 03-18-24 @ 22:00  lisinopril 20 milliGRAM(s) Oral daily, 03-04-24 @ 15:55  metFORMIN 500 milliGRAM(s) Oral two times a day with meals, 03-20-24 @ 14:02  multivitamin 1 Tablet(s) Oral daily, 03-25-24 @ 11:54  polyethylene glycol 3350 17 Gram(s) Oral two times a day, 03-18-24 @ 13:04  potassium chloride   Powder 20 milliEquivalent(s) Oral daily, 03-07-24 @ 10:50  senna 2 Tablet(s) Oral at bedtime, 03-04-24 @ 15:54  sodium chloride 0.65% Nasal 1 Spray(s) Both Nostrils three times a day, 03-08-24 @ 11:51    PRN MEDICATIONS  acetaminophen     Tablet .. 650 milliGRAM(s) Oral every 6 hours, 03-04-24 @ 15:54 PRN  bisacodyl 5 milliGRAM(s) Oral every 12 hours, 03-22-24 @ 10:55 PRN  dextrose Oral Gel 15 Gram(s) Oral once, 03-18-24 @ 16:38 PRN  melatonin 3 milliGRAM(s) Oral at bedtime, 03-04-24 @ 15:55 PRN    Diet, Soft and Bite Sized:   Supplement Feeding Modality:  Oral  Glucerna Shake Cans or Servings Per Day:  1       Frequency:  Daily (03-25-24 @ 11:22) [Active]          Vital Signs Last 24 Hrs  T(C): 36.6 (25 Mar 2024 08:30), Max: 36.6 (24 Mar 2024 19:53)  T(F): 97.8 (25 Mar 2024 08:30), Max: 97.9 (24 Mar 2024 19:53)  HR: 80 (25 Mar 2024 08:30) (77 - 92)  BP: 105/71 (25 Mar 2024 08:30) (105/71 - 156/77)  RR: 16 (25 Mar 2024 08:30) (15 - 16)  SpO2: 100% (25 Mar 2024 08:30) (99% - 100%)    Parameters below as of 25 Mar 2024 08:30  Patient On (Oxygen Delivery Method): room air      I&Os:    LABS:                        9.8    10.78 )-----------( 316      ( 25 Mar 2024 08:08 )             30.2     WBC trend: 10.78 <--  Hgb: 9.8 [03-25-24 @ 08:08]<--, 10.4 [03-21-24 @ 06:46]<--    03-25    133<L>  |  96  |  16  ----------------------------<  125<H>  3.9   |  24  |  0.64    Ca    9.3      25 Mar 2024 08:08    TPro  6.8  /  Alb  3.5  /  TBili  0.6  /  DBili  x   /  AST  23  /  ALT  27  /  AlkPhos  63  03-25    Creatinine trend: 0.64<--, 0.64<--, 0.89<--, 0.72<--, 0.77<--, 0.63<--, 0.76<--  SODIUM TREND: Sodium 133 [03-25 @ 08:08]<--, Sodium 136 [03-21 @ 06:46]<--      POC Glucose: 159 [03-25-24 @ 11:44]<--, 148 [03-25-24 @ 08:29]<--, 106 [03-24-24 @ 21:29]<--, 106 [03-24-24 @ 16:37]<--        Urinalysis Basic - ( 25 Mar 2024 08:08 )    Color: x / Appearance: x / SG: x / pH: x  Gluc: 125 mg/dL / Ketone: x  / Bili: x / Urobili: x   Blood: x / Protein: x / Nitrite: x   Leuk Esterase: x / RBC: x / WBC x   Sq Epi: x / Non Sq Epi: x / Bacteria: x                                              -------------------------------------------------      PHYSICAL EXAM:  General: NAD, Alert responsive  ENT: MMM  Neck: Supple, No JVD  Lungs: Clear to auscultation bilaterally  Cardio: RRR, S1/S2, No murmurs  Abdomen: Soft, Nontender, Nondistended; Bowel sounds present  Extremities: No calf tenderness, No pitting edema  Neuro: expressive aphasia no new deficits

## 2024-03-25 NOTE — PROGRESS NOTE ADULT - COMMENTS
Patient seen with partner at bedside. Has difficulty tolerating right resting hand splint, was not wearing comfy splint on right elbow but spasticity per team and caregiver has remained about the same. We discussed possibility of antispasticity medications, including baclofen, robaxin, and botox as well as indications and potential SE, and to consider use if spasticity produces pain or interference with ROm/funciton    She is scheduled for transfer to [facility] today, no acute issues over weekend. Patient and caregiver aware and agreeable, I also provided my contact information and we spoke about post [facility] rehab and follow up as well

## 2024-03-25 NOTE — PROGRESS NOTE ADULT - GASTROINTESTINAL
soft/nontender
normal/soft/nontender/nondistended/normal active bowel sounds
soft/nontender/nondistended/no guarding
soft/nontender/nondistended
normal/soft/nontender/nondistended/normal active bowel sounds
soft/nontender/nondistended/no guarding
normal/soft/nontender/nondistended/normal active bowel sounds
normal/soft/nontender/nondistended/normal active bowel sounds
soft/nontender/nondistended/no guarding

## 2024-03-25 NOTE — PROGRESS NOTE ADULT - REASON FOR ADMISSION
L MCA CVA with expressive aphasia, right hemiparesis, and right facial droop

## 2024-03-25 NOTE — PROGRESS NOTE ADULT - CONSTITUTIONAL COMMENTS
alert, pleasant, Cooperative with exam, no agitation. Continues to have receptive language deficits and apraxia right which impairs command following
initiates "thank you" when discussing medication management for UTI/constipation
alert +expressive and receptive aphasia, continues minimal output and some perseveration NAd Afebrile
alert, smiling, pleasant but easily frustrated and periods of impulsivity
improved command following and improving apraxia noted today
alert, sustained eye opening. Improving receptive language/command following and some improved apraxia RUE +severe expressive deficits
alert, pleasant. Improved simple command following today, but still has more difficulty with right than left side. Appears comfortable, smiling, NAD
Does initiate "thank you" when discussing medication management UTI
alert, smiling NAD Afebrile

## 2024-03-25 NOTE — PROGRESS NOTE ADULT - ASSESSMENT
62 YO female with PMH HTN, HLD, DM2, CVA (10/2023), who presented to [facility] ED on 3/1/24 with right sided facial, right arm and leg weakness, aphasia and episodes of confusion as well as a fall. MRI brain demonstrated multiple small acute infarctions in the left MCA distribution     #Left MCA CVA with expressive aphasia, right hemiparesis, and right facial droop  - MRI 3/1: Multiple foci of diffusion restriction scattered throughout the left MCA vascular distribution, compatible with acute infarctions. Chronic lacunar infarctions in the right centrum semiovale/corona radiata and left hemipons. Encephalomalacia/gliotic change in the anterior right temporal lobe. Foci of susceptibility artifact in the right basal ganglia, likely sequela of prior micro-hemorrhages.  - ASA & plavix for 3 months, Discontinue ASA after 3 months (6/1/24)  - High dose statin  - Continue Aricept 5 mg Q8AM 3/9  - Continue Comprehensive Rehab Program: PT/OT/ST, 3hours daily and 5 days weekly  - recreation therapy  - Precautions: fall, aspiration, cardiac, DM, AMS    # spasticity  - right elbow comfy splint to improve extension. Patient does not tolerate right resting hand splint, will request OT to re-eval fit  - will benefit from AFO for right ankle DF assist and knee stabilization on dc  - continue to monitor RUE spasticity. Patient's partner defers start of antispasticity medication at this time due to concern of sedation    # dysphonia  - ENT 3/7: eval VC, difficulty tolerating laryngoscopy through nose, (+)dried nasal crusting, tolerated laryngoscopy through mouth, vocal cords mobile bilaterally, no pooling, no lesions  - Nasal saline spray TID    # HTN  - Amlodipine 10 mg daily  - Lisinopril 20mg daily  - BP  (121/71 - 156/77) 3/25    # HLD  - Lipitor 80mg daily    # Diabetes Mellitus Type 2 without complications  - A1c 5.5 on 3/2/24  - management as per hospitalist  - metformin 500 mg BID 3/20  - ISS    # leukocytosis and dysuria  - UA 3/10, 3/12 negative/equivocal  - Repeat UA (3/13) now with moderate LE and positive nitrite   - Urine cx (3/13): normal urogenital chelsi  - bladder scan with no signs of retention  - WBC 13.5 3/11--> 10.78 3/25  - s/p Macrobid 100 mg BID (3/13 PM - 3/16 AM). per hospitalist, as patient symptomatic with leukocytosis but urine cx negative, treat for 3 days  - continue to monitor    # oral thrush  - not improved with nystatin suspension (3/5 - 3/15) --> d/c  - diflucan 200 mg x1 (3/15),. On 100 mg QD (3/16-3/17) with worsening thrush, increased back to 200 mg QD on 3/18  - continue oral care    # hypokalemia (resolved)  - KCl 20 meq daily re-started 3/7 (home medication)  - K+ 3/25 pending    # hypomagnesemia (resolved)  - Mg oxide supplemented   - Mg 1.5 (3/5) -->2.0 (3/18)    # Pain management  - Tylenol PRN  - gabapentin 200 mg QHS 3/4     # Bowel Regimen/Constipation  - Senna QHS  - Miralax QD --> BID (3/18)  - dulcolax 5 mg Q12H PRN   - encouraged oral hydration    # Sleep:   - Melatonin 3mg nightly PRN    # FEN   - soft and bite sized (3/19)    # DVT ppx  - Bilateral LE duplex (3/8) NEGATIVE DVT  - Lovenox    # Case discussed in IDT rounds 3/20 (follow up):  - BM CG,  transfers Terrell, toilet transfer Terrell stand-pivot, tub transfer min-modA,gait: Amb 70 ft, using RW with R platform, max a x1 at R LE (PT on rolling stool) to advance and clear the foot, mod A x1 to control RW and shift her weight, receptive/expressive language deficits  - adjusted target: dc ERICH 3/26     # LABS  CBC CMP 3/25    #GOC  CODE STATUS: FULL CODE      Outpatient Follow-up (Specialty/Name of physician):    Jacobo Miramontes  Physical/Rehab Medicine  [address] [place] [id number]  Phone: [phone]  Fax: [phone]  Follow Up Time:       62 YO female with PMH HTN, HLD, DM2, CVA (10/2023), who presented to [facility] ED on 3/1/24 with right sided facial, right arm and leg weakness, aphasia and episodes of confusion as well as a fall. MRI brain demonstrated multiple small acute infarctions in the left MCA distribution     #Left MCA CVA with expressive aphasia, right hemiparesis, and right facial droop  - MRI 3/1: Multiple foci of diffusion restriction scattered throughout the left MCA vascular distribution, compatible with acute infarctions. Chronic lacunar infarctions in the right centrum semiovale/corona radiata and left hemipons. Encephalomalacia/gliotic change in the anterior right temporal lobe. Foci of susceptibility artifact in the right basal ganglia, likely sequela of prior micro-hemorrhages.  - ASA & plavix for 3 months, Discontinue ASA after 3 months (6/1/24)  - High dose statin  - Continue Aricept 5 mg Q8AM 3/9  - For transfer to [facility] for continued PT OT SLP today. Patient and partner aware and agreeable. Medical follow up and DME eval/Rx at [facility] as well as options for eventual outpatient therapy after [facility] discussed    # spasticity  - right elbow comfy splint to improve extension. Patient does not tolerate right resting hand splint, will request OT to re-eval fit  - will benefit from AFO for right ankle DF assist and knee stabilization on dc  - continue to monitor RUE spasticity. Patient's partner defers start of antispasticity medication at this time due to concern of sedation, however options for management and indications reviewed today    # dysphonia  - ENT 3/7: eval VC, difficulty tolerating laryngoscopy through nose, (+)dried nasal crusting, tolerated laryngoscopy through mouth, vocal cords mobile bilaterally, no pooling, no lesions  - Nasal saline spray TID    # HTN  - Amlodipine 10 mg daily  - Lisinopril 20mg daily  - BP  (121/71 - 156/77) 3/25  - PCP f/u on dc    # HLD  - Lipitor 80mg daily    # Diabetes Mellitus Type 2 without complications  - A1c 5.5 on 3/2/24  - management as per hospitalist  - metformin 500 mg BID 3/20  - PCP f/u on dc    # leukocytosis and dysuria  - UA 3/10, 3/12 negative/equivocal  - Repeat UA (3/13) now with moderate LE and positive nitrite   - Urine cx (3/13): normal urogenital chelsi  - bladder scan with no signs of retention  - WBC 13.5 3/11--> 10.78 3/25  - s/p Macrobid 100 mg BID x 3 days    # oral thrush  - not improved with nystatin suspension (3/5 - 3/15) --> d/c  - diflucan 200 mg x1 (3/15),. On 100 mg QD (3/16-3/17) with worsening thrush, increased back to 200 mg QD on 3/18  - continue oral care    # hypokalemia (resolved)  - KCl 20 meq daily re-started 3/7 (home medication)  - K+ 3.9  3/25     # hyponatremia  - Na 133 3/25  - continue to monitor at [facility]  - no salt restriction in diet    # hypomagnesemia (resolved)  - Mg oxide supplemented   - Mg 1.5 (3/5) -->2.0 (3/18)    # Pain management  - Tylenol PRN  - gabapentin 200 mg QHS 3/4     # Bowel Regimen/Constipation  - Senna QHS  - Miralax QD --> BID (3/18)  - dulcolax 5 mg Q12H PRN   - encouraged oral hydration    # Sleep:   - Melatonin 3mg nightly PRN    # FEN   - soft and bite sized (3/19)    # DVT ppx  - Bilateral LE duplex (3/8) NEGATIVE DVT  - Lovenox    # Case discussed in IDT rounds 3/20 (follow up):  - BM CG,  transfers Terrell, toilet transfer Terrell stand-pivot, tub transfer min-modA,gait: Amb 70 ft, using RW with R platform, max a x1 at R LE (PT on rolling stool) to advance and clear the foot, mod A x1 to control RW and shift her weight, receptive/expressive language deficits  - Patient is medically cleared to dc to [facility] today. Patient and family agreeable, medical follow up and DME assessment, outpatient following [facility] reviewed, along with options for spasticity management. My contact information was provided again as well for any questions. Time spent for education, evaluation, and coordination dc 45 mikki        #C  CODE STATUS: FULL CODE      Outpatient Follow-up (Specialty/Name of physician):    Jacobo Miramontes  Physical/Rehab Medicine  [address] [place] [id number]  Phone: [phone]  Fax: [phone]  Follow Up Time:

## 2024-03-25 NOTE — PROGRESS NOTE ADULT - MOTOR
able to transfer sit-stand on parallel bars, stands with good balance, able to bring left leg forward onto block while standing on right leg  calves soft, nontender
right elbow: MAS flexor tone 2 but improved from yesterday  MAS MCP and IP 1  no hand swelling no pain with PROM RUE  right finger flexion 2-/5  right HF at least 3/5 quad at least 4/5 ankle PF 4/5  calves soft no TTP  left UE and LE motor 5/5
right UE: finger flexion 2/5, improved initiation and range from yesterday  right elbow flexion 2/5 extension 2-/5  right elbow flexor tone 1+ after ranging but 2 prior  finger flexor tone 1 wrist 1 MAS  right HF 3/5 quad 4/5 ankle PF 4/5 improved motor planning today
right elbow flexor MAS 2/5 elbow extensor improved MAS 1  MCP flexors right hand MAS 1+ IP 1_+  no calf swelling improved hamstring tightness, no grimacing
abducts right shoulder and extends right knee anti-gravity, otherwise difficulty with following commands for accurate motor testing
right shoulder 2+/5 elbow flexion 2/5  calves soft no TTP
right shoulder abduction 3/5 elbow flexion 2/5 extension 2-/5 finger flexion 2-/5  significantly improved speed of execution movement and command following on right  calves soft no TTP bilaterally
bilateral calves soft no TTP

## 2024-03-25 NOTE — PROGRESS NOTE ADULT - ASSESSMENT
63F with HTN, HLD, DM2, prior CVA, now in acute rehab after left ischemic MCA stroke-     #Ischemic MCA stroke  -c/w ASA and Plavix  -c/w statin  -c/w Aricept    #Essential HTN  - On amlodipine and lisinopril    #Oral candidiasis  - fluconazole     # uti  - s/p Macrobid 100 mg BID (3/13 PM - 3/16 AM).  - culture remained negative    #DM2 with hyperglycemia  - A1C with Estimated Average Glucose Result: 5.5 % (03-02-24 @ 06:25)  - home metformin 500mg BID restarted - BG noted    # DVT ppx  - Bilateral LE duplex (3/8) NEGATIVE DVT  - Lovenox      plan for d/c today

## 2024-03-25 NOTE — PROGRESS NOTE ADULT - CARDIOVASCULAR
regular rate and rhythm
normal/regular rate and rhythm/S1 S2 present/no gallops/no rub/no murmur
regular rate and rhythm
regular rate and rhythm/S1 S2 present
normal/regular rate and rhythm/S1 S2 present/no gallops/no rub/no murmur

## 2024-05-22 ENCOUNTER — INPATIENT (INPATIENT)
Facility: HOSPITAL | Age: 64
LOS: 1 days | Discharge: ANOTHER HOSPITAL | DRG: 66 | End: 2024-05-24
Attending: INTERNAL MEDICINE | Admitting: INTERNAL MEDICINE
Payer: MEDICAID

## 2024-05-22 VITALS
OXYGEN SATURATION: 97 % | TEMPERATURE: 98 F | WEIGHT: 94.14 LBS | SYSTOLIC BLOOD PRESSURE: 122 MMHG | HEART RATE: 72 BPM | DIASTOLIC BLOOD PRESSURE: 65 MMHG | RESPIRATION RATE: 18 BRPM

## 2024-05-22 DIAGNOSIS — I63.9 CEREBRAL INFARCTION, UNSPECIFIED: ICD-10-CM

## 2024-05-22 LAB
ALBUMIN SERPL ELPH-MCNC: 3.4 G/DL — SIGNIFICANT CHANGE UP (ref 3.3–5)
ALP SERPL-CCNC: 74 U/L — SIGNIFICANT CHANGE UP (ref 40–120)
ALT FLD-CCNC: 23 U/L — SIGNIFICANT CHANGE UP (ref 10–45)
ANION GAP SERPL CALC-SCNC: 10 MMOL/L — SIGNIFICANT CHANGE UP (ref 5–17)
APTT BLD: 30.7 SEC — SIGNIFICANT CHANGE UP (ref 24.5–35.6)
AST SERPL-CCNC: 13 U/L — SIGNIFICANT CHANGE UP (ref 10–40)
BASOPHILS # BLD AUTO: 0.06 K/UL — SIGNIFICANT CHANGE UP (ref 0–0.2)
BASOPHILS NFR BLD AUTO: 0.6 % — SIGNIFICANT CHANGE UP (ref 0–2)
BILIRUB SERPL-MCNC: 0.3 MG/DL — SIGNIFICANT CHANGE UP (ref 0.2–1.2)
BUN SERPL-MCNC: 36 MG/DL — HIGH (ref 7–23)
CALCIUM SERPL-MCNC: 9.4 MG/DL — SIGNIFICANT CHANGE UP (ref 8.4–10.5)
CHLORIDE SERPL-SCNC: 104 MMOL/L — SIGNIFICANT CHANGE UP (ref 96–108)
CO2 SERPL-SCNC: 27 MMOL/L — SIGNIFICANT CHANGE UP (ref 22–31)
CREAT SERPL-MCNC: 0.8 MG/DL — SIGNIFICANT CHANGE UP (ref 0.5–1.3)
EGFR: 82 ML/MIN/1.73M2 — SIGNIFICANT CHANGE UP
EOSINOPHIL # BLD AUTO: 0.13 K/UL — SIGNIFICANT CHANGE UP (ref 0–0.5)
EOSINOPHIL NFR BLD AUTO: 1.2 % — SIGNIFICANT CHANGE UP (ref 0–6)
GLUCOSE SERPL-MCNC: 172 MG/DL — HIGH (ref 70–99)
HCT VFR BLD CALC: 32.6 % — LOW (ref 34.5–45)
HGB BLD-MCNC: 10.4 G/DL — LOW (ref 11.5–15.5)
IMM GRANULOCYTES NFR BLD AUTO: 0.3 % — SIGNIFICANT CHANGE UP (ref 0–0.9)
INR BLD: 0.98 RATIO — SIGNIFICANT CHANGE UP (ref 0.85–1.18)
LYMPHOCYTES # BLD AUTO: 2.69 K/UL — SIGNIFICANT CHANGE UP (ref 1–3.3)
LYMPHOCYTES # BLD AUTO: 25 % — SIGNIFICANT CHANGE UP (ref 13–44)
MCHC RBC-ENTMCNC: 29.6 PG — SIGNIFICANT CHANGE UP (ref 27–34)
MCHC RBC-ENTMCNC: 31.9 GM/DL — LOW (ref 32–36)
MCV RBC AUTO: 92.9 FL — SIGNIFICANT CHANGE UP (ref 80–100)
MONOCYTES # BLD AUTO: 0.92 K/UL — HIGH (ref 0–0.9)
MONOCYTES NFR BLD AUTO: 8.5 % — SIGNIFICANT CHANGE UP (ref 2–14)
NEUTROPHILS # BLD AUTO: 6.95 K/UL — SIGNIFICANT CHANGE UP (ref 1.8–7.4)
NEUTROPHILS NFR BLD AUTO: 64.4 % — SIGNIFICANT CHANGE UP (ref 43–77)
NRBC # BLD: 0 /100 WBCS — SIGNIFICANT CHANGE UP (ref 0–0)
PLATELET # BLD AUTO: 258 K/UL — SIGNIFICANT CHANGE UP (ref 150–400)
POTASSIUM SERPL-MCNC: 4.1 MMOL/L — SIGNIFICANT CHANGE UP (ref 3.5–5.3)
POTASSIUM SERPL-SCNC: 4.1 MMOL/L — SIGNIFICANT CHANGE UP (ref 3.5–5.3)
PROT SERPL-MCNC: 6.9 G/DL — SIGNIFICANT CHANGE UP (ref 6–8.3)
PROTHROM AB SERPL-ACNC: 11.2 SEC — SIGNIFICANT CHANGE UP (ref 9.5–13)
RBC # BLD: 3.51 M/UL — LOW (ref 3.8–5.2)
RBC # FLD: 12.5 % — SIGNIFICANT CHANGE UP (ref 10.3–14.5)
SODIUM SERPL-SCNC: 141 MMOL/L — SIGNIFICANT CHANGE UP (ref 135–145)
TROPONIN I, HIGH SENSITIVITY RESULT: 5.1 NG/L — SIGNIFICANT CHANGE UP
WBC # BLD: 10.78 K/UL — HIGH (ref 3.8–10.5)
WBC # FLD AUTO: 10.78 K/UL — HIGH (ref 3.8–10.5)

## 2024-05-22 PROCEDURE — 93010 ELECTROCARDIOGRAM REPORT: CPT

## 2024-05-22 PROCEDURE — 0042T: CPT | Mod: MC

## 2024-05-22 PROCEDURE — 70450 CT HEAD/BRAIN W/O DYE: CPT | Mod: 26,MC,59

## 2024-05-22 PROCEDURE — 70496 CT ANGIOGRAPHY HEAD: CPT | Mod: 26,MC

## 2024-05-22 PROCEDURE — 99223 1ST HOSP IP/OBS HIGH 75: CPT | Mod: GC

## 2024-05-22 PROCEDURE — 70498 CT ANGIOGRAPHY NECK: CPT | Mod: 26,MC

## 2024-05-22 PROCEDURE — 99285 EMERGENCY DEPT VISIT HI MDM: CPT

## 2024-05-22 NOTE — ED ADULT TRIAGE NOTE - CHIEF COMPLAINT QUOTE
Pt was sent in by Neurologist after receiving MRI results she had done today of an acute infarct. Pt c/o right leg weakness and drooling started at 4pm today

## 2024-05-22 NOTE — ED ADULT NURSE NOTE - NSFALLHARMRISKINTERV_ED_ALL_ED

## 2024-05-22 NOTE — ED ADULT NURSE NOTE - BEFAST SPEECH SLURRED
HPI:   Timoteo Sprague is a 3 y.o. male brought by mother for Cough (x 3-4 weeks )     HPI:  Had an illness a little more than 1 month ago. Seen here at that time. Essentially all the other symptoms are gone, but persistent cough - worse in the morning or when running around. It comes in waves through the day. Generally the cough is wet, sometimes less so. Hasn't really changed/improved at all, despite humidifier, cold remedies, vitamins. Pertinent negatives: no fever, no V/D  Eating and drinking well    No specific sick contact. No breathing treatments or problems before. Histories:     Medical/Surgical:  Patient Active Problem List    Diagnosis Date Noted    Lapsed immunization schedule status 2017    Missed vaccination due to parent refusal 2017      -  has a past surgical history that includes hx circumcision. Current Outpatient Medications on File Prior to Visit   Medication Sig Dispense Refill    Lactobacillus rhamnosus GG (CULTURELLE KIDS PROBIOTICS) 5 billion cell pwpk Take 1 Packet by mouth daily. (Patient not taking: Reported on 2021) 30 Packet 0    selenium sulfide 2.25 % sham MASSAGE SHAMPOO ONTO WET SCALP THEN RINSE THOROUGHLY AT LEAST TWICE WEEKLY (Patient not taking: Reported on 2021) 180 mL 0     No current facility-administered medications on file prior to visit. Allergies:  No Known Allergies  Objective:     Vitals:    21 1101   BP: 100/52   Pulse: 91   Temp: 98.1 °F (36.7 °C)   SpO2: 99%   Weight: (!) 59 lb 6 oz (26.9 kg)   Height: (!) 3' 10\" (1.168 m)      >99 %ile (Z= 2.53) based on CDC (Boys, 2-20 Years) BMI-for-age based on BMI available as of 11/3/2021. Blood pressure percentiles are 68 % systolic and 41 % diastolic based on the 0237 AAP Clinical Practice Guideline. Blood pressure percentile targets: 90: 108/67, 95: 111/70, 95 + 12 mmH/82. This reading is in the normal blood pressure range.    Physical Exam  Constitutional:       General: He is active. He is not in acute distress. Appearance: He is not toxic-appearing. HENT:      Right Ear: Tympanic membrane normal.      Left Ear: Tympanic membrane normal.      Nose: No congestion or rhinorrhea. Mouth/Throat:      Mouth: Mucous membranes are moist.      Pharynx: Oropharynx is clear. Eyes:      Conjunctiva/sclera: Conjunctivae normal.   Cardiovascular:      Rate and Rhythm: Normal rate and regular rhythm. Heart sounds: S1 normal and S2 normal. No murmur heard. Pulmonary:      Effort: Pulmonary effort is normal.      Breath sounds: Normal breath sounds. No decreased air movement. No wheezing or rales. Comments: Infrequent mild wet cough during the visit  Abdominal:      General: There is no distension. Palpations: Abdomen is soft. Tenderness: There is no abdominal tenderness. Musculoskeletal:      Cervical back: Neck supple. Skin:     General: Skin is warm. Capillary Refill: Capillary refill takes less than 2 seconds. Neurological:      Mental Status: He is alert. No results found for any visits on 11/03/21. Assessment/Plan:     Acute Diagnoses Addressed Today     Protracted bacterial bronchitis (HCC)    -  Primary        Relevant Medications        amoxicillin-clavulanate (AUGMENTIN) 400-57 mg/5 mL suspension         Follow-up and Dispositions    · Return if symptoms worsen or fail to improve, for and for Well Check yearly around February.          Billing:     Level of service for this encounter was determined based on:  - Medical Decision Making Yes

## 2024-05-22 NOTE — ED PROVIDER NOTE - OBJECTIVE STATEMENT
62 y/o F with h/o Left MCA stroke in february 24, with residual weakness on right side, ref by her radiologist after MRI of brain done today c/o new stroke. As per family of pt she was complaining of right leg weakness and pain for past 2 days, she was already scheduled for 6 month f/u MRI , which she got today and found that she has new acute CVA in left MCA territory.

## 2024-05-22 NOTE — ED ADULT NURSE NOTE - OBJECTIVE STATEMENT
Pt aaox3, came in to ED, pt was sent in by Neurologist after receiving MRI results she had done today of an acute infarct. Pt c/o right leg weakness and drooling started at 4pm today. Pt denies headache or dizziness.

## 2024-05-22 NOTE — ED PROVIDER NOTE - CLINICAL SUMMARY MEDICAL DECISION MAKING FREE TEXT BOX
62 y/o F with h/o Left MCA stroke in february 24, with residual weakness on right side, ref by her radiologist after MRI of brain done today c/o new stroke. As per family of pt she was complaining of right leg weakness and pain for past 2 days, she was already scheduled for 6 month f/u MRI , which she got today and found that she has new acute CVA in left MCA territory.  Stroke protocol was ordered, case was discussed with stroke neurology team, no intervention indicated, just observe

## 2024-05-23 DIAGNOSIS — M79.671 PAIN IN RIGHT FOOT: ICD-10-CM

## 2024-05-23 DIAGNOSIS — I63.512 CEREBRAL INFARCTION DUE TO UNSPECIFIED OCCLUSION OR STENOSIS OF LEFT MIDDLE CEREBRAL ARTERY: ICD-10-CM

## 2024-05-23 DIAGNOSIS — Z29.9 ENCOUNTER FOR PROPHYLACTIC MEASURES, UNSPECIFIED: ICD-10-CM

## 2024-05-23 DIAGNOSIS — I10 ESSENTIAL (PRIMARY) HYPERTENSION: ICD-10-CM

## 2024-05-23 DIAGNOSIS — E11.9 TYPE 2 DIABETES MELLITUS WITHOUT COMPLICATIONS: ICD-10-CM

## 2024-05-23 DIAGNOSIS — Z71.89 OTHER SPECIFIED COUNSELING: ICD-10-CM

## 2024-05-23 LAB
A1C WITH ESTIMATED AVERAGE GLUCOSE RESULT: 5.8 % — HIGH (ref 4–5.6)
ANION GAP SERPL CALC-SCNC: 6 MMOL/L — SIGNIFICANT CHANGE UP (ref 5–17)
BLD GP AB SCN SERPL QL: SIGNIFICANT CHANGE UP
BUN SERPL-MCNC: 24 MG/DL — HIGH (ref 7–23)
CALCIUM SERPL-MCNC: 9 MG/DL — SIGNIFICANT CHANGE UP (ref 8.4–10.5)
CHLORIDE SERPL-SCNC: 107 MMOL/L — SIGNIFICANT CHANGE UP (ref 96–108)
CHOLEST SERPL-MCNC: 160 MG/DL — SIGNIFICANT CHANGE UP
CO2 SERPL-SCNC: 32 MMOL/L — HIGH (ref 22–31)
CREAT SERPL-MCNC: 0.7 MG/DL — SIGNIFICANT CHANGE UP (ref 0.5–1.3)
EGFR: 97 ML/MIN/1.73M2 — SIGNIFICANT CHANGE UP
ESTIMATED AVERAGE GLUCOSE: 120 MG/DL — HIGH (ref 68–114)
GLUCOSE BLDC GLUCOMTR-MCNC: 124 MG/DL — HIGH (ref 70–99)
GLUCOSE BLDC GLUCOMTR-MCNC: 177 MG/DL — HIGH (ref 70–99)
GLUCOSE BLDC GLUCOMTR-MCNC: 182 MG/DL — HIGH (ref 70–99)
GLUCOSE BLDC GLUCOMTR-MCNC: 99 MG/DL — SIGNIFICANT CHANGE UP (ref 70–99)
GLUCOSE SERPL-MCNC: 93 MG/DL — SIGNIFICANT CHANGE UP (ref 70–99)
HCT VFR BLD CALC: 32.6 % — LOW (ref 34.5–45)
HDLC SERPL-MCNC: 64 MG/DL — SIGNIFICANT CHANGE UP
HGB BLD-MCNC: 10.7 G/DL — LOW (ref 11.5–15.5)
LIPID PNL WITH DIRECT LDL SERPL: 84 MG/DL — SIGNIFICANT CHANGE UP
MCHC RBC-ENTMCNC: 30 PG — SIGNIFICANT CHANGE UP (ref 27–34)
MCHC RBC-ENTMCNC: 32.8 GM/DL — SIGNIFICANT CHANGE UP (ref 32–36)
MCV RBC AUTO: 91.3 FL — SIGNIFICANT CHANGE UP (ref 80–100)
NON HDL CHOLESTEROL: 96 MG/DL — SIGNIFICANT CHANGE UP
NRBC # BLD: 0 /100 WBCS — SIGNIFICANT CHANGE UP (ref 0–0)
PLATELET # BLD AUTO: 247 K/UL — SIGNIFICANT CHANGE UP (ref 150–400)
POTASSIUM SERPL-MCNC: 3.9 MMOL/L — SIGNIFICANT CHANGE UP (ref 3.5–5.3)
POTASSIUM SERPL-SCNC: 3.9 MMOL/L — SIGNIFICANT CHANGE UP (ref 3.5–5.3)
RBC # BLD: 3.57 M/UL — LOW (ref 3.8–5.2)
RBC # FLD: 12.4 % — SIGNIFICANT CHANGE UP (ref 10.3–14.5)
SODIUM SERPL-SCNC: 145 MMOL/L — SIGNIFICANT CHANGE UP (ref 135–145)
TRIGL SERPL-MCNC: 63 MG/DL — SIGNIFICANT CHANGE UP
WBC # BLD: 10.18 K/UL — SIGNIFICANT CHANGE UP (ref 3.8–10.5)
WBC # FLD AUTO: 10.18 K/UL — SIGNIFICANT CHANGE UP (ref 3.8–10.5)

## 2024-05-23 PROCEDURE — 71045 X-RAY EXAM CHEST 1 VIEW: CPT | Mod: 26

## 2024-05-23 PROCEDURE — 70551 MRI BRAIN STEM W/O DYE: CPT | Mod: 26

## 2024-05-23 PROCEDURE — 99223 1ST HOSP IP/OBS HIGH 75: CPT

## 2024-05-23 PROCEDURE — 73110 X-RAY EXAM OF WRIST: CPT | Mod: 26,RT

## 2024-05-23 PROCEDURE — 99222 1ST HOSP IP/OBS MODERATE 55: CPT | Mod: GC

## 2024-05-23 PROCEDURE — 73630 X-RAY EXAM OF FOOT: CPT | Mod: 26,RT

## 2024-05-23 PROCEDURE — 93306 TTE W/DOPPLER COMPLETE: CPT | Mod: 26

## 2024-05-23 RX ORDER — DEXTROSE 50 % IN WATER 50 %
15 SYRINGE (ML) INTRAVENOUS ONCE
Refills: 0 | Status: DISCONTINUED | OUTPATIENT
Start: 2024-05-23 | End: 2024-05-24

## 2024-05-23 RX ORDER — CLOPIDOGREL BISULFATE 75 MG/1
75 TABLET, FILM COATED ORAL DAILY
Refills: 0 | Status: DISCONTINUED | OUTPATIENT
Start: 2024-05-23 | End: 2024-05-24

## 2024-05-23 RX ORDER — ATORVASTATIN CALCIUM 80 MG/1
80 TABLET, FILM COATED ORAL AT BEDTIME
Refills: 0 | Status: DISCONTINUED | OUTPATIENT
Start: 2024-05-23 | End: 2024-05-24

## 2024-05-23 RX ORDER — DEXTROSE 50 % IN WATER 50 %
12.5 SYRINGE (ML) INTRAVENOUS ONCE
Refills: 0 | Status: DISCONTINUED | OUTPATIENT
Start: 2024-05-23 | End: 2024-05-24

## 2024-05-23 RX ORDER — ENOXAPARIN SODIUM 100 MG/ML
40 INJECTION SUBCUTANEOUS EVERY 24 HOURS
Refills: 0 | Status: DISCONTINUED | OUTPATIENT
Start: 2024-05-23 | End: 2024-05-24

## 2024-05-23 RX ORDER — DONEPEZIL HYDROCHLORIDE 10 MG/1
5 TABLET, FILM COATED ORAL AT BEDTIME
Refills: 0 | Status: DISCONTINUED | OUTPATIENT
Start: 2024-05-23 | End: 2024-05-24

## 2024-05-23 RX ORDER — DEXTROSE 50 % IN WATER 50 %
25 SYRINGE (ML) INTRAVENOUS ONCE
Refills: 0 | Status: DISCONTINUED | OUTPATIENT
Start: 2024-05-23 | End: 2024-05-24

## 2024-05-23 RX ORDER — INSULIN LISPRO 100/ML
VIAL (ML) SUBCUTANEOUS AT BEDTIME
Refills: 0 | Status: DISCONTINUED | OUTPATIENT
Start: 2024-05-23 | End: 2024-05-24

## 2024-05-23 RX ORDER — ASPIRIN/CALCIUM CARB/MAGNESIUM 324 MG
81 TABLET ORAL DAILY
Refills: 0 | Status: DISCONTINUED | OUTPATIENT
Start: 2024-05-23 | End: 2024-05-24

## 2024-05-23 RX ORDER — SODIUM CHLORIDE 9 MG/ML
1000 INJECTION INTRAMUSCULAR; INTRAVENOUS; SUBCUTANEOUS
Refills: 0 | Status: DISCONTINUED | OUTPATIENT
Start: 2024-05-23 | End: 2024-05-23

## 2024-05-23 RX ORDER — SENNA PLUS 8.6 MG/1
2 TABLET ORAL AT BEDTIME
Refills: 0 | Status: DISCONTINUED | OUTPATIENT
Start: 2024-05-23 | End: 2024-05-24

## 2024-05-23 RX ORDER — GLUCAGON INJECTION, SOLUTION 0.5 MG/.1ML
1 INJECTION, SOLUTION SUBCUTANEOUS ONCE
Refills: 0 | Status: DISCONTINUED | OUTPATIENT
Start: 2024-05-23 | End: 2024-05-24

## 2024-05-23 RX ORDER — LANOLIN ALCOHOL/MO/W.PET/CERES
3 CREAM (GRAM) TOPICAL AT BEDTIME
Refills: 0 | Status: DISCONTINUED | OUTPATIENT
Start: 2024-05-23 | End: 2024-05-24

## 2024-05-23 RX ORDER — PETROLATUM,WHITE
1 JELLY (GRAM) TOPICAL ONCE
Refills: 0 | Status: COMPLETED | OUTPATIENT
Start: 2024-05-23 | End: 2024-05-23

## 2024-05-23 RX ORDER — SODIUM CHLORIDE 9 MG/ML
1000 INJECTION, SOLUTION INTRAVENOUS
Refills: 0 | Status: DISCONTINUED | OUTPATIENT
Start: 2024-05-23 | End: 2024-05-24

## 2024-05-23 RX ORDER — DEXTROSE 10 % IN WATER 10 %
125 INTRAVENOUS SOLUTION INTRAVENOUS ONCE
Refills: 0 | Status: DISCONTINUED | OUTPATIENT
Start: 2024-05-23 | End: 2024-05-24

## 2024-05-23 RX ORDER — INSULIN LISPRO 100/ML
VIAL (ML) SUBCUTANEOUS
Refills: 0 | Status: DISCONTINUED | OUTPATIENT
Start: 2024-05-23 | End: 2024-05-24

## 2024-05-23 RX ADMIN — Medication 81 MILLIGRAM(S): at 12:53

## 2024-05-23 RX ADMIN — Medication 1 TABLET(S): at 12:53

## 2024-05-23 RX ADMIN — Medication 1 APPLICATION(S): at 16:30

## 2024-05-23 RX ADMIN — DONEPEZIL HYDROCHLORIDE 5 MILLIGRAM(S): 10 TABLET, FILM COATED ORAL at 22:10

## 2024-05-23 RX ADMIN — CLOPIDOGREL BISULFATE 75 MILLIGRAM(S): 75 TABLET, FILM COATED ORAL at 12:53

## 2024-05-23 RX ADMIN — ATORVASTATIN CALCIUM 80 MILLIGRAM(S): 80 TABLET, FILM COATED ORAL at 22:02

## 2024-05-23 RX ADMIN — ENOXAPARIN SODIUM 40 MILLIGRAM(S): 100 INJECTION SUBCUTANEOUS at 06:48

## 2024-05-23 RX ADMIN — Medication 2: at 12:54

## 2024-05-23 NOTE — DIETITIAN INITIAL EVALUATION ADULT - PERTINENT LABORATORY DATA
05-23    145  |  107  |  24<H>  ----------------------------<  93  3.9   |  32<H>  |  0.70    Ca    9.0      23 May 2024 06:07    TPro  6.9  /  Alb  3.4  /  TBili  0.3  /  DBili  x   /  AST  13  /  ALT  23  /  AlkPhos  74  05-22  POCT Blood Glucose.: 124 mg/dL (05-23-24 @ 08:38)  A1C with Estimated Average Glucose Result: 5.5 % (03-02-24 @ 06:25)

## 2024-05-23 NOTE — H&P ADULT - NSHPPOADEEPVENOUSTHROMB_GEN_A_CORE
Pharmacist Discharge Medication Reconciliation    Encounter Diagnoses     ICD-10-CM ICD-9-CM   1. Acute renal failure, unspecified acute renal failure type ([facility]  N17.9 584.9   2. Severe hypotension  I95.9 458.9   3. Clonidine overdose, accidental or unintentional, initial encounter  T46.5X1A 972.6     E858.3       Allergies: Vicodin [hydrocodone-acetaminophen]    Discharge Medications:   Current Discharge Medication List        CONTINUE these medications which have NOT CHANGED    Details   tadalafiL (Cialis) 5 mg tablet Take 1 Tablet by mouth daily. Qty: 30 Tablet, Refills: 5    Associated Diagnoses: Erectile dysfunction, unspecified erectile dysfunction type      pregabalin (LYRICA) 100 mg capsule Take 1 Capsule by mouth two (2) times a day. Max Daily Amount: 200 mg. Qty: 60 Capsule, Refills: 5    Associated Diagnoses: Neuropathy of right lower extremity      albuterol (PROVENTIL HFA, VENTOLIN HFA, PROAIR HFA) 90 mcg/actuation inhaler Take 1 Puff by inhalation every four (4) hours as needed for Wheezing. Qty: 1 Inhaler, Refills: 5    Associated Diagnoses: Severe persistent asthma with acute exacerbation      aspirin delayed-release 81 mg tablet Take 1 Tab by mouth daily. Qty: 100 Tab, Refills: 3    Associated Diagnoses: Essential hypertension      fluticasone propionate (Flovent HFA) 220 mcg/actuation inhaler INHALE 1 OR 2 PUFFS 2 TIMES A DAY. Qty: 3 Inhaler, Refills: 0    Associated Diagnoses: Severe persistent asthma with acute exacerbation      fluticasone propionate (Flonase Allergy Relief) 50 mcg/actuation nasal spray 2 Sprays by Both Nostrils route daily. Qty: 1 Bottle, Refills: 5    Associated Diagnoses: Allergic rhinitis, unspecified seasonality, unspecified trigger      cyclobenzaprine (FLEXERIL) 10 mg tablet Take 1 Tab by mouth nightly.   Qty: 30 Tab, Refills: 0    Associated Diagnoses: Chronic left-sided low back pain without sciatica      cetirizine (ZyrTEC) 10 mg tablet Take 1 Tab by mouth daily.  Qty: 20 Tab, Refills: 0    Associated Diagnoses: Allergic rhinitis, unspecified seasonality, unspecified trigger      sildenafil citrate (Viagra) 100 mg tablet Take 1 Tab by mouth as needed for Erectile Dysfunction. Qty: 6 Tab, Refills: 5    Associated Diagnoses: Erectile dysfunction, unspecified erectile dysfunction type      albuterol (PROVENTIL VENTOLIN) 2.5 mg /3 mL (0.083 %) nebu 3 mL by Nebulization route every four (4) hours as needed for Wheezing. Qty: 90 Nebule, Refills: 0    Associated Diagnoses: Severe persistent asthma with acute exacerbation      guaiFENesin-dextromethorphan (TUSSI-ORGANIDIN DM)  mg/5 mL liqd Take 10 mL by mouth every four (4) hours as needed for Cough or Congestion. Qty: 236 mL, Refills: 0      Linzess 290 mcg cap capsule Take 1 Cap by mouth daily. Qty: 90 Cap, Refills: 3    Associated Diagnoses: Irritable bowel syndrome with constipation      montelukast (Singulair) 10 mg tablet Take 1 Tab by mouth daily. Qty: 90 Tab, Refills: 0    Associated Diagnoses: Severe persistent asthma with acute exacerbation; Allergic rhinitis, unspecified seasonality, unspecified trigger      pantoprazole (PROTONIX) 40 mg tablet Take 1 Tab by mouth daily. Qty: 90 Tab, Refills: 3    Associated Diagnoses: Gastroesophageal reflux disease without esophagitis      polyethylene glycol (MIRALAX) 17 gram packet Take 1 Packet by mouth daily.   Qty: 1 Each, Refills: 5    Associated Diagnoses: Chronic constipation      Nebulizer Accessories kit Nebulizer cup w/ tubing; use every 4 hrs prn wheezing  Qty: 1 Kit, Refills: 5    Associated Diagnoses: Wheezing; Chronic bronchitis, unspecified chronic bronchitis type (HCC)           STOP taking these medications       cloNIDine HCL (CATAPRES) 0.2 mg tablet Comments:   Reason for Stopping:         lisinopriL (PRINIVIL, ZESTRIL) 20 mg tablet Comments:   Reason for Stopping:         amLODIPine (NORVASC) 10 mg tablet Comments:   Reason for Stopping: atenoloL (TENORMIN) 50 mg tablet Comments:   Reason for Stopping:         hydroCHLOROthiazide (HYDRODIURIL) 25 mg tablet Comments:   Reason for Stopping:         phentermine (ADIPEX-P) 37.5 mg tablet Comments:   Reason for Stopping:         lisinopriL (PRINIVIL, ZESTRIL) 20 mg tablet Comments:   Reason for Stopping:               The patient's chart, MAR and AVS were reviewed by Joi Alvarado [facility].     Discharging Provider: Carmelina Miller MD    Thank you,     Joi Alvarado, [id number] [facility] no

## 2024-05-23 NOTE — CONSULT NOTE ADULT - SUBJECTIVE AND OBJECTIVE BOX
HPI:  64 y/o F with PMH of HTN, T2DM,  h/o Left MCA stroke in Oct 2023 with right facial droop and right sided hemiparesis, referred by her radiologist after MRI of brain done today showed new stroke. As per patient's partner Connie, pt was complaining of right leg weakness and pain for past 2 days. Of note, this was her 6 month f/u MRI , which she got today and found that she has new acute CVA in left MCA territory. pt was brought to the ED. As per Connie, patient's family history is unknown. per Connie, patient also erazo not have any heart rhythm issues. It is unclear if there is hypercoagualable disorders in patient's family.      CT HEAD:  No acute intracranial hemorrhage. Compared to 3/1/24 there is evolution of left sided infarcts seen on MRI at that time. Site of acute of infarct  on today's MRI is uncertain.  Chronic infarct sites right anterior temporal lobe and those in the frieda appear stable.  New groundglass opacity in the left upper lung. Correlate clinically for pneumonia.    Incidentally seen -New groundglass opacity in the left upper lung. Correlate clinically for pneumonia.      REVIEW OF SYSTEMS: No chest pain, shortness of breath, nausea, vomiting or diarhea.      PAST MEDICAL & SURGICAL HISTORY  HTN (hypertension)    DM (diabetes mellitus)    CVA (cerebrovascular accident)    No significant past surgical history        SOCIAL HISTORY  Smoking - Denied, EtOH - Denied, Drugs - Denied    FUNCTIONAL HISTORY:      pt lives w/sig other in apt, no stairs. pt is non ambulatory but able to transfer to w/c with assist. pt has commode and rw  CURRENT FUNCTIONAL STATUS:mod a       FAMILY HISTORY       RECENT LABS/IMAGING  CBC Full  -  ( 22 May 2024 20:48 )  WBC Count : 10.78 K/uL  RBC Count : 3.51 M/uL  Hemoglobin : 10.4 g/dL  Hematocrit : 32.6 %  Platelet Count - Automated : 258 K/uL  Mean Cell Volume : 92.9 fl  Mean Cell Hemoglobin : 29.6 pg  Mean Cell Hemoglobin Concentration : 31.9 gm/dL  Auto Neutrophil # : 6.95 K/uL  Auto Lymphocyte # : 2.69 K/uL  Auto Monocyte # : 0.92 K/uL  Auto Eosinophil # : 0.13 K/uL  Auto Basophil # : 0.06 K/uL  Auto Neutrophil % : 64.4 %  Auto Lymphocyte % : 25.0 %  Auto Monocyte % : 8.5 %  Auto Eosinophil % : 1.2 %  Auto Basophil % : 0.6 %    05-23    145  |  107  |  24<H>  ----------------------------<  93  3.9   |  32<H>  |  0.70    Ca    9.0      23 May 2024 06:07    TPro  6.9  /  Alb  3.4  /  TBili  0.3  /  DBili  x   /  AST  13  /  ALT  23  /  AlkPhos  74  05-22    Urinalysis Basic - ( 23 May 2024 06:07 )    Color: x / Appearance: x / SG: x / pH: x  Gluc: 93 mg/dL / Ketone: x  / Bili: x / Urobili: x   Blood: x / Protein: x / Nitrite: x   Leuk Esterase: x / RBC: x / WBC x   Sq Epi: x / Non Sq Epi: x / Bacteria: x        VITALS  T(C): 37 (05-23-24 @ 05:42), Max: 37 (05-23-24 @ 05:42)  HR: 58 (05-23-24 @ 05:42) (55 - 79)  BP: 129/59 (05-23-24 @ 05:42) (111/69 - 135/67)  RR: 18 (05-23-24 @ 05:42) (17 - 21)  SpO2: 100% (05-23-24 @ 05:42) (97% - 100%)  Wt(kg): --    ALLERGIES  Allergy Status Unknown      MEDICATIONS   aspirin enteric coated 81 milliGRAM(s) Oral daily  atorvastatin 80 milliGRAM(s) Oral at bedtime  clopidogrel Tablet 75 milliGRAM(s) Oral daily  dextrose 10% Bolus 125 milliLiter(s) IV Bolus once  dextrose 5%. 1000 milliLiter(s) IV Continuous <Continuous>  dextrose 5%. 1000 milliLiter(s) IV Continuous <Continuous>  dextrose 50% Injectable 25 Gram(s) IV Push once  dextrose 50% Injectable 12.5 Gram(s) IV Push once  dextrose Oral Gel 15 Gram(s) Oral once PRN  donepezil 5 milliGRAM(s) Oral at bedtime  enoxaparin Injectable 40 milliGRAM(s) SubCutaneous every 24 hours  glucagon  Injectable 1 milliGRAM(s) IntraMuscular once  insulin lispro (ADMELOG) corrective regimen sliding scale   SubCutaneous at bedtime  insulin lispro (ADMELOG) corrective regimen sliding scale   SubCutaneous three times a day before meals  melatonin 3 milliGRAM(s) Oral at bedtime PRN  multivitamin 1 Tablet(s) Oral daily  petrolatum white Ointment 1 Application(s) Topical once  senna 2 Tablet(s) Oral at bedtime PRN      ----------------------------------------------------------------------------------------  PHYSICAL EXAM  Constitutional - NAD, Comfortable  HEENT - NCAT, EOMI  Neck - Supple, No limited ROM  Chest - CTA bilaterally, No wheeze, No rhonchi, No crackles  Cardiovascular - RRR, S1S2, No murmurs  Abdomen - BS+, Soft, NTND  Extremities - No C/C/E, No calf tenderness   Neurologic Exam -                    Cognitive - Awake, Alert, AAO to self, place, date, year, situation     Communication - Fluent, No dysarthria, no aphasia     Cranial Nerves - CN 2-12 intact     Motor - No focal deficits                       Sensory - Intact to LT     Reflexes - DTR Intact, No primitive reflexive     Balance - WNL Static  Psychiatric - Mood stable, Affect WNL       HPI:  62 y/o F with PMH of HTN, T2DM,  h/o Left MCA stroke in Oct 2023 with right facial droop and right sided hemiparesis, referred by her radiologist after MRI of brain done today showed new stroke. As per patient's partner Connie, pt was complaining of right leg weakness and pain for past 2 days. Of note, this was her 6 month f/u MRI , which she got today and found that she has new acute CVA in left MCA territory. pt was brought to the ED. As per Connie, patient's family history is unknown. per Connie, patient also erazo not have any heart rhythm issues. It is unclear if there is hypercoagualable disorders in patient's family.      CT HEAD:  No acute intracranial hemorrhage. Compared to 3/1/24 there is evolution of left sided infarcts seen on MRI at that time. Site of acute of infarct  on today's MRI is uncertain.  Chronic infarct sites right anterior temporal lobe and those in the frieda appear stable.  New groundglass opacity in the left upper lung. Correlate clinically for pneumonia.    As per pt partner, she was in [facility] in [place]  where she felt the patient regressed. She has right sided weakness/ spasticity and right sided neuropathic pain   Currently she presents With aphasia               REVIEW OF SYSTEMS: No chest pain, shortness of breath, nausea, vomiting or diarhea.      PAST MEDICAL & SURGICAL HISTORY  HTN (hypertension)    DM (diabetes mellitus)    CVA (cerebrovascular accident)    No significant past surgical history        SOCIAL HISTORY  Smoking - Denied, EtOH - Denied, Drugs - Denied    FUNCTIONAL HISTORY:      pt lives w/sig other in apt, no stairs. pt is non ambulatory but able to transfer to w/c with assist. pt has commode and rw  CURRENT FUNCTIONAL STATUS:mod a       FAMILY HISTORY       RECENT LABS/IMAGING  CBC Full  -  ( 22 May 2024 20:48 )  WBC Count : 10.78 K/uL  RBC Count : 3.51 M/uL  Hemoglobin : 10.4 g/dL  Hematocrit : 32.6 %  Platelet Count - Automated : 258 K/uL  Mean Cell Volume : 92.9 fl  Mean Cell Hemoglobin : 29.6 pg  Mean Cell Hemoglobin Concentration : 31.9 gm/dL  Auto Neutrophil # : 6.95 K/uL  Auto Lymphocyte # : 2.69 K/uL  Auto Monocyte # : 0.92 K/uL  Auto Eosinophil # : 0.13 K/uL  Auto Basophil # : 0.06 K/uL  Auto Neutrophil % : 64.4 %  Auto Lymphocyte % : 25.0 %  Auto Monocyte % : 8.5 %  Auto Eosinophil % : 1.2 %  Auto Basophil % : 0.6 %    05-23    145  |  107  |  24<H>  ----------------------------<  93  3.9   |  32<H>  |  0.70    Ca    9.0      23 May 2024 06:07    TPro  6.9  /  Alb  3.4  /  TBili  0.3  /  DBili  x   /  AST  13  /  ALT  23  /  AlkPhos  74  05-22    Urinalysis Basic - ( 23 May 2024 06:07 )    Color: x / Appearance: x / SG: x / pH: x  Gluc: 93 mg/dL / Ketone: x  / Bili: x / Urobili: x   Blood: x / Protein: x / Nitrite: x   Leuk Esterase: x / RBC: x / WBC x   Sq Epi: x / Non Sq Epi: x / Bacteria: x        VITALS  T(C): 37 (05-23-24 @ 05:42), Max: 37 (05-23-24 @ 05:42)  HR: 58 (05-23-24 @ 05:42) (55 - 79)  BP: 129/59 (05-23-24 @ 05:42) (111/69 - 135/67)  RR: 18 (05-23-24 @ 05:42) (17 - 21)  SpO2: 100% (05-23-24 @ 05:42) (97% - 100%)  Wt(kg): --    ALLERGIES  Allergy Status Unknown      MEDICATIONS   aspirin enteric coated 81 milliGRAM(s) Oral daily  atorvastatin 80 milliGRAM(s) Oral at bedtime  clopidogrel Tablet 75 milliGRAM(s) Oral daily  dextrose 10% Bolus 125 milliLiter(s) IV Bolus once  dextrose 5%. 1000 milliLiter(s) IV Continuous <Continuous>  dextrose 5%. 1000 milliLiter(s) IV Continuous <Continuous>  dextrose 50% Injectable 25 Gram(s) IV Push once  dextrose 50% Injectable 12.5 Gram(s) IV Push once  dextrose Oral Gel 15 Gram(s) Oral once PRN  donepezil 5 milliGRAM(s) Oral at bedtime  enoxaparin Injectable 40 milliGRAM(s) SubCutaneous every 24 hours  glucagon  Injectable 1 milliGRAM(s) IntraMuscular once  insulin lispro (ADMELOG) corrective regimen sliding scale   SubCutaneous at bedtime  insulin lispro (ADMELOG) corrective regimen sliding scale   SubCutaneous three times a day before meals  melatonin 3 milliGRAM(s) Oral at bedtime PRN  multivitamin 1 Tablet(s) Oral daily  petrolatum white Ointment 1 Application(s) Topical once  senna 2 Tablet(s) Oral at bedtime PRN      ----------------------------------------------------------------------------------------  PHYSICAL EXAM  Constitutional - NAD, Comfortable  HEENT - NCAT, poor dentition   Neck - Supple, No limited ROM  Chest - CTA bilaterally, No wheeze, No rhonchi, No crackles  Cardiovascular - RRR, S1S2, No murmurs  Abdomen - BS+, Soft, NTND  Extremities - No C/C/E, No calf tenderness   Neurologic Exam -                    Cognitive - Awake, Alert, follows commands      Communication - + expressive> receptive aphasia     Cranial Nerves - CN 2-12 intact     Motor - R sided hemiparesis , + tone limiting MMT   LUE/LLE 5/5      Sensory - Intact to LT     Reflexes - DTR Intact, No primitive reflexive     Balance - not tested   Psychiatric - Mood stable, Affect WNL

## 2024-05-23 NOTE — H&P ADULT - NSHPREVIEWOFSYSTEMS_GEN_ALL_CORE
Neuro: No headaches, no neck pain/stiffness, no numbness, right sided weakness, facial droop   MSK: Right heel pain   ROS limited d/t patient mental status

## 2024-05-23 NOTE — H&P ADULT - ASSESSMENT
64 y/o F with PMH of HTN, T2DM,  h/o Left MCA stroke in Oct 2023 with right facial droop and right sided hemiparesis, referred by her radiologist after MRI of brain done today showed new stroke. As per patient's partner Connie, pt was complaining of right leg weakness and pain for past 2 days. Of note, this was her 6 month f/u MRI , which she got today and found that she has new acute CVA in left MCA territory. pt was brought to the ED. As per Connie, patient's family history is unknown. per Connie, patient also does not have any heart rhythm issues. It is unclear if there is hypercoagualable disorders in patient's family.

## 2024-05-23 NOTE — PHYSICAL THERAPY INITIAL EVALUATION ADULT - ADDITIONAL COMMENTS
pt's hx pta difficult to attain 2/2 pt w/aphasia, info per pt and previous chart. pt lives w/sig other in apt, no stairs. pt is non ambulatory but able to transfer to w/c with assist. pt has commode and rw

## 2024-05-23 NOTE — PROGRESS NOTE ADULT - SUBJECTIVE AND OBJECTIVE BOX
Patient is a 63y old  Female who presents with a chief complaint of CVA (23 May 2024 02:07)      Patient seen and examined at bedside. No overnight events reported.     ALLERGIES:  Allergy Status Unknown    MEDICATIONS  (STANDING):  aspirin enteric coated 81 milliGRAM(s) Oral daily  atorvastatin 80 milliGRAM(s) Oral at bedtime  clopidogrel Tablet 75 milliGRAM(s) Oral daily  dextrose 10% Bolus 125 milliLiter(s) IV Bolus once  dextrose 5%. 1000 milliLiter(s) (100 mL/Hr) IV Continuous <Continuous>  dextrose 5%. 1000 milliLiter(s) (50 mL/Hr) IV Continuous <Continuous>  dextrose 50% Injectable 12.5 Gram(s) IV Push once  dextrose 50% Injectable 25 Gram(s) IV Push once  donepezil 5 milliGRAM(s) Oral at bedtime  enoxaparin Injectable 40 milliGRAM(s) SubCutaneous every 24 hours  glucagon  Injectable 1 milliGRAM(s) IntraMuscular once  insulin lispro (ADMELOG) corrective regimen sliding scale   SubCutaneous three times a day before meals  insulin lispro (ADMELOG) corrective regimen sliding scale   SubCutaneous at bedtime  multivitamin 1 Tablet(s) Oral daily    MEDICATIONS  (PRN):  dextrose Oral Gel 15 Gram(s) Oral once PRN Blood Glucose LESS THAN 70 milliGRAM(s)/deciliter  melatonin 3 milliGRAM(s) Oral at bedtime PRN Insomnia  senna 2 Tablet(s) Oral at bedtime PRN Constipation    Vital Signs Last 24 Hrs  T(F): 98.6 (23 May 2024 05:42), Max: 98.6 (23 May 2024 05:42)  HR: 58 (23 May 2024 05:42) (55 - 79)  BP: 129/59 (23 May 2024 05:42) (111/69 - 135/67)  RR: 18 (23 May 2024 05:42) (17 - 21)  SpO2: 100% (23 May 2024 05:42) (97% - 100%)  I&O's Summary    PHYSICAL EXAM:  General: NAD, A/O, patient aphasic but able to make needs known, responding appropriately to stimuli   ENT: No gross hearing impairment, Moist mucous membranes, no thrush  Neck: Supple, No JVD  Lungs: Clear to auscultation bilaterally, good air entry, non-labored breathing  Cardio: RRR, S1/S2, No murmur  Abdomen: Soft, Nontender, Nondistended; Bowel sounds present  Extremities: No calf tenderness, No cyanosis, No pitting edema, pain noted on palpation RLE near ankle area, RUE wrist - no swelling, no redness   Psych: Appropriate mood and affect    LABS:                        10.4   10.78 )-----------( 258      ( 22 May 2024 20:48 )             32.6     05-23    145  |  107  |  24  ----------------------------<  93  3.9   |  32  |  0.70    Ca    9.0      23 May 2024 06:07    TPro  6.9  /  Alb  3.4  /  TBili  0.3  /  DBili  x   /  AST  13  /  ALT  23  /  AlkPhos  74  05-22          PT/INR - ( 22 May 2024 20:48 )   PT: 11.2 sec;   INR: 0.98 ratio         PTT - ( 22 May 2024 20:48 )  PTT:30.7 sec      CARDIAC MARKERS ( 22 May 2024 20:48 )  x     / 5.1 ng/L / x     / x     / x          03-02 Chol 165 mg/dL LDL -- HDL 54 mg/dL Trig 92 mg/dL              POCT Blood Glucose.: 124 mg/dL (23 May 2024 08:38)  POCT Blood Glucose.: 173 mg/dL (22 May 2024 20:28)      Urinalysis Basic - ( 23 May 2024 06:07 )    Color: x / Appearance: x / SG: x / pH: x  Gluc: 93 mg/dL / Ketone: x  / Bili: x / Urobili: x   Blood: x / Protein: x / Nitrite: x   Leuk Esterase: x / RBC: x / WBC x   Sq Epi: x / Non Sq Epi: x / Bacteria: x          RADIOLOGY & ADDITIONAL TESTS:    Care Discussed with Consultants/Other Providers:

## 2024-05-23 NOTE — CONSULT NOTE ADULT - SUBJECTIVE AND OBJECTIVE BOX
hx from chart and partner Connie by bedside    63 W who is consulted for acute stroke in left mca region on recent mri ordered by her outside neurologist. She has hx of htn, dm who had left mca stroke in 10/23 with right facial droop and right HP. She was also seen in [place] last month for stroke and was advised to start asa and plavix as per neurology notes but both she and Connie was not aware of the plavix. She has just been on asa and lipitor 80mg. Family history is unknown. We do not have the outside MRI that was recently done. Her hct done here shows No acute intracranial hemorrhage. Compared to 3/1/24 there is evolution of left sided infarcts seen on MRI at that time. Site of acute of infarct on today's MRI is uncertain. Chronic infarct sites right anterior temporal lobe and those in the frieda appear stable.    ct perfusion shows elevated transit time in left MCA inf div.    CTA INTRACRANIAL:    No arterial occlusion.    Severe/flow-limiting stenosis of left middle cerebral artery at inferior M2 trunk origin. In retrospect this is similar to 3/1/2024.    Milder stenosis of left M1 segment, mild-moderate stenosis of both cavernous internal carotid arteries and right PCA.    CTA EXTRACRANIAL:    No arterial occlusion or hemodynamically significant stenosis.    New groundglass opacity in the left upper lung. Correlate clinically for pneumonia.    Vital Signs Last 24 Hrs  T(C): 37 (23 May 2024 05:42), Max: 37 (23 May 2024 05:42)  T(F): 98.6 (23 May 2024 05:42), Max: 98.6 (23 May 2024 05:42)  HR: 58 (23 May 2024 05:42) (55 - 79)  BP: 129/59 (23 May 2024 05:42) (111/69 - 135/67)  BP(mean): --  RR: 18 (23 May 2024 05:42) (17 - 21)  SpO2: 100% (23 May 2024 05:42) (97% - 100%)    Parameters below as of 23 May 2024 02:30  Patient On (Oxygen Delivery Method): room air    motor aphasia (chronic), able to follow some commands.  PERRL right facial droop (chronic) tup midline  right hp (chronic)  sens intact lt    MEDICATIONS  (STANDING):  aspirin enteric coated 81 milliGRAM(s) Oral daily  atorvastatin 80 milliGRAM(s) Oral at bedtime  clopidogrel Tablet 75 milliGRAM(s) Oral daily  dextrose 10% Bolus 125 milliLiter(s) IV Bolus once  dextrose 5%. 1000 milliLiter(s) (50 mL/Hr) IV Continuous <Continuous>  dextrose 5%. 1000 milliLiter(s) (100 mL/Hr) IV Continuous <Continuous>  dextrose 50% Injectable 25 Gram(s) IV Push once  dextrose 50% Injectable 12.5 Gram(s) IV Push once  donepezil 5 milliGRAM(s) Oral at bedtime  enoxaparin Injectable 40 milliGRAM(s) SubCutaneous every 24 hours  glucagon  Injectable 1 milliGRAM(s) IntraMuscular once  insulin lispro (ADMELOG) corrective regimen sliding scale   SubCutaneous three times a day before meals  insulin lispro (ADMELOG) corrective regimen sliding scale   SubCutaneous at bedtime  multivitamin 1 Tablet(s) Oral daily    MEDICATIONS  (PRN):  dextrose Oral Gel 15 Gram(s) Oral once PRN Blood Glucose LESS THAN 70 milliGRAM(s)/deciliter  melatonin 3 milliGRAM(s) Oral at bedtime PRN Insomnia  senna 2 Tablet(s) Oral at bedtime PRN Constipation    63 W hx of htn, dm, Severe/flow-limiting stenosis of left middle cerebral artery at inferior M2 trunk origin, Milder stenosis of left M1 segment, mild-moderate stenosis of both cavernous internal carotid arteries and right PCA who has hx of stroke in left mca in both 10/23 and in march at [place]. She has right leg weakness and had repeat mri at outside facility which apparently showed acute infarct. Will recommend Connie bring in the CD to upload to pacs for our radiologist to examine. Otherwise, will recommend repeating MRI of brain wo contrast. 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 I think her noncompliance with plavix with asa is likely cause of this acute stroke. She was advised to stay on asa and plavix for 3 months. Lipitor 80mg indefinitely.  Recommend continued cardiac monitoring with ILR to rule out afib. Will contact my stroke colleague for further advice regarding possible intervention for her stenosis in future as well. Will recommend that primary team look into the opacity in left upper lung. Will contact primary team.       hx from chart and partner Connie by bedside    63 W who is consulted for acute stroke in left mca region on recent mri ordered by her outside neurologist. She has hx of htn, dm who had left mca stroke in 10/23 with right facial droop and right HP. She was also seen in [place] last month for stroke and was advised to start asa and plavix as per neurology notes but both she and Connie was not aware of the plavix. She has just been on asa and lipitor 80mg. Family history is unknown. We do not have the outside MRI that was recently done. Her hct done here shows No acute intracranial hemorrhage. Compared to 3/1/24 there is evolution of left sided infarcts seen on MRI at that time. Site of acute of infarct on today's MRI is uncertain. Chronic infarct sites right anterior temporal lobe and those in the frieda appear stable.    ct perfusion shows elevated transit time in left MCA inf div.    CTA INTRACRANIAL:    No arterial occlusion.    Severe/flow-limiting stenosis of left middle cerebral artery at inferior M2 trunk origin. In retrospect this is similar to 3/1/2024.    Milder stenosis of left M1 segment, mild-moderate stenosis of both cavernous internal carotid arteries and right PCA.    CTA EXTRACRANIAL:    No arterial occlusion or hemodynamically significant stenosis.    New groundglass opacity in the left upper lung. Correlate clinically for pneumonia.    Vital Signs Last 24 Hrs  T(C): 37 (23 May 2024 05:42), Max: 37 (23 May 2024 05:42)  T(F): 98.6 (23 May 2024 05:42), Max: 98.6 (23 May 2024 05:42)  HR: 58 (23 May 2024 05:42) (55 - 79)  BP: 129/59 (23 May 2024 05:42) (111/69 - 135/67)  BP(mean): --  RR: 18 (23 May 2024 05:42) (17 - 21)  SpO2: 100% (23 May 2024 05:42) (97% - 100%)    Parameters below as of 23 May 2024 02:30  Patient On (Oxygen Delivery Method): room air    motor aphasia (chronic), able to follow some commands.  PERRL right facial droop (chronic) tup midline  right hp (chronic)  sens intact lt    MEDICATIONS  (STANDING):  aspirin enteric coated 81 milliGRAM(s) Oral daily  atorvastatin 80 milliGRAM(s) Oral at bedtime  clopidogrel Tablet 75 milliGRAM(s) Oral daily  dextrose 10% Bolus 125 milliLiter(s) IV Bolus once  dextrose 5%. 1000 milliLiter(s) (50 mL/Hr) IV Continuous <Continuous>  dextrose 5%. 1000 milliLiter(s) (100 mL/Hr) IV Continuous <Continuous>  dextrose 50% Injectable 25 Gram(s) IV Push once  dextrose 50% Injectable 12.5 Gram(s) IV Push once  donepezil 5 milliGRAM(s) Oral at bedtime  enoxaparin Injectable 40 milliGRAM(s) SubCutaneous every 24 hours  glucagon  Injectable 1 milliGRAM(s) IntraMuscular once  insulin lispro (ADMELOG) corrective regimen sliding scale   SubCutaneous three times a day before meals  insulin lispro (ADMELOG) corrective regimen sliding scale   SubCutaneous at bedtime  multivitamin 1 Tablet(s) Oral daily    MEDICATIONS  (PRN):  dextrose Oral Gel 15 Gram(s) Oral once PRN Blood Glucose LESS THAN 70 milliGRAM(s)/deciliter  melatonin 3 milliGRAM(s) Oral at bedtime PRN Insomnia  senna 2 Tablet(s) Oral at bedtime PRN Constipation    63 W hx of htn, dm, Severe/flow-limiting stenosis of left middle cerebral artery at inferior M2 trunk origin, Milder stenosis of left M1 segment, mild-moderate stenosis of both cavernous internal carotid arteries and right PCA who has hx of stroke in left mca in both 10/23 and in march at [place]. She has right leg weakness and had repeat mri at outside facility which apparently showed acute infarct. Will recommend Connie bring in the CD to upload to pacs for our radiologist to examine. Otherwise, will recommend repeating MRI of brain wo contrast. We will need to confirm there is an acute stroke and not residual DWI from the stroke last month. 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 Will then advise contacting Dr. Hemant Dunbar for intervention. If it's not an acute stroke then she can stay on asa and plavix for 3 months. Lipitor 80mg indefinitely goal ldl less than 55.  Recommend continued cardiac monitoring with ILR to rule out afib. Will recommend that primary team look into the opacity in left upper lung. Will contact primary team.

## 2024-05-23 NOTE — H&P ADULT - NSHPPHYSICALEXAM_GEN_ALL_CORE
Vital Signs Last 24 Hrs  T(C): 36.8 (23 May 2024 02:30), Max: 36.8 (22 May 2024 20:23)  T(F): 98.2 (23 May 2024 02:30), Max: 98.3 (22 May 2024 20:23)  HR: 55 (23 May 2024 02:30) (55 - 79)  BP: 113/52 (23 May 2024 02:30) (111/69 - 135/67)  BP(mean): --  RR: 18 (23 May 2024 02:30) (17 - 21)  SpO2: 100% (23 May 2024 02:30) (97% - 100%)    Parameters below as of 23 May 2024 02:30  Patient On (Oxygen Delivery Method): room air    Constitutional: Pt lying in bed, awake and alert, mild distress  HEENT: EOMI, normal hearing, moist mucous membranes  Neck: Soft and supple, no JVD  Respiratory: CTABL, No wheezing, rales or rhonchi  Cardiovascular: S1S2+, RRR, no M/G/R  Gastrointestinal: BS+, soft, NT/ND, no guarding, no rebound  Extremities: No peripheral edema  Vascular: 2+ peripheral pulses  Neurological: AAOx0, right sided facial droop, right LE weakness (right hemiparesis)  Musculoskeletal: 5/5 strength LLE. 3-4/5 strength on RLE  Skin: No rashes

## 2024-05-23 NOTE — H&P ADULT - ATTENDING COMMENTS
I have personally seen and examined patient on the above date.  I discussed the case with Dr Shaw and I agree with findings and plan as detailed per note above, which I have amended where appropriate.    Superseding the above.   63 F with PMHx of  HTN, HLD, Type 2 DM not on insulin, hx of multiple CVAs 10/2023 with no residual deficits but last CVA 3/2024 with residual R HP and R facial droop with dysarthria and aphasia. Pt is poor historian and aphasic and partner Connie at bedside provided history. She related pt has been complaining of R leg pain and more weakness in the R leg for the past 2 days and today she noted an episode of drooling which has since resolved. Pt went for an MRI today which was her scheduled FU MRI and reported with "acute infarct in the posterior division of L MCA. advanced cerebrovascular dz including leukoencephalopahty, multiple old infarcts including in the brainstem." And was advised by radiologist to go to ER immediately for evaluation. Partner denied any recent sxs of fevers, chills, SOB, cough, CP, NVD, dysuria. LKW unknown as per above.  Partner denied any hx of afib or hypercoagulable state.  In ED NIHSS: 4 afebrile P: 72 BP: 122/65 sat 97% on RA.   FMHx: unknown - adopted  Sochx; +marijuana use in the past  PE:  NAD alert but dysphasia and not answering questions. +R facial droop, tongue midline  RUE neg pronator drift but weak R hand  compared to L. RLE 3+/5 drops to bed before 5 sec.   Skin: +sig tenderness at the R heel with small R heel crack/wound  CV:S1S2, RRR, no mgr  CL; CTA bl   abd; soft NT ND+ BS  Ext: neg CCE.   labs:   WBC: 10.78 hgb: 10.4 64%N BUN/cr: 36/0.8 trop; 5.1   EKG: personally rev. NSR at 67 bpm, q v1,2 no acute ST changes  CXR: PENDING  IMPRESSION:    CT HEAD:    No acute intracranial hemorrhage. Compared to 3/1/24 there is evolution   of left sided infarcts seen on MRI at that time. Site of acute of infarct   on today's MRI is uncertain.  Chronic infarct sites right anterior   temporal lobe and those in the frieda appear stable.    CT PERFUSION:    Elevated transit time in left MCA inferior division.    CTA INTRACRANIAL:    No arterial occlusion.    Severe/flow-limiting stenosis of left middle cerebral artery at inferior   M2 trunk origin. In retrospect this is similar to 3/1/2024.    Milder stenosis of left M1 segment, mild-moderate stenosis of both   cavernous internal carotid arteries and right PCA.    CTA EXTRACRANIAL:    No arterial occlusion or hemodynamically significant stenosis.    New groundglass opacity in the left upper lung. Correlate clinically for   pneumonia.    AP; 63 F with PMHx of  HTN, HLD, Type 2 DM not on insulin, hx of multiple CVAs 10/2023 with no residual deficits but last CVA 3/2024 with residual R HP and R facial droop with dysarthria and aphasia pw acute on chronic RLE weakness and MRI with new finding of acute infarct    #CVA  as d/w ED. Telestroke was consulted and pt not candidate for TNK and no LVO  cont asa, plavix, high intensity statin. (not clear whether pt is still on plavix tomas - clarify in am with partner)  neuo checks q4  passed dysphagia screen  check lipid panel, hgba1c, ECHO  Neuro consult for additional reccs.  #HTN  cont BP meds  #T2DM not on insulin  correctional insulin   #R heel wound  check R heel XRay to exclude foreign body   #DVT/GI proph  #GOC  Pt is FULL CODE as per pt and partner wishes.   [facility] RACHEL rev.   D/W Dr Patricio ED.

## 2024-05-23 NOTE — DIETITIAN INITIAL EVALUATION ADULT - PERSON TAUGHT/METHOD
Optimal nutrition, diet progression/verbal instruction/teach back - (Patient repeats in own words)/patient instructed/spouse instructed

## 2024-05-23 NOTE — PROGRESS NOTE ADULT - NS ATTEND AMEND GEN_ALL_CORE FT
Seen and examined. Chart reviewed.   patient is improving clinically.   Agree with above a/p  Edited where ever is necessary

## 2024-05-23 NOTE — H&P ADULT - PROBLEM SELECTOR PLAN 1
Dr Hebert office and message left to put a clearance in for mr spencer if he is ok for surgery with dr krishna   - No acute interventions   - neuro checks Q4 hr   - Vital Q4 hr   - hold BP meds to enhance cerebral perfusion  - c/w ASA and plavix  - it appears that the patient was supposed to take ASA and plavix for 3 months   - However from the medication list that was provided to me by Connie, it appears that she was not taking plavix and was only on ASA; This has to be clarified and is perhaps is the reason for patient's recent stroke   - c/w Aricept  - c/w high intensity statin   - Neurology consult ordered - f/u in the AM

## 2024-05-23 NOTE — CHART NOTE - NSCHARTNOTEFT_GEN_A_CORE
Contacted by nurse, partner reporting patient has had vaginal bleeding since a couple days ago while wiping after muctirion.  At bedside, Ms     incomplete

## 2024-05-23 NOTE — PROGRESS NOTE ADULT - ASSESSMENT
64 y/o F with PMH of HTN, T2DM,  h/o Left MCA stroke in Oct 2023 with right facial droop and right sided hemiparesis, referred by her radiologist after MRI of brain done showed new stroke. As per patient's partner Connie, pt was complaining of right leg weakness and pain for past 2 days. Of note, this was her 6 month f/u MRI , which found that she has new acute CVA in left MCA territory. pt was brought to the ED.     #L MCA stroke; new   - CT Head - No acute intracranial hemorrhage. Compared to 3/1/24 there is evolution of left sided infarcts seen on MRI at that time. Site of acute of infarct on today's MRI is uncertain.  Chronic infarct sites right anterior temporal lobe and those in the frieda appear stable  - No acute interventions  - neuro checks Q4 hr   - hold BP meds to enhance cerebral perfusion  - c/w ASA and plavix   - continue Aricept  - continue high intensity statin   - echo pending   - PT/OT pending  - SLP pending   - Neurology consult pending    #Pain of right heel  #Pain of right wrist   - XR right heel pending  - XR right wrist pending  - pain management     #HTN  - permissive HTN   - Hold lisinopril and amlodipine    #DM2  - ISS qAC/HS and bedtime   - Hold metformin while patient is admitted to the hospital    #DVTppx  - enoxaparin    #GOC full code     Dispo: pending neurology consult    5/23 Family and patient updated at bedside                  62 y/o F with PMH of HTN, T2DM,  h/o Left MCA stroke in Oct 2023 with right facial droop and right sided hemiparesis, referred by her radiologist after MRI of brain done showed new stroke. As per patient's partner Connie, pt was complaining of right leg weakness and pain for past 2 days. Of note, this was her 6 month f/u MRI , which found that she has new acute CVA in left MCA territory. pt was brought to the ED.     #L MCA stroke; new   - CT Head - No acute intracranial hemorrhage. Compared to 3/1/24 there is evolution of left sided infarcts seen on MRI at that time. Site of acute of infarct on today's MRI is uncertain.  Chronic infarct sites right anterior temporal lobe and those in the frieda appear stable  - MRI head pending   - No acute interventions  - neuro checks Q4 hr   - hold BP meds to enhance cerebral perfusion  - c/w ASA and plavix   - continue Aricept  - continue high intensity statin   - echo pending   - PT/OT pending  - SLP pending   - Neurology consult - rec MRI     #Pain of right heel  #Pain of right wrist   - XR right heel pending  - XR right wrist pending  - pain management     #HTN  - permissive HTN   - Hold lisinopril and amlodipine    #DM2  - ISS qAC/HS and bedtime   - Hold metformin while patient is admitted to the hospital    #DVTppx  - enoxaparin    #GOC full code     Dispo: pending MRI    5/23 Family and patient updated at bedside                  64 y/o F with PMH of HTN, T2DM,  h/o Left MCA stroke in Oct 2023 with right facial droop and right sided hemiparesis, referred by her radiologist after MRI of brain done showed new stroke. As per patient's partner Connie, pt was complaining of right leg weakness and pain for past 2 days. Of note, this was her 6 month f/u MRI , which found that she has new acute CVA in left MCA territory. pt was brought to the ED.     #L MCA stroke; new   - CT Head - No acute intracranial hemorrhage. Compared to 3/1/24 there is evolution of left sided infarcts seen on MRI at that time. Site of acute of infarct on today's MRI is uncertain.  Chronic infarct sites right anterior temporal lobe and those in the frieda appear stable  - MRI head pending   - No acute interventions  - neuro checks Q4 hr   - hold BP meds to enhance cerebral perfusion  - c/w ASA and plavix   - continue Aricept  - continue high intensity statin   - echo pending   - PT rec acute rehab   - SLP pending   - PMR pending   - Neurology consult - rec MRI     #Pain of right heel  #Pain of right wrist   - XR right heel pending  - XR right wrist pending  - pain management     #HTN  - permissive HTN   - Hold lisinopril and amlodipine    #DM2  - ISS qAC/HS and bedtime   - Hold metformin while patient is admitted to the hospital    #DVTppx  - enoxaparin    #GOC full code     Dispo: pending MRI, PMR     5/23 Family and patient updated at bedside                  62 y/o F with PMH of HTN, T2DM,  h/o Left MCA stroke in Oct 2023 with right facial droop and right sided hemiparesis, referred by her radiologist after MRI of brain done showed new stroke. As per patient's partner Connie, pt was complaining of right leg weakness and pain for past 2 days. Of note, this was her 6 month f/u MRI , which found that she has new acute CVA in left MCA territory. pt was brought to the ED.     #L MCA stroke; new   - CT Head - No acute intracranial hemorrhage. Compared to 3/1/24 there is evolution of left sided infarcts seen on MRI at that time. Site of acute of infarct on today's MRI is uncertain.  Chronic infarct sites right anterior temporal lobe and those in the frieda appear stable  - MRI head pending   - No acute interventions  - neuro checks Q4 hr   - hold BP meds to enhance cerebral perfusion  - c/w ASA and plavix   - continue Aricept  - continue high intensity statin   - echo pending   - PT rec acute rehab   - SLP pending   - PMR pending  - Neurology consult - rec MRI     #Pain of right heel  #Pain of right wrist   - XR right heel pending  - XR right wrist pending  - pain management     #HTN  - permissive HTN   - Hold lisinopril and amlodipine    #DM2  - ISS qAC/HS and bedtime   - Hold metformin while patient is admitted to the hospital    #DVTppx  - enoxaparin    #GOC full code     Dispo: pending MRI, PMR , xrays    5/23 Family and patient updated at bedside                  64 y/o F with PMH of HTN, T2DM,  h/o Left MCA stroke in Oct 2023 with right facial droop and right sided hemiparesis, referred by her radiologist after MRI of brain done showed new stroke. As per patient's partner Connie, pt was complaining of right leg weakness and pain for past 2 days. Of note, this was her 6 month f/u MRI , which found that she has new acute CVA in left MCA territory. pt was brought to the ED.     #L MCA stroke; new   - CT Head - No acute intracranial hemorrhage. Compared to 3/1/24 there is evolution of left sided infarcts seen on MRI at that time. Site of acute of infarct on today's MRI is uncertain.  Chronic infarct sites right anterior temporal lobe and those in the frieda appear stable  - MRI head - Acute left MCA infarct as above. Chronic infarct in the right anterior temporal lobe, bilateral basal ganglia and frieda as above. Extensive T2/FLAIR hyperintense signal in the periventricular white matter, pontine white matter, and bilateral brachium pontis white matter, compatible with advanced chronic microvascular ischemic changes.  - No acute interventions  - neuro checks Q4 hr   - hold BP meds to enhance cerebral perfusion  - c/w ASA and plavix   - continue Aricept  - continue high intensity statin   - echo pending   - PT rec acute rehab   - SLP pending   - PMR favors acute rehab  - Neurology consult - rec MRI     #Pain of right heel  #Pain of right wrist   - XR right heel pending  - XR right wrist pending  - pain management     #HTN  - permissive HTN   - Hold lisinopril and amlodipine    #DM2  - ISS qAC/HS and bedtime   - Hold metformin while patient is admitted to the hospital    #DVTppx  - enoxaparin    #GOC full code     Dispo: pending MRI, xrays - PMR accepted patient     5/23 Family and patient updated at bedside

## 2024-05-23 NOTE — DIETITIAN INITIAL EVALUATION ADULT - PROBLEM SELECTOR PLAN 1
- No acute interventions   - neuro checks Q4 hr   - Vital Q4 hr   - hold BP meds to enhance cerebral perfusion  - c/w ASA and plavix  - it appears that the patient was supposed to take ASA and plavix for 3 months   - However from the medication list that was provided to me by Connie, it appears that she was not taking plavix and was only on ASA; This has to be clarified and is perhaps is the reason for patient's recent stroke   - c/w Aricept  - c/w high intensity statin   - Neurology consult ordered - f/u in the AM

## 2024-05-23 NOTE — H&P ADULT - HISTORY OF PRESENT ILLNESS
Stroke 62 y/o F with PMH of HTN, T2DM,  h/o Left MCA stroke in Oct 2023 with right facial droop and right sided hemiparesis, referred by her radiologist after MRI of brain done today showed new stroke. As per patient's partner Connie, pt was complaining of right leg weakness and pain for past 2 days. Of note, this was her 6 month f/u MRI , which she got today and found that she has new acute CVA in left MCA territory. pt was brought to the ED. As per Connie, patient's family history is unknown. per Connie, patient also erazo not have any heart rhythm issues. It is unclear if there is hypercoagualable disorders in patient's family.      ED course:  T 98, HR 72, /68, RR 18, SpO2 97% on RA. NIHSS was 4. Patient was out of the TNK window so it was not administered. pt took ASA at home that day. No acute interventions were ordered by the neurology team in the ED. CT head and CTA reports as follows:     CT HEAD:  No acute intracranial hemorrhage. Compared to 3/1/24 there is evolution of left sided infarcts seen on MRI at that time. Site of acute of infarct  on today's MRI is uncertain.  Chronic infarct sites right anterior temporal lobe and those in the frieda appear stable.    CT PERFUSION:  Elevated transit time in left MCA inferior division.    CTA INTRACRANIAL:  No arterial occlusion. Severe/flow-limiting stenosis of left middle cerebral artery at inferior. M2 trunk origin. In retrospect this is similar to 3/1/2024. Milder stenosis of left M1 segment, mild-moderate stenosis of both cavernous internal carotid arteries and right PCA.    CTA EXTRACRANIAL:  No arterial occlusion or hemodynamically significant stenosis. New groundglass opacity in the left upper lung. Correlate clinically for pneumonia.    patient was admitted to the floors for further management.

## 2024-05-23 NOTE — PHYSICAL THERAPY INITIAL EVALUATION ADULT - PERTINENT HX OF CURRENT PROBLEM, REHAB EVAL
62 y/o F with PMH of HTN, T2DM,  h/o Left MCA stroke in Oct 2023 with right facial droop and right sided hemiparesis, referred by her radiologist after MRI of brain done today showed new stroke. As per patient's partner Conine, pt was complaining of right leg weakness and pain for past 2 days. Of note, this was her 6 month f/u MRI , which she got today and found that she has new acute CVA in left MCA territory. pt was brought to the ED. As per Connie, patient's family history is unknown. per Connie, patient also erazo not have any heart rhythm issues. It is unclear if there is hypercoagualable disorders in patient's family.

## 2024-05-23 NOTE — DIETITIAN INITIAL EVALUATION ADULT - OTHER INFO
62 y/o F with PMH of HTN, T2DM,  h/o Left MCA stroke in Oct 2023 with right facial droop and right sided hemiparesis, referred by her radiologist after MRI of brain done today showed new stroke. As per patient's partner Connie, pt was complaining of right leg weakness and pain for past 2 days. Of note, this was her 6 month f/u MRI , which she got today and found that she has new acute CVA in left MCA territory. pt was brought to the ED. As per Connie, patient's family history is unknown. per Connie, patient also does not have any heart rhythm issues. It is unclear if there is hypercoagualable disorders in patient's family.  Pt pending formal swallow evaluation- endorses hunger at time of visit. Reports good appetite- gained ~14lbs since 3/2024 admission to rehab. (3/4) wt 87lbs. .3lbs. NFPE continue to show evidence of severe muscle wasting/fat loss. Pt receptive to resume glucerna supplements as able (vanilla). No skin breakdown or edema. Diet advancement per SLP.

## 2024-05-23 NOTE — PATIENT PROFILE ADULT - FALL HARM RISK - HARM RISK INTERVENTIONS

## 2024-05-23 NOTE — DIETITIAN INITIAL EVALUATION ADULT - ORAL INTAKE PTA/DIET HISTORY
Chart reviewed from recent admission to [facility] rehab where pt was receiving soft and bite sized solids with glucerna BID. As per discussion with pt's partner, pt continued with this diet consistency at home. Pt's partner has been providing 3 meals/snacks & pt's appetite/weight has been improving. They continue supplementation with Premier protein shakes daily. NKFA. Hx DMII, A1c 5.8%.

## 2024-05-23 NOTE — DIETITIAN INITIAL EVALUATION ADULT - PERTINENT MEDS FT
MEDICATIONS  (STANDING):  aspirin enteric coated 81 milliGRAM(s) Oral daily  atorvastatin 80 milliGRAM(s) Oral at bedtime  clopidogrel Tablet 75 milliGRAM(s) Oral daily  dextrose 10% Bolus 125 milliLiter(s) IV Bolus once  dextrose 5%. 1000 milliLiter(s) (100 mL/Hr) IV Continuous <Continuous>  dextrose 5%. 1000 milliLiter(s) (50 mL/Hr) IV Continuous <Continuous>  dextrose 50% Injectable 12.5 Gram(s) IV Push once  dextrose 50% Injectable 25 Gram(s) IV Push once  donepezil 5 milliGRAM(s) Oral at bedtime  enoxaparin Injectable 40 milliGRAM(s) SubCutaneous every 24 hours  glucagon  Injectable 1 milliGRAM(s) IntraMuscular once  insulin lispro (ADMELOG) corrective regimen sliding scale   SubCutaneous at bedtime  insulin lispro (ADMELOG) corrective regimen sliding scale   SubCutaneous three times a day before meals  multivitamin 1 Tablet(s) Oral daily    MEDICATIONS  (PRN):  dextrose Oral Gel 15 Gram(s) Oral once PRN Blood Glucose LESS THAN 70 milliGRAM(s)/deciliter  melatonin 3 milliGRAM(s) Oral at bedtime PRN Insomnia  senna 2 Tablet(s) Oral at bedtime PRN Constipation

## 2024-05-24 VITALS
OXYGEN SATURATION: 100 % | SYSTOLIC BLOOD PRESSURE: 127 MMHG | DIASTOLIC BLOOD PRESSURE: 85 MMHG | RESPIRATION RATE: 18 BRPM | HEART RATE: 82 BPM | TEMPERATURE: 98 F

## 2024-05-24 LAB
APPEARANCE UR: CLEAR — SIGNIFICANT CHANGE UP
BACTERIA # UR AUTO: NEGATIVE /HPF — SIGNIFICANT CHANGE UP
BILIRUB UR-MCNC: NEGATIVE — SIGNIFICANT CHANGE UP
COLOR SPEC: YELLOW — SIGNIFICANT CHANGE UP
DIFF PNL FLD: NEGATIVE — SIGNIFICANT CHANGE UP
EPI CELLS # UR: 4 — SIGNIFICANT CHANGE UP
GLUCOSE BLDC GLUCOMTR-MCNC: 157 MG/DL — HIGH (ref 70–99)
GLUCOSE BLDC GLUCOMTR-MCNC: 164 MG/DL — HIGH (ref 70–99)
GLUCOSE BLDC GLUCOMTR-MCNC: 175 MG/DL — HIGH (ref 70–99)
GLUCOSE UR QL: NEGATIVE MG/DL — SIGNIFICANT CHANGE UP
HCT VFR BLD CALC: 36.5 % — SIGNIFICANT CHANGE UP (ref 34.5–45)
HGB BLD-MCNC: 11.3 G/DL — LOW (ref 11.5–15.5)
KETONES UR-MCNC: NEGATIVE MG/DL — SIGNIFICANT CHANGE UP
LEUKOCYTE ESTERASE UR-ACNC: ABNORMAL
MCHC RBC-ENTMCNC: 29.4 PG — SIGNIFICANT CHANGE UP (ref 27–34)
MCHC RBC-ENTMCNC: 31 GM/DL — LOW (ref 32–36)
MCV RBC AUTO: 94.8 FL — SIGNIFICANT CHANGE UP (ref 80–100)
NITRITE UR-MCNC: NEGATIVE — SIGNIFICANT CHANGE UP
NRBC # BLD: 0 /100 WBCS — SIGNIFICANT CHANGE UP (ref 0–0)
PH UR: 6 — SIGNIFICANT CHANGE UP (ref 5–8)
PLATELET # BLD AUTO: 253 K/UL — SIGNIFICANT CHANGE UP (ref 150–400)
PROT UR-MCNC: NEGATIVE MG/DL — SIGNIFICANT CHANGE UP
RBC # BLD: 3.85 M/UL — SIGNIFICANT CHANGE UP (ref 3.8–5.2)
RBC # FLD: 12.6 % — SIGNIFICANT CHANGE UP (ref 10.3–14.5)
RBC CASTS # UR COMP ASSIST: 0 /HPF — SIGNIFICANT CHANGE UP (ref 0–4)
SP GR SPEC: 1.01 — SIGNIFICANT CHANGE UP (ref 1–1.03)
TSH SERPL-MCNC: 0.31 UIU/ML — LOW (ref 0.36–3.74)
UROBILINOGEN FLD QL: 0.2 MG/DL — SIGNIFICANT CHANGE UP (ref 0.2–1)
WBC # BLD: 10.78 K/UL — HIGH (ref 3.8–10.5)
WBC # FLD AUTO: 10.78 K/UL — HIGH (ref 3.8–10.5)
WBC UR QL: 3 /HPF — SIGNIFICANT CHANGE UP (ref 0–5)

## 2024-05-24 PROCEDURE — 85610 PROTHROMBIN TIME: CPT

## 2024-05-24 PROCEDURE — 99285 EMERGENCY DEPT VISIT HI MDM: CPT

## 2024-05-24 PROCEDURE — 99222 1ST HOSP IP/OBS MODERATE 55: CPT

## 2024-05-24 PROCEDURE — 97166 OT EVAL MOD COMPLEX 45 MIN: CPT

## 2024-05-24 PROCEDURE — 97161 PT EVAL LOW COMPLEX 20 MIN: CPT

## 2024-05-24 PROCEDURE — 85025 COMPLETE CBC W/AUTO DIFF WBC: CPT

## 2024-05-24 PROCEDURE — 99418 PROLNG IP/OBS E/M EA 15 MIN: CPT

## 2024-05-24 PROCEDURE — 71045 X-RAY EXAM CHEST 1 VIEW: CPT

## 2024-05-24 PROCEDURE — 92610 EVALUATE SWALLOWING FUNCTION: CPT

## 2024-05-24 PROCEDURE — 82962 GLUCOSE BLOOD TEST: CPT

## 2024-05-24 PROCEDURE — 76830 TRANSVAGINAL US NON-OB: CPT

## 2024-05-24 PROCEDURE — 0042T: CPT | Mod: MC

## 2024-05-24 PROCEDURE — 73110 X-RAY EXAM OF WRIST: CPT

## 2024-05-24 PROCEDURE — 86900 BLOOD TYPING SEROLOGIC ABO: CPT

## 2024-05-24 PROCEDURE — 70450 CT HEAD/BRAIN W/O DYE: CPT | Mod: MC

## 2024-05-24 PROCEDURE — 93306 TTE W/DOPPLER COMPLETE: CPT

## 2024-05-24 PROCEDURE — 84484 ASSAY OF TROPONIN QUANT: CPT

## 2024-05-24 PROCEDURE — 80053 COMPREHEN METABOLIC PANEL: CPT

## 2024-05-24 PROCEDURE — 99233 SBSQ HOSP IP/OBS HIGH 50: CPT

## 2024-05-24 PROCEDURE — 73630 X-RAY EXAM OF FOOT: CPT

## 2024-05-24 PROCEDURE — 70498 CT ANGIOGRAPHY NECK: CPT | Mod: MC

## 2024-05-24 PROCEDURE — 83036 HEMOGLOBIN GLYCOSYLATED A1C: CPT

## 2024-05-24 PROCEDURE — 86850 RBC ANTIBODY SCREEN: CPT

## 2024-05-24 PROCEDURE — 84443 ASSAY THYROID STIM HORMONE: CPT

## 2024-05-24 PROCEDURE — 36415 COLL VENOUS BLD VENIPUNCTURE: CPT

## 2024-05-24 PROCEDURE — 93005 ELECTROCARDIOGRAM TRACING: CPT

## 2024-05-24 PROCEDURE — 86901 BLOOD TYPING SEROLOGIC RH(D): CPT

## 2024-05-24 PROCEDURE — 70551 MRI BRAIN STEM W/O DYE: CPT | Mod: MC

## 2024-05-24 PROCEDURE — 92523 SPEECH SOUND LANG COMPREHEN: CPT

## 2024-05-24 PROCEDURE — 81001 URINALYSIS AUTO W/SCOPE: CPT

## 2024-05-24 PROCEDURE — 85730 THROMBOPLASTIN TIME PARTIAL: CPT

## 2024-05-24 PROCEDURE — 80048 BASIC METABOLIC PNL TOTAL CA: CPT

## 2024-05-24 PROCEDURE — 70496 CT ANGIOGRAPHY HEAD: CPT | Mod: MC

## 2024-05-24 PROCEDURE — 76830 TRANSVAGINAL US NON-OB: CPT | Mod: 26

## 2024-05-24 PROCEDURE — 85027 COMPLETE CBC AUTOMATED: CPT

## 2024-05-24 PROCEDURE — 80061 LIPID PANEL: CPT

## 2024-05-24 RX ORDER — TIZANIDINE 4 MG/1
1 TABLET ORAL
Qty: 0 | Refills: 0 | DISCHARGE
Start: 2024-05-24

## 2024-05-24 RX ORDER — TIZANIDINE 4 MG/1
2 TABLET ORAL EVERY 8 HOURS
Refills: 0 | Status: DISCONTINUED | OUTPATIENT
Start: 2024-05-24 | End: 2024-05-24

## 2024-05-24 RX ADMIN — Medication 2: at 17:21

## 2024-05-24 RX ADMIN — ENOXAPARIN SODIUM 40 MILLIGRAM(S): 100 INJECTION SUBCUTANEOUS at 06:40

## 2024-05-24 RX ADMIN — Medication 2: at 08:25

## 2024-05-24 RX ADMIN — Medication 1 TABLET(S): at 14:09

## 2024-05-24 RX ADMIN — Medication 2: at 11:46

## 2024-05-24 RX ADMIN — CLOPIDOGREL BISULFATE 75 MILLIGRAM(S): 75 TABLET, FILM COATED ORAL at 14:09

## 2024-05-24 RX ADMIN — Medication 81 MILLIGRAM(S): at 14:08

## 2024-05-24 NOTE — SPEECH LANGUAGE PATHOLOGY EVALUATION - SLP VERBAL STRATEGIES
melodic intonation/augmentative communication/gestures/phonemic cues/sentence completion/unison speech

## 2024-05-24 NOTE — PROGRESS NOTE ADULT - SUBJECTIVE AND OBJECTIVE BOX
Medicine Progress Note    Patient is a 63y old  Female who presents with a chief complaint of CVA (23 May 2024 14:07)      SUBJECTIVE / OVERNIGHT EVENTS:  seen and examined  Chart reviewed  overnight partner reported vaginal bleeding. she reportedly has vaginal bleeding on and off for past couple of week specially when they they wipe after urination. did not seek medical attention. per resident, no active bleeding was seen on exam, however trace old blood was seen at the periphery of pad.   BM+  No pain  No complaints  limited due to aphasia.   RN reported HR 55 at rest, upto 150 while walking    ADDITIONAL REVIEW OF SYSTEMS:  denied CP/SOB/palpitation/dizziness/abd pain/dysuria/headaches.    MEDICATIONS  (STANDING):  aspirin enteric coated 81 milliGRAM(s) Oral daily  atorvastatin 80 milliGRAM(s) Oral at bedtime  clopidogrel Tablet 75 milliGRAM(s) Oral daily  dextrose 10% Bolus 125 milliLiter(s) IV Bolus once  dextrose 5%. 1000 milliLiter(s) (100 mL/Hr) IV Continuous <Continuous>  dextrose 5%. 1000 milliLiter(s) (50 mL/Hr) IV Continuous <Continuous>  dextrose 50% Injectable 12.5 Gram(s) IV Push once  dextrose 50% Injectable 25 Gram(s) IV Push once  donepezil 5 milliGRAM(s) Oral at bedtime  enoxaparin Injectable 40 milliGRAM(s) SubCutaneous every 24 hours  glucagon  Injectable 1 milliGRAM(s) IntraMuscular once  insulin lispro (ADMELOG) corrective regimen sliding scale   SubCutaneous three times a day before meals  insulin lispro (ADMELOG) corrective regimen sliding scale   SubCutaneous at bedtime  multivitamin 1 Tablet(s) Oral daily    MEDICATIONS  (PRN):  dextrose Oral Gel 15 Gram(s) Oral once PRN Blood Glucose LESS THAN 70 milliGRAM(s)/deciliter  melatonin 3 milliGRAM(s) Oral at bedtime PRN Insomnia  senna 2 Tablet(s) Oral at bedtime PRN Constipation    CAPILLARY BLOOD GLUCOSE      POCT Blood Glucose.: 175 mg/dL (24 May 2024 11:43)  POCT Blood Glucose.: 164 mg/dL (24 May 2024 08:23)  POCT Blood Glucose.: 177 mg/dL (23 May 2024 22:05)  POCT Blood Glucose.: 99 mg/dL (23 May 2024 17:26)  POCT Blood Glucose.: 182 mg/dL (23 May 2024 12:51)    I&O's Summary      PHYSICAL EXAM:  Vital Signs Last 24 Hrs  T(C): 36.5 (24 May 2024 06:30), Max: 36.7 (23 May 2024 20:20)  T(F): 97.7 (24 May 2024 06:30), Max: 98.1 (24 May 2024 00:38)  HR: 59 (24 May 2024 06:30) (54 - 59)  BP: 154/78 (24 May 2024 06:30) (133/63 - 154/78)  BP(mean): --  RR: 17 (24 May 2024 06:30) (17 - 20)  SpO2: 100% (24 May 2024 06:30) (99% - 100%)    Parameters below as of 24 May 2024 06:30  Patient On (Oxygen Delivery Method): room air    GENERAL: Not in distress. Alert    HEENT: AT/NC. clear conjuctiva, MMM.   no pallor or icterus  CARDIOVASCULAR: RRR S1, S2. No murmur/rubs/gallop  LUNGS: BLAE+, no rales, no wheezing, no rhonchi.    ABDOMEN: ND. Soft,  NT, no guarding / rebound / rigidity. BS normoactive. No CVA tenderness.    BACK: No spine tenderness.  EXTREMITIES: no edema. no leg or calf TP. right wrist stiffness/flexion deformity  SKIN: no rash.   NEUROLOGIC: Alert, awake. right sided weakness. sensation intact, speech fluent.    PSYCHIATRIC: Calm.  No agitation.      LABS:                        11.3   10.78 )-----------( 253      ( 24 May 2024 09:09 )             36.5     05-23    145  |  107  |  24<H>  ----------------------------<  93  3.9   |  32<H>  |  0.70    Ca    9.0      23 May 2024 06:07    TPro  6.9  /  Alb  3.4  /  TBili  0.3  /  DBili  x   /  AST  13  /  ALT  23  /  AlkPhos  74  05-22    PT/INR - ( 22 May 2024 20:48 )   PT: 11.2 sec;   INR: 0.98 ratio         PTT - ( 22 May 2024 20:48 )  PTT:30.7 sec      Urinalysis Basic - ( 24 May 2024 10:20 )    Color: Yellow / Appearance: Clear / S.014 / pH: x  Gluc: x / Ketone: Negative mg/dL  / Bili: Negative / Urobili: 0.2 mg/dL   Blood: x / Protein: Negative mg/dL / Nitrite: Negative   Leuk Esterase: Small / RBC: 0 /HPF / WBC 3 /HPF   Sq Epi: x / Non Sq Epi: x / Bacteria: Negative /HPF    RADIOLOGY & ADDITIONAL TESTS:    < from: TTE Echo Complete w/o Contrast w/ Doppler (24 @ 09:20) >  Summary:   1. Left ventricular ejection fraction, by visual estimation, is 60 to   65%.   2. Normal global left ventricular systolic function.   3. Normal left ventricular internal cavity size.   4. Spectral Doppler shows impaired relaxation pattern of left   ventricular myocardialfilling (Grade I diastolic dysfunction).   5. Mildly enlarged right ventricle.   6. Mild thickening of the anterior and posterior mitral valve leaflets.   7. No evidence of mitral valve regurgitation.   8. Sclerotic aortic valve with normal opening.   9. LA volume Index is 19.2 ml/m² ml/m2.    [id number] Carlos Jackman MD, FACC , MD, FACC Electronically   signed on 2024 at 1:54:42 PM    < end of copied text >    Imaging from Last 24 Hours:    Electrocardiogram/QTc Interval:    COORDINATION OF CARE:  Care Discussed with Consultants/Other Providers:   Medicine Progress Note    Patient is a 63y old  Female who presents with a chief complaint of CVA (23 May 2024 14:07)      SUBJECTIVE / OVERNIGHT EVENTS:  seen and examined  Chart reviewed  overnight partner reported vaginal bleeding. she reportedly has vaginal bleeding on and off for past couple of week specially when they they wipe after urination. did not seek medical attention. per resident, no active bleeding was seen on exam, however trace old blood was seen at the periphery of pad.   BM+  No pain  No complaints  limited due to aphasia.   RN reported HR 55 at rest, upto 150 while walking    ADDITIONAL REVIEW OF SYSTEMS:  denied CP/SOB/palpitation/dizziness/abd pain/dysuria/headaches.    MEDICATIONS  (STANDING):  aspirin enteric coated 81 milliGRAM(s) Oral daily  atorvastatin 80 milliGRAM(s) Oral at bedtime  clopidogrel Tablet 75 milliGRAM(s) Oral daily  dextrose 10% Bolus 125 milliLiter(s) IV Bolus once  dextrose 5%. 1000 milliLiter(s) (100 mL/Hr) IV Continuous <Continuous>  dextrose 5%. 1000 milliLiter(s) (50 mL/Hr) IV Continuous <Continuous>  dextrose 50% Injectable 12.5 Gram(s) IV Push once  dextrose 50% Injectable 25 Gram(s) IV Push once  donepezil 5 milliGRAM(s) Oral at bedtime  enoxaparin Injectable 40 milliGRAM(s) SubCutaneous every 24 hours  glucagon  Injectable 1 milliGRAM(s) IntraMuscular once  insulin lispro (ADMELOG) corrective regimen sliding scale   SubCutaneous three times a day before meals  insulin lispro (ADMELOG) corrective regimen sliding scale   SubCutaneous at bedtime  multivitamin 1 Tablet(s) Oral daily    MEDICATIONS  (PRN):  dextrose Oral Gel 15 Gram(s) Oral once PRN Blood Glucose LESS THAN 70 milliGRAM(s)/deciliter  melatonin 3 milliGRAM(s) Oral at bedtime PRN Insomnia  senna 2 Tablet(s) Oral at bedtime PRN Constipation    CAPILLARY BLOOD GLUCOSE      POCT Blood Glucose.: 175 mg/dL (24 May 2024 11:43)  POCT Blood Glucose.: 164 mg/dL (24 May 2024 08:23)  POCT Blood Glucose.: 177 mg/dL (23 May 2024 22:05)  POCT Blood Glucose.: 99 mg/dL (23 May 2024 17:26)  POCT Blood Glucose.: 182 mg/dL (23 May 2024 12:51)    I&O's Summary      PHYSICAL EXAM:  Vital Signs Last 24 Hrs  T(C): 36.5 (24 May 2024 06:30), Max: 36.7 (23 May 2024 20:20)  T(F): 97.7 (24 May 2024 06:30), Max: 98.1 (24 May 2024 00:38)  HR: 59 (24 May 2024 06:30) (54 - 59)  BP: 154/78 (24 May 2024 06:30) (133/63 - 154/78)  BP(mean): --  RR: 17 (24 May 2024 06:30) (17 - 20)  SpO2: 100% (24 May 2024 06:30) (99% - 100%)    Parameters below as of 24 May 2024 06:30  Patient On (Oxygen Delivery Method): room air    GENERAL: Not in distress. Alert    HEENT: AT/NC. clear conjuctiva, MMM.   no pallor or icterus  CARDIOVASCULAR: RRR S1, S2. No murmur/rubs/gallop  LUNGS: BLAE+, no rales, no wheezing, no rhonchi.    ABDOMEN: ND. Soft,  NT, no guarding / rebound / rigidity. BS normoactive. No CVA tenderness.    BACK: No spine tenderness.  EXTREMITIES: no edema. no leg or calf TP. right wrist stiffness/flexion deformity  SKIN: no rash.   NEUROLOGIC: Alert, awake. right sided weakness. sensation intact, speech fluent.    PSYCHIATRIC: Calm.  No agitation.      LABS:                        11.3   10.78 )-----------( 253      ( 24 May 2024 09:09 )             36.5     05-23    145  |  107  |  24<H>  ----------------------------<  93  3.9   |  32<H>  |  0.70    Ca    9.0      23 May 2024 06:07    TPro  6.9  /  Alb  3.4  /  TBili  0.3  /  DBili  x   /  AST  13  /  ALT  23  /  AlkPhos  74  05-22    PT/INR - ( 22 May 2024 20:48 )   PT: 11.2 sec;   INR: 0.98 ratio         PTT - ( 22 May 2024 20:48 )  PTT:30.7 sec      Urinalysis Basic - ( 24 May 2024 10:20 )    Color: Yellow / Appearance: Clear / S.014 / pH: x  Gluc: x / Ketone: Negative mg/dL  / Bili: Negative / Urobili: 0.2 mg/dL   Blood: x / Protein: Negative mg/dL / Nitrite: Negative   Leuk Esterase: Small / RBC: 0 /HPF / WBC 3 /HPF   Sq Epi: x / Non Sq Epi: x / Bacteria: Negative /HPF    RADIOLOGY & ADDITIONAL TESTS:    < from: TTE Echo Complete w/o Contrast w/ Doppler (24 @ 09:20) >  Summary:   1. Left ventricular ejection fraction, by visual estimation, is 60 to   65%.   2. Normal global left ventricular systolic function.   3. Normal left ventricular internal cavity size.   4. Spectral Doppler shows impaired relaxation pattern of left   ventricular myocardialfilling (Grade I diastolic dysfunction).   5. Mildly enlarged right ventricle.   6. Mild thickening of the anterior and posterior mitral valve leaflets.   7. No evidence of mitral valve regurgitation.   8. Sclerotic aortic valve with normal opening.   9. LA volume Index is 19.2 ml/m² ml/m2.    [id number] Carlos Jackman MD, FACC , MD, FACC Electronically   signed on 2024 at 1:54:42 PM    < end of copied text >    Imaging from Last 24 Hours:    Electrocardiogram/QTc Interval:    COORDINATION OF CARE:  Care Discussed with Consultants/Other Providers:    < from: CT Angio Neck Stroke Protocol w/ IV Cont (24 @ 21:02) >  IMPRESSION:    CT HEAD:    No acute intracranial hemorrhage. Compared to 3/1/24 there is evolution   of left sided infarcts seen on MRI at that time. Site of acute of infarct   on today's MRI is uncertain.  Chronic infarct sites right anterior   temporal lobe and those in the frieda appear stable.    CT PERFUSION:    Elevated transit time in left MCA inferior division.    CTA INTRACRANIAL:    No arterial occlusion.    Severe/flow-limiting stenosis of left middle cerebral artery at inferior   M2 trunk origin. In retrospect this is similar to 3/1/2024.    Milder stenosis of left M1 segment, mild-moderate stenosis of both   cavernous internal carotid arteries and right PCA.    CTA EXTRACRANIAL:    No arterial occlusion or hemodynamically significant stenosis.    New groundglass opacity in the left upper lung. Correlate clinically for   pneumonia.    < end of copied text >

## 2024-05-24 NOTE — OCCUPATIONAL THERAPY INITIAL EVALUATION ADULT - MD ORDER
MD orders received. Chart reviewed, contents noted. RN cleared for OT IE. IE completed. Please see below for findings.

## 2024-05-24 NOTE — PROGRESS NOTE ADULT - NUTRITIONAL ASSESSMENT
This patient has been assessed with a concern for Malnutrition and has been determined to have a diagnosis/diagnoses of Severe protein-calorie malnutrition.    This patient is being managed with:   Diet Consistent Carbohydrate w/Evening Snack-  Soft and Bite Sized ([id number])  Supplement Feeding Modality:  Oral  Glucerna Shake Cans or Servings Per Day:  1       Frequency:  Two Times a day  Entered: May 24 2024 10:55AM

## 2024-05-24 NOTE — OCCUPATIONAL THERAPY INITIAL EVALUATION ADULT - PLANNED THERAPY INTERVENTIONS, OT EVAL
ADL retraining/IADL retraining/balance training/motor coordination training/strengthening/transfer training

## 2024-05-24 NOTE — DISCHARGE NOTE PROVIDER - HOSPITAL COURSE
64 y/o F with PMH of HTN, T2DM,  h/o Left MCA stroke in Oct 2023 with right facial droop and right sided hemiparesis, referred by her radiologist after MRI of brain done showed new stroke. As per patient's partner Connie, pt was complaining of right leg weakness and pain for past 2 days. Of note, this was her 6 month f/u MRI , which found that she has new acute CVA in left MCA territory. pt was brought to the ED.     #L MCA stroke; new , while on DAPT  # Recurrent CVA  # Severe Left MCA flow limiting stenosis  - CT Head - No acute intracranial hemorrhage. Compared to 3/1/24 there is evolution of left sided infarcts seen on MRI at that time. Site of acute of infarct on today's MRI is uncertain.  Chronic infarct sites right anterior temporal lobe and those in the frieda appear stable  - MRI head - Acute left MCA infarct as above. Chronic infarct in the right anterior temporal lobe, bilateral basal ganglia and frieda as above. Extensive T2/FLAIR hyperintense signal in the periventricular white matter, pontine white matter, and bilateral brachium pontis white matter, compatible with advanced chronic microvascular ischemic changes.  - CTA: Severe/flow-limiting stenosis of left middle cerebral artery at inferior M2 trunk origin. In retrospect this is similar to 3/1/2024.  - Dr. Russell [ neuro] spoke to Stroke team at [facility]. suggested to transfer to stroke floor in [facility] for vascular neuro intervention as hazel got new stroke while on DAPT. Patient  and Connie agreed for the transfer  - neuro checks Q4 hr   - BP meds was held on admission to enhance cerebral perfusion. resume when feasible to keep BP _100-160.  - c/w ASA and plavix   - continue Aricept  - continue high intensity statin   - echo: normal EF. Grade 1 DD  - PT rec acute rehab   - SLP rec--> soft with thin  - speech language eval noted   - PMR favors acute rehab  - Neurology appreciated    #Pain of right heel  #Pain of right wrist   - XR right heel/ right wrist : There is a fracture of the fifth right metatarsal which could be old. Question dehydration in the lungs.   - pain management   - Tizanidine 2 mg Q8hrs PRN for stiffness    #HTN  - BP target: 100-160  - resume lisinopril and amlodipine gradually  - avoid BP fluctuation/hypotention    #DM2   - A1c 5.8  - ISS qAC/HS and bedtime   - Hold metformin while patient is admitted to the hospital    # Reported vaginal bleeding  - no active bleeding reported. trace blood was seen on pad at periphery  - SC UA neg ofr blood or infection  - Transvaginal sono done, result pending.  - hb stable  - informed patent's partner that patient needs to see gyn OP    # Tachy-bradycardia  -  HR 55 at rest, goes to 150s with small walk to restroom  - mutiple stroke  - TTE reviewed: EF normal. grade 1DD. no WMA. mildly enlarged RV  - check EKG 12 lead  - TSH normal on march 2024. slightly low on may. will repeat full panel in am  - monitor electrolytes. keep K>4, Mg>2  - cardio eval noted. continue to monitor on tele while inhouse. will need ILR. can be done OP    # Severe protein-calorie malnutrition.  - nutrition on board    #DVTppx  - enoxaparin    #GOC full code     Dispo: medically cleared for DC to [facility] for vascular intervention.     discharging provider: Nini Johnson MD. cell: [phone]. call with Qs 64 y/o F with PMH of HTN, T2DM,  h/o Left MCA stroke in Oct 2023 with right facial droop and right sided hemiparesis, referred by her radiologist after MRI of brain done showed new stroke. As per patient's partner Connie, pt was complaining of right leg weakness and pain for past 2 days. Of note, this was her 6 month f/u MRI , which found that she has new acute CVA in left MCA territory. pt was brought to the ED.     #L MCA stroke; new , while on DAPT  # Recurrent CVA  # Severe Left MCA flow limiting stenosis  - CT Head - No acute intracranial hemorrhage. Compared to 3/1/24 there is evolution of left sided infarcts seen on MRI at that time. Site of acute of infarct on today's MRI is uncertain.  Chronic infarct sites right anterior temporal lobe and those in the frieda appear stable  - MRI head - Acute left MCA infarct as above. Chronic infarct in the right anterior temporal lobe, bilateral basal ganglia and frieda as above. Extensive T2/FLAIR hyperintense signal in the periventricular white matter, pontine white matter, and bilateral brachium pontis white matter, compatible with advanced chronic microvascular ischemic changes.  - CTA: Severe/flow-limiting stenosis of left middle cerebral artery at inferior M2 trunk origin. In retrospect this is similar to 3/1/2024.  - Dr. Russell [ neuro] spoke to Stroke team at [facility]. suggested to transfer to stroke floor in [facility] for vascular neuro intervention as hazel got new stroke while on DAPT. Patient  and Connie agreed for the transfer  - neuro checks Q4 hr   - BP meds was held on admission to enhance cerebral perfusion. resume when feasible to keep BP _100-160.  - c/w ASA and plavix   - continue Aricept  - continue high intensity statin   - echo: normal EF. Grade 1 DD  - PT rec acute rehab   - SLP rec--> soft with thin  - speech language eval noted   - PMR favors acute rehab  - Neurology appreciated    #Pain of right heel  #Pain of right wrist   - XR right heel/ right wrist : There is a fracture of the fifth right metatarsal which could be old. Question dehydration in the lungs.   - pain management   - Tizanidine 2 mg Q8hrs PRN for stiffness    #HTN  - BP target: 100-160  - resume lisinopril and amlodipine gradually  - avoid BP fluctuation/hypotention    #DM2   - A1c 5.8  - ISS qAC/HS and bedtime   - Hold metformin while patient is admitted to the hospital    # Reported vaginal bleeding  - no active bleeding reported. trace blood was seen on pad at periphery  - SC UA neg ofr blood or infection  - Transvaginal sono done, result pending.  - hb stable  - informed patent's partner that patient needs to see gyn OP    # Tachy-bradycardia  -  HR 55 at rest, goes to 150s with small walk to restroom  - mutiple stroke  - TTE reviewed: EF normal. grade 1DD. no WMA. mildly enlarged RV  - check EKG 12 lead  - TSH normal on march 2024. slightly low on may. will repeat full panel in am  - monitor electrolytes. keep K>4, Mg>2  - cardio eval noted. continue to monitor on tele while inhouse. will need ILR. can be done OP    # Severe protein-calorie malnutrition.  - nutrition on board    #DVTppx  - enoxaparin    #GOC full code     Dispo: medically cleared for DC to [facility] for vascular intervention. however, later Connie [ patient's partner] wanted her to be transferred to [facility]. She spoke to Dr.Marosfoi Foy at [facility]. [phone]. She got Accepting MD  [phone]. patient will be discharged to [facility] when transportation is arranged    discharging provider: Nini Johnson MD. cell: [phone]. call with Qs  accepting MD at [facility]: Dr.Richard Adrian.   Accepting MD at [facility]:   [phone]. 64 y/o F with PMH of HTN, T2DM,  h/o Left MCA stroke in Oct 2023 with right facial droop and right sided hemiparesis, referred by her radiologist after MRI of brain done showed new stroke. As per patient's partner Connie, pt was complaining of right leg weakness and pain for past 2 days. Of note, this was her 6 month f/u MRI , which found that she has new acute CVA in left MCA territory. pt was brought to the ED.     #L MCA stroke; new , while on DAPT  # Recurrent CVA  # Severe Left MCA flow limiting stenosis  - CT Head - No acute intracranial hemorrhage. Compared to 3/1/24 there is evolution of left sided infarcts seen on MRI at that time. Site of acute of infarct on today's MRI is uncertain.  Chronic infarct sites right anterior temporal lobe and those in the frieda appear stable  - MRI head - Acute left MCA infarct as above. Chronic infarct in the right anterior temporal lobe, bilateral basal ganglia and frieda as above. Extensive T2/FLAIR hyperintense signal in the periventricular white matter, pontine white matter, and bilateral brachium pontis white matter, compatible with advanced chronic microvascular ischemic changes.  - CTA: Severe/flow-limiting stenosis of left middle cerebral artery at inferior M2 trunk origin. In retrospect this is similar to 3/1/2024.  - Dr. Russell [ neuro] spoke to Stroke team at [facility]. suggested to transfer to stroke floor in [facility] for vascular neuro intervention as hazel got new stroke while on DAPT. Patient  and Cnonie agreed for the transfer  - neuro checks Q4 hr   - BP meds was held on admission to enhance cerebral perfusion. resume when feasible to keep BP _100-160.  - c/w ASA and plavix   - continue Aricept  - continue high intensity statin   - echo: normal EF. Grade 1 DD  - PT rec acute rehab   - SLP rec--> soft with thin  - speech language eval noted   - PMR favors acute rehab  - Neurology appreciated    #Pain of right heel  #Pain of right wrist   - XR right heel/ right wrist : There is a fracture of the fifth right metatarsal which could be old. Question dehydration in the lungs. informed Connie  - pain management   - Tizanidine 2 mg Q8hrs PRN for stiffness    #HTN  - BP target: 100-160  - resume lisinopril and amlodipine gradually  - avoid BP fluctuation/hypotention    #DM2   - A1c 5.8  - ISS qAC/HS and bedtime   - Hold metformin while patient is admitted to the hospital    # Reported vaginal bleeding  - no active bleeding reported. trace blood was seen on pad at periphery  - SC UA neg ofr blood or infection  - Transvaginal sono done, result pending.  - hb stable  - informed patent's partner that patient needs to see gyn OP    # Tachy-bradycardia  -  HR 55 at rest, goes to 150s with small walk to restroom  - mutiple stroke  - TTE reviewed: EF normal. grade 1DD. no WMA. mildly enlarged RV  - check EKG 12 lead  - TSH normal on march 2024. slightly low on may. will repeat full panel in am  - monitor electrolytes. keep K>4, Mg>2  - cardio eval noted. continue to monitor on tele while inhouse. will need ILR. can be done OP    # Severe protein-calorie malnutrition.  - nutrition on board    #DVTppx  - enoxaparin    #GOC full code     Dispo: medically cleared for DC to [facility] for vascular intervention. however, later Connie [ patient's partner] wanted her to be transferred to [facility]. She spoke to Dr.Marosfoi Foy at [facility]. [phone]. She got Accepting MD  [phone]. patient will be discharged to [facility] when transportation is arranged    discharging provider: Nini Johnson MD. cell: [phone]. call with Qs  accepting MD at [facility]: Dr.Richard Adrian.   Accepting MD at [facility]:   [phone].

## 2024-05-24 NOTE — DISCHARGE NOTE NURSING/CASE MANAGEMENT/SOCIAL WORK - PATIENT PORTAL LINK FT
You can access the FollowMyHealth Patient Portal offered by [facility] by registering at the following website: [email or web address]. By joining [employer]’s FollowMyHealth portal, you will also be able to view your health information using other applications (apps) compatible with our system.

## 2024-05-24 NOTE — DISCHARGE NOTE PROVIDER - DETAILS OF MALNUTRITION DIAGNOSIS/DIAGNOSES
This patient has been assessed with a concern for Malnutrition and was treated during this hospitalization for the following Nutrition diagnosis/diagnoses:     -  05/23/2024: Severe protein-calorie malnutrition

## 2024-05-24 NOTE — CHART NOTE - NSCHARTNOTEFT_GEN_A_CORE
Contacted by RN    ***in progress** Contacted by RN. Per partner who cares for patient at home had noticed vaginal bleeding while wiping her post micturition. Reports patient bends and moans when peeing. Denied hematuria, hematochezia, melena, abdominal pain, boating, fatigue, SOB. Reports 3 bm today.  LMP around 54 yo. Also reported since march some redness around inguinal area possible from diaper, uses cream but has not seen rashes in area.   PE: evaluated anus and vaginal area (chaperone: PAULINO Giles and Connie). No bleeding noted on vagina, introitus, anus. On diaper minimal-scant bloody spot marked mostly in the periphery  Plan: CBC, T&S, Pelvic US.   Contacted  radiology. As patient with urinary incontinence, pelvic US challenging. May attempt hydration in am and perform test then. Agreed, as CBC stable

## 2024-05-24 NOTE — CHART NOTE - NSCHARTNOTEFT_GEN_A_CORE
Called by Nurse stating pt's significant other Connie wants to cancel transfer to [facility].   I spoke with Connie in person and she is definite about cancelling transfer to [facility].  She states she wants patient to go to [facility] and she has accepting MD.  I explained to her that she and the accepting MD will need to arrange the transfer since it is not a [facility].  She fully understands and she will be arranging on her own.  Dr. Johnson, Hospitalist notified and she has cancelled transfer.

## 2024-05-24 NOTE — SWALLOW BEDSIDE ASSESSMENT ADULT - SWALLOW EVAL: DIAGNOSIS
Pt presents with moderate oral dysphagia marked by right labial stasis and loss of bolus after the swallow due to residue and reduced labial seal. Adequate oral containment noted for liquids with use of straw. Pt noted with reduced bite/ tear and increased but grossly mastication time for solids due to poor dentition as well as right sulcus residue due to right sided weakness. Overall, pt is able to tolerate solids and thin liquids with no overt behavioral signs/symptoms of penetration or aspiration. Recommend soft and bite sized due to reduced dentition and oral weakness.

## 2024-05-24 NOTE — SWALLOW BEDSIDE ASSESSMENT ADULT - ORAL PREPARATORY PHASE
reduced bite/tear/Decreased mastication ability/Bolus falls into right lateral sulci Within functional limits

## 2024-05-24 NOTE — OCCUPATIONAL THERAPY INITIAL EVALUATION ADULT - GENERAL OBSERVATIONS, REHAB EVAL
Pt. received seated in transport w/c bedside with partner +PIV. Pt. tolerated OT IE well. Transferring with moderate assist. VSS t/o. Pt. left supine in bed +bed rails +call bell. Partner present bedside. Handed off to RN.

## 2024-05-24 NOTE — SWALLOW BEDSIDE ASSESSMENT ADULT - ORAL PHASE
Decreased anterior-posterior movement of the bolus/Delayed oral transit time/Stasis in lateral sulci/Lingual stasis Within functional limits

## 2024-05-24 NOTE — PROGRESS NOTE ADULT - ASSESSMENT
62 y/o F with PMH of HTN, T2DM,  h/o Left MCA stroke in Oct 2023 with right facial droop and right sided hemiparesis, referred by her radiologist after MRI of brain done showed new stroke. As per patient's partner Connie, pt was complaining of right leg weakness and pain for past 2 days. Of note, this was her 6 month f/u MRI , which found that she has new acute CVA in left MCA territory. pt was brought to the ED.     #L MCA stroke; new   - CT Head - No acute intracranial hemorrhage. Compared to 3/1/24 there is evolution of left sided infarcts seen on MRI at that time. Site of acute of infarct on today's MRI is uncertain.  Chronic infarct sites right anterior temporal lobe and those in the frieda appear stable  - MRI head - Acute left MCA infarct as above. Chronic infarct in the right anterior temporal lobe, bilateral basal ganglia and frieda as above. Extensive T2/FLAIR hyperintense signal in the periventricular white matter, pontine white matter, and bilateral brachium pontis white matter, compatible with advanced chronic microvascular ischemic changes.  - No acute interventions  - neuro checks Q4 hr   - BP meds was held on admission to enhance cerebral perfusion. resume when feasible to keep BP _100-160.  - c/w ASA and plavix   - continue Aricept  - continue high intensity statin   - echo: normal EF. Grade 1 DD  - PT rec acute rehab   - SLP rec--> soft with thin  - speech language evalnoted   - PMR favors acute rehab  - Neurology consulted     #Pain of right heel  #Pain of right wrist   - XR right heel pending result  - XR right wrist pending result  - pain management   - Tizanidine 2 mg Q8hrs PRN for stiffness    #HTN  - BP target: 100-160  - resume lisinopril and amlodipine gradually  - avoid BP fluctuation/hypotention    #DM2  - ISS qAC/HS and bedtime   - Hold metformin while patient is admitted to the hospital    # Reported vaginal bleeding  - no active bleeding reported. trace blood was seen on pad at periphery  - SC UA neg ofr blood or infection  - Transvaginal sono done, result pending.  - hb stable  - informed patent's partner that patient needs to see gyn OP    # Severe protein-calorie malnutrition.  - nutrition on board    #DVTppx  - enoxaparin    #GOC full code     Dispo: medically cleared for DC to Acute rehab. Pending Auth    5/24 Family and patient updated at bedside                  62 y/o F with PMH of HTN, T2DM,  h/o Left MCA stroke in Oct 2023 with right facial droop and right sided hemiparesis, referred by her radiologist after MRI of brain done showed new stroke. As per patient's partner Connie, pt was complaining of right leg weakness and pain for past 2 days. Of note, this was her 6 month f/u MRI , which found that she has new acute CVA in left MCA territory. pt was brought to the ED.     #L MCA stroke; new   - CT Head - No acute intracranial hemorrhage. Compared to 3/1/24 there is evolution of left sided infarcts seen on MRI at that time. Site of acute of infarct on today's MRI is uncertain.  Chronic infarct sites right anterior temporal lobe and those in the frieda appear stable  - MRI head - Acute left MCA infarct as above. Chronic infarct in the right anterior temporal lobe, bilateral basal ganglia and frieda as above. Extensive T2/FLAIR hyperintense signal in the periventricular white matter, pontine white matter, and bilateral brachium pontis white matter, compatible with advanced chronic microvascular ischemic changes.  - No acute interventions  - neuro checks Q4 hr   - BP meds was held on admission to enhance cerebral perfusion. resume when feasible to keep BP _100-160.  - c/w ASA and plavix   - continue Aricept  - continue high intensity statin   - echo: normal EF. Grade 1 DD  - PT rec acute rehab   - SLP rec--> soft with thin  - speech language eval noted   - PMR favors acute rehab  - Neurology consulted     #Pain of right heel  #Pain of right wrist   - XR right heel pending result  - XR right wrist pending result  - pain management   - Tizanidine 2 mg Q8hrs PRN for stiffness    #HTN  - BP target: 100-160  - resume lisinopril and amlodipine gradually  - avoid BP fluctuation/hypotention    #DM2   - A1c 5.8  - ISS qAC/HS and bedtime   - Hold metformin while patient is admitted to the hospital    # Reported vaginal bleeding  - no active bleeding reported. trace blood was seen on pad at periphery  - SC UA neg ofr blood or infection  - Transvaginal sono done, result pending.  - hb stable  - informed patent's partner that patient needs to see gyn OP    # Tachy-bradycardia  -  HR 55 at rest, goes to 150s with small walk to restroom  - mutiple stroke  - TTE reviewed: EF normal. grade 1DD. no WMA. mildly enlarged RV  - check EKG 12 lead  - TSH normal on march 2024. slightly low on may. will repeat full panel in am  - monitor electrolytes. keep K>4, Mg>2  - cardio eval. informed . possibly be needs long term cardiac monitor    # Severe protein-calorie malnutrition.  - nutrition on board    #DVTppx  - enoxaparin    #GOC full code     Dispo: medically cleared for DC to Acute rehab. Pending Auth    5/24 Family and patient updated at bedside                      64 y/o F with PMH of HTN, T2DM,  h/o Left MCA stroke in Oct 2023 with right facial droop and right sided hemiparesis, referred by her radiologist after MRI of brain done showed new stroke. As per patient's partner Connie, pt was complaining of right leg weakness and pain for past 2 days. Of note, this was her 6 month f/u MRI , which found that she has new acute CVA in left MCA territory. pt was brought to the ED.     #L MCA stroke; new   - CT Head - No acute intracranial hemorrhage. Compared to 3/1/24 there is evolution of left sided infarcts seen on MRI at that time. Site of acute of infarct on today's MRI is uncertain.  Chronic infarct sites right anterior temporal lobe and those in the frieda appear stable  - MRI head - Acute left MCA infarct as above. Chronic infarct in the right anterior temporal lobe, bilateral basal ganglia and frieda as above. Extensive T2/FLAIR hyperintense signal in the periventricular white matter, pontine white matter, and bilateral brachium pontis white matter, compatible with advanced chronic microvascular ischemic changes.  - CTA: Severe/flow-limiting stenosis of left middle cerebral artery at inferior M2 trunk origin. In retrospect this is similar to 3/1/2024.  - Dr. Russell [ neuro] spoke to Stroke team at [facility]. suggested to transfer to stroke floor in [facility] for vascular neuro intervention as hazel got new stroke while on [facility]. Patient  and Connie agreed for the transfer  - neuro checks Q4 hr   - BP meds was held on admission to enhance cerebral perfusion. resume when feasible to keep BP _100-160.  - c/w ASA and plavix   - continue Aricept  - continue high intensity statin   - echo: normal EF. Grade 1 DD  - PT rec acute rehab   - SLP rec--> soft with thin  - speech language eval noted   - PMR favors acute rehab  - Neurology appreciated    #Pain of right heel  #Pain of right wrist   - XR right heel/ right wrist : There is a fracture of the fifth right metatarsal which could be old. Question dehydration in the lungs.   - pain management   - Tizanidine 2 mg Q8hrs PRN for stiffness    #HTN  - BP target: 100-160  - resume lisinopril and amlodipine gradually  - avoid BP fluctuation/hypotention    #DM2   - A1c 5.8  - ISS qAC/HS and bedtime   - Hold metformin while patient is admitted to the hospital    # Reported vaginal bleeding  - no active bleeding reported. trace blood was seen on pad at periphery  - SC UA neg ofr blood or infection  - Transvaginal sono done, result pending.  - hb stable  - informed patent's partner that patient needs to see gyn OP    # Tachy-bradycardia  -  HR 55 at rest, goes to 150s with small walk to restroom  - mutiple stroke  - TTE reviewed: EF normal. grade 1DD. no WMA. mildly enlarged RV  - check EKG 12 lead  - TSH normal on march 2024. slightly low on may. will repeat full panel in am  - monitor electrolytes. keep K>4, Mg>2  - cardio eval noted. continue to monitor on tele while inhouse. will need ILR. can be done OP    # Severe protein-calorie malnutrition.  - nutrition on board    #DVTppx  - enoxaparin    #GOC full code     Dispo: medically cleared for DC to [facility] for vascular intervention. CTC was informed. Accepting MD: Dr. Stevie Adrian    5/24 Family and patient updated at bedside

## 2024-05-24 NOTE — OCCUPATIONAL THERAPY INITIAL EVALUATION ADULT - ADDITIONAL COMMENTS
Pt. partner reports they live in a town house together with 3STE. ~13 steps inside with bed/bath located on second level. Tub shower +tub bench. Prior to CVA (February 28) Pt. partner reports pt. was functioning independently with ADLs/ambulation without device. After CVA in February pt. required assist with ADLs/ ambulation. DME owned: RW, straight cane, tub bench, transport w/c.

## 2024-05-24 NOTE — SPEECH LANGUAGE PATHOLOGY EVALUATION - COMMENTS
Requires cues to allow increased processing time and breakdown noted at the semi-complex sentence level.

## 2024-05-24 NOTE — CONSULT NOTE ADULT - SUBJECTIVE AND OBJECTIVE BOX
MAY BURNETT  [id number]      HPI:  64 y/o F with PMH of HTN, T2DM,  h/o Left MCA stroke in Oct 2023 with right facial droop and right sided hemiparesis, referred by her radiologist after MRI of brain done today showed new stroke. As per patient's partner Connie, pt was complaining of right leg weakness and alen. Of note, this was her 6 month f/u MRI , found to have new acute CVA in left MCA territory.     ALLERGIES:  Allergy Status Unknown      PAST MEDICAL & SURGICAL HISTORY:  HTN (hypertension)      DM (diabetes mellitus)      CVA (cerebrovascular accident)      No significant past surgical history            CURRENT MEDICATIONS:  MEDICATIONS  (STANDING):  aspirin enteric coated 81 milliGRAM(s) Oral daily  atorvastatin 80 milliGRAM(s) Oral at bedtime  clopidogrel Tablet 75 milliGRAM(s) Oral daily  dextrose 10% Bolus 125 milliLiter(s) IV Bolus once  dextrose 5%. 1000 milliLiter(s) (50 mL/Hr) IV Continuous <Continuous>  dextrose 5%. 1000 milliLiter(s) (100 mL/Hr) IV Continuous <Continuous>  dextrose 50% Injectable 25 Gram(s) IV Push once  dextrose 50% Injectable 12.5 Gram(s) IV Push once  donepezil 5 milliGRAM(s) Oral at bedtime  enoxaparin Injectable 40 milliGRAM(s) SubCutaneous every 24 hours  glucagon  Injectable 1 milliGRAM(s) IntraMuscular once  insulin lispro (ADMELOG) corrective regimen sliding scale   SubCutaneous at bedtime  insulin lispro (ADMELOG) corrective regimen sliding scale   SubCutaneous three times a day before meals  multivitamin 1 Tablet(s) Oral daily    MEDICATIONS  (PRN):  dextrose Oral Gel 15 Gram(s) Oral once PRN Blood Glucose LESS THAN 70 milliGRAM(s)/deciliter  melatonin 3 milliGRAM(s) Oral at bedtime PRN Insomnia  senna 2 Tablet(s) Oral at bedtime PRN Constipation  tiZANidine 2 milliGRAM(s) Oral every 8 hours PRN Muscle Spasm      SOCIAL HISTORY:  denies    FAMILY HISTORY:  denies    ROS:  All 10 systems reviewed and positives noted in HPI    OBJECTIVE:    VITAL SIGNS:  Vital Signs Last 24 Hrs  T(C): 36.5 (24 May 2024 06:30), Max: 36.7 (23 May 2024 20:20)  T(F): 97.7 (24 May 2024 06:30), Max: 98.1 (24 May 2024 00:38)  HR: 59 (24 May 2024 06:30) (54 - 59)  BP: 154/78 (24 May 2024 06:30) (133/63 - 154/78)  BP(mean): --  RR: 17 (24 May 2024 06:30) (17 - 20)  SpO2: 100% (24 May 2024 06:30) (99% - 100%)    Parameters below as of 24 May 2024 06:30  Patient On (Oxygen Delivery Method): room air        PHYSICAL EXAM:  General:  no distress  HEENT: sclera anicteric  Neck: supple, no carotid bruits b/l  CVS: JVP ~ 7 cm H20, RRR, s1, s2, no murmurs/rubs/gallops  Chest: unlabored respirations, clear to auscultation b/l  Abdomen: non-distended  Extremities: no lower extremity edema b/l  Neuro: aphasic    LABS:                        11.3   10.78 )-----------( 253      ( 24 May 2024 09:09 )             36.5     05-23    145  |  107  |  24<H>  ----------------------------<  93  3.9   |  32<H>  |  0.70    Ca    9.0      23 May 2024 06:07    TPro  6.9  /  Alb  3.4  /  TBili  0.3  /  DBili  x   /  AST  13  /  ALT  23  /  AlkPhos  74  05-22        PT/INR - ( 22 May 2024 20:48 )   PT: 11.2 sec;   INR: 0.98 ratio         PTT - ( 22 May 2024 20:48 )  PTT:30.7 sec      ECG: Normal Sinus Rhythm      TTE:  < from: TTE Echo Complete w/o Contrast w/ Doppler (05.23.24 @ 09:20) >   1. Left ventricular ejection fraction, by visual estimation, is 60 to 65%.   2. Normal global left ventricular systolic function.   3. Normal left ventricular internal cavity size.   4. Spectral Doppler shows impaired relaxation pattern of left   ventricular myocardialfilling (Grade I diastolic dysfunction).   5. Mildly enlarged right ventricle.   6. Mild thickening of the anterior and posterior mitral valve leaflets.   7. No evidence of mitral valve regurgitation.   8. Sclerotic aortic valve with normal opening.   9. LA volume Index is 19.2 ml/m² ml/m2.         MAY BURNETT  [id number]      HPI:  62 y/o F with PMH of HTN, T2DM,  h/o Left MCA stroke in Oct 2023 with right facial droop and right sided hemiparesis, referred by her radiologist after MRI of brain done today showed new stroke. As per patient's partner Connie, pt was complaining of right leg weakness and alen. Of note, this was her 6 month f/u MRI , found to have new acute CVA in left MCA territory.     ALLERGIES:  Allergy Status Unknown      PAST MEDICAL & SURGICAL HISTORY:  HTN (hypertension)      DM (diabetes mellitus)      CVA (cerebrovascular accident)      No significant past surgical history    Home Medications:  amLODIPine 10 mg oral tablet: 1 tab(s) orally once a day (23 May 2024 05:09)  aspirin 81 mg oral delayed release tablet: 1 tab(s) orally once a day (23 May 2024 05:09)  atorvastatin 80 mg oral tablet: 1 tab(s) orally once a day (at bedtime) (23 May 2024 05:09)  bisacodyl 10 mg rectal suppository: 1 suppository(ies) rectal once as needed for  constipation (23 May 2024 05:09)  clopidogrel 75 mg oral tablet: 1 tab(s) orally once a day (23 May 2024 05:09)  donepezil 5 mg oral tablet: 1 tab(s) orally once a day (23 May 2024 05:09)  lisinopril 20 mg oral tablet: 1 tab(s) orally once a day (23 May 2024 05:09)  melatonin 3 mg oral tablet: 1 tab(s) orally once a day (at bedtime) As needed Insomnia (23 May 2024 05:09)  metFORMIN 500 mg oral tablet: 1 tab(s) orally 2 times a day (with meals) (23 May 2024 05:09)  Multiple Vitamins oral tablet: 1 tab(s) orally once a day (23 May 2024 05:09)  petrolatum topical ointment: 1 Apply topically to affected area 3 times a day (23 May 2024 05:09)  polyethylene glycol 3350 oral powder for reconstitution: 17 gram(s) orally 2 times a day (23 May 2024 05:09)  potassium chloride 20 mEq oral powder for reconstitution: 1 packet(s) orally once a day (23 May 2024 05:09)  senna leaf extract oral tablet: 2 tab(s) orally once a day (at bedtime) (23 May 2024 05:09)      MEDICATIONS  (STANDING):  aspirin enteric coated 81 milliGRAM(s) Oral daily  atorvastatin 80 milliGRAM(s) Oral at bedtime  clopidogrel Tablet 75 milliGRAM(s) Oral daily  dextrose 10% Bolus 125 milliLiter(s) IV Bolus once  dextrose 5%. 1000 milliLiter(s) (50 mL/Hr) IV Continuous <Continuous>  dextrose 5%. 1000 milliLiter(s) (100 mL/Hr) IV Continuous <Continuous>  dextrose 50% Injectable 25 Gram(s) IV Push once  dextrose 50% Injectable 12.5 Gram(s) IV Push once  donepezil 5 milliGRAM(s) Oral at bedtime  enoxaparin Injectable 40 milliGRAM(s) SubCutaneous every 24 hours  glucagon  Injectable 1 milliGRAM(s) IntraMuscular once  insulin lispro (ADMELOG) corrective regimen sliding scale   SubCutaneous three times a day before meals  insulin lispro (ADMELOG) corrective regimen sliding scale   SubCutaneous at bedtime  multivitamin 1 Tablet(s) Oral daily    MEDICATIONS  (PRN):  dextrose Oral Gel 15 Gram(s) Oral once PRN Blood Glucose LESS THAN 70 milliGRAM(s)/deciliter  melatonin 3 milliGRAM(s) Oral at bedtime PRN Insomnia  senna 2 Tablet(s) Oral at bedtime PRN Constipation  tiZANidine 2 milliGRAM(s) Oral every 8 hours PRN Muscle Spasm      MEDICATIONS  (PRN):  dextrose Oral Gel 15 Gram(s) Oral once PRN Blood Glucose LESS THAN 70 milliGRAM(s)/deciliter  melatonin 3 milliGRAM(s) Oral at bedtime PRN Insomnia  senna 2 Tablet(s) Oral at bedtime PRN Constipation  tiZANidine 2 milliGRAM(s) Oral every 8 hours PRN Muscle Spasm      SOCIAL HISTORY:  denies    FAMILY HISTORY:  denies    ROS:  All 10 systems reviewed and positives noted in HPI    OBJECTIVE:    VITAL SIGNS:  Vital Signs Last 24 Hrs  T(C): 36.5 (24 May 2024 06:30), Max: 36.7 (23 May 2024 20:20)  T(F): 97.7 (24 May 2024 06:30), Max: 98.1 (24 May 2024 00:38)  HR: 59 (24 May 2024 06:30) (54 - 59)  BP: 154/78 (24 May 2024 06:30) (133/63 - 154/78)  BP(mean): --  RR: 17 (24 May 2024 06:30) (17 - 20)  SpO2: 100% (24 May 2024 06:30) (99% - 100%)    Parameters below as of 24 May 2024 06:30  Patient On (Oxygen Delivery Method): room air        PHYSICAL EXAM:  General:  no distress  HEENT: sclera anicteric  Neck: supple, no carotid bruits b/l  CVS: JVP ~ 7 cm H20, RRR, s1, s2, no murmurs/rubs/gallops  Chest: unlabored respirations, clear to auscultation b/l  Abdomen: non-distended  Extremities: no lower extremity edema b/l  Neuro: aphasic    LABS:                        11.3   10.78 )-----------( 253      ( 24 May 2024 09:09 )             36.5     05-23    145  |  107  |  24<H>  ----------------------------<  93  3.9   |  32<H>  |  0.70    Ca    9.0      23 May 2024 06:07    TPro  6.9  /  Alb  3.4  /  TBili  0.3  /  DBili  x   /  AST  13  /  ALT  23  /  AlkPhos  74  05-22        PT/INR - ( 22 May 2024 20:48 )   PT: 11.2 sec;   INR: 0.98 ratio         PTT - ( 22 May 2024 20:48 )  PTT:30.7 sec      ECG: Normal Sinus Rhythm      TTE:  < from: TTE Echo Complete w/o Contrast w/ Doppler (05.23.24 @ 09:20) >   1. Left ventricular ejection fraction, by visual estimation, is 60 to 65%.   2. Normal global left ventricular systolic function.   3. Normal left ventricular internal cavity size.   4. Spectral Doppler shows impaired relaxation pattern of left   ventricular myocardialfilling (Grade I diastolic dysfunction).   5. Mildly enlarged right ventricle.   6. Mild thickening of the anterior and posterior mitral valve leaflets.   7. No evidence of mitral valve regurgitation.   8. Sclerotic aortic valve with normal opening.   9. LA volume Index is 19.2 ml/m² ml/m2.

## 2024-05-24 NOTE — DISCHARGE NOTE PROVIDER - NSDCCPCAREPLAN_GEN_ALL_CORE_FT
PRINCIPAL DISCHARGE DIAGNOSIS  Diagnosis: CVA (cerebral vascular accident)  Assessment and Plan of Treatment: you were found to have recurrent stroke. CT angio brain reported bad narrowing limiting blood flow which could be contributing. stroke team in [facility] suggested  that this needs to be fixed to prevent furhter stroke. Dr. Stevie Adrian [ stroke neurologist] accepted you there. continue meds. fall, aspiration, seizure precautions.      SECONDARY DISCHARGE DIAGNOSES  Diagnosis: Tachy-ashley syndrome  Assessment and Plan of Treatment: your heart rate at rest around 55 beats per min. goes upto 150s when you walked short distance. heart doctor saw you. suggested long term cardiac monitor called ILR. you can speak to heart doctor at [facility] and have one there. discuss with your neurology team    Diagnosis: DM (diabetes mellitus)  Assessment and Plan of Treatment: we held your home meds. resume on discharge    Diagnosis: HTN (hypertension)  Assessment and Plan of Treatment: we held your medicine for stroke. your stroke team will monitor blood pressure and resume meds if needed     PRINCIPAL DISCHARGE DIAGNOSIS  Diagnosis: CVA (cerebral vascular accident)  Assessment and Plan of Treatment: you were found to have recurrent stroke. CT angio brain reported bad narrowing limiting blood flow which could be contributing. stroke team in [facility] suggested  that this needs to be fixed to prevent furhter stroke. Dr. Stevie Adrian [ stroke neurologist] accepted you there. continue meds. fall, aspiration, seizure precautions.      SECONDARY DISCHARGE DIAGNOSES  Diagnosis: Tachy-ashley syndrome  Assessment and Plan of Treatment: your heart rate at rest around 55 beats per min. goes upto 150s when you walked short distance. heart doctor saw you. suggested long term cardiac monitor called ILR. you can speak to heart doctor at [facility] and have one there. discuss with your neurology team    Diagnosis: DM (diabetes mellitus)  Assessment and Plan of Treatment: we held your home meds. resume on discharge    Diagnosis: HTN (hypertension)  Assessment and Plan of Treatment: we held your medicine for stroke. your stroke team will monitor blood pressure and resume meds if needed    Diagnosis: Vaginal bleeding, abnormal  Assessment and Plan of Treatment: vaginal ultrasound was limited. reported myomatous uterus. radiologist recommended further study if indictaed like hysteroscopy or MRI. please consult your GYN doctor. .    Diagnosis: Right foot pain  Assessment and Plan of Treatment: xray showed There is a fracture of the fifth right metatarsal which could be old. please consult foot doctor if pain worsens. as you  mentioned chronic pain, could be old fracture.        PRINCIPAL DISCHARGE DIAGNOSIS  Diagnosis: CVA (cerebral vascular accident)  Assessment and Plan of Treatment: you were found to have recurrent stroke. CT angio brain reported bad narrowing limiting blood flow which could be contributing. stroke team in [facility] suggested  that this needs to be fixed to prevent furhter stroke. Dr. Stevie Adrian [ stroke neurologist] accepted you there. continue meds. fall, aspiration, seizure precautions.      SECONDARY DISCHARGE DIAGNOSES  Diagnosis: Tachy-ashley syndrome  Assessment and Plan of Treatment: your heart rate at rest around 55 beats per min. goes upto 150s when you walked short distance. heart doctor saw you. suggested long term cardiac monitor called ILR. you can speak to heart doctor at [facility] and have one there. discuss with your neurology team    Diagnosis: DM (diabetes mellitus)  Assessment and Plan of Treatment: we held your home meds. resume on discharge    Diagnosis: HTN (hypertension)  Assessment and Plan of Treatment: we held your medicine for stroke. your stroke team will monitor blood pressure and resume meds if needed    Diagnosis: Vaginal bleeding, abnormal  Assessment and Plan of Treatment: vaginal ultrasound was limited. reported myomatous uterus. radiologist recommended further study if indictaed like hysteroscopy or MRI. please consult your GYN doctor. .    Diagnosis: Right foot pain  Assessment and Plan of Treatment: xray showed There is a fracture of the fifth right metatarsal which could be old. please consult foot doctor if pain worsens. as you  mentioned chronic pain, could be old fracture.       Diagnosis: Abnormal thyroid blood test  Assessment and Plan of Treatment: slightly abnormal thyroid test. needs repeat in 4 weeks when she is feeling fine

## 2024-05-24 NOTE — SWALLOW BEDSIDE ASSESSMENT ADULT - SLP PERTINENT HISTORY OF CURRENT PROBLEM
64 y/o F with PMH of HTN, T2DM,  h/o Left MCA stroke in Oct 2023 and in March 2024 with right facial droop and right sided hemiparesis, referred by her radiologist after MRI of brain done today showed new stroke. As per patient's partner Connie, pt was complaining of right leg weakness and pain for past 2 days. Of note, this was her 6 month f/u MRI , which she got today and found that she has new acute CVA in left MCA territory. pt was brought to the ED. As per Connie, patient's family history is unknown. per Connie, patient also erazo not have any heart rhythm issues. It is unclear if there is hypercoagualable disorders in patient's family.

## 2024-05-24 NOTE — SWALLOW BEDSIDE ASSESSMENT ADULT - SWALLOW EVAL: FUNCTIONAL LEVEL AT TIME OF EVAL
Pt found out of bed in chair with evident non fluent aphasia with fair communication using gestures and personal communication board at bedside. Pt's partner, Connie, present for evaluation and reports baseline aphasia that appears mildly worsened.

## 2024-05-24 NOTE — DISCHARGE NOTE PROVIDER - NSDCMRMEDTOKEN_GEN_ALL_CORE_FT
amLODIPine 10 mg oral tablet: 1 tab(s) orally once a day  aspirin 81 mg oral delayed release tablet: 1 tab(s) orally once a day  atorvastatin 80 mg oral tablet: 1 tab(s) orally once a day (at bedtime)  bisacodyl 10 mg rectal suppository: 1 suppository(ies) rectal once as needed for  constipation  clopidogrel 75 mg oral tablet: 1 tab(s) orally once a day  donepezil 5 mg oral tablet: 1 tab(s) orally once a day  lisinopril 20 mg oral tablet: 1 tab(s) orally once a day  melatonin 3 mg oral tablet: 1 tab(s) orally once a day (at bedtime) As needed Insomnia  metFORMIN 500 mg oral tablet: 1 tab(s) orally 2 times a day (with meals)  Multiple Vitamins oral tablet: 1 tab(s) orally once a day  polyethylene glycol 3350 oral powder for reconstitution: 17 gram(s) orally 2 times a day  senna leaf extract oral tablet: 2 tab(s) orally once a day (at bedtime)  tiZANidine 2 mg oral tablet: 1 tab(s) orally every 8 hours As needed Muscle Spasm

## 2024-05-24 NOTE — OCCUPATIONAL THERAPY INITIAL EVALUATION ADULT - ASSISTIVE DEVICE:SUPINE/SIT, REHAB EVAL
bed rails
Patient is scheduled for left shoulder arthroscopy on 2/9/17.Patient is at moderate risk for this surgery.

## 2024-05-24 NOTE — CONSULT NOTE ADULT - NS ATTEND AMEND GEN_ALL_CORE FT
-  -  63-year-old woman admitted with concern for possible new CVA.  Cardiology consulted today due to transiently elevated heart rate while walking to bathroom.  Status post prior CVA with residual deficits of right-sided weakness and aphasia.  As per discussion with patient's partner Connie, who is at bedside, no prior cardiac history.  On physical examination, blood pressure elevated.  She is euvolemic.  No significant cardiac murmurs rubs or gallops are noted.  EKG is sinus rhythm.  Telemetry demonstrating sinus rhythm, transient sinus tachycardia with heart rates between 130-150.  Echocardiogram demonstrating normal left ventricular systolic function, no significant valvular or pericardial disease.  Suspect intermittent tachycardia with activity is secondary to deconditioning.    Recommendations  -Continue telemetry and monitor heart rate while in hospital.  -Encourage p.o. intake.  -Continue dual antiplatelet therapy as per neuro.  -Continue high intensity statin.  -Resume home anti hypertensives (amlodipine and lisinopril) when OK with neurology   -Patient can be considered for long-term ambulatory heart monitoring with ILR postdischarge.  -I will sign out for cardiology to follow along tomorrow.  -Discussed with patient and with primary team.

## 2024-05-24 NOTE — DISCHARGE NOTE PROVIDER - CARE PROVIDER_API CALL
Lan Zapata  Neurology  [address] [place] [address], [address]  [place] [address]  Phone: [phone]  Fax: [phone]  Follow Up Time:

## 2024-05-24 NOTE — CONSULT NOTE ADULT - ASSESSMENT
Male history of HTN, DM, CVA in October 2023 with residual deficits, found to have new acute CVA.  No cardiac complaints.  Cardiology consulted tachycardia during exertion    Recommendations  EKG sinus rhythm, no ischemia  TTE with EF 60 to 65% and no severe valvular disease  Patient on telemetry monitoring, continue to montior.  General trend sinus 60 to 70 bpm, noted to be 120s on activity  TSH mildly decreased, follow-up T3-T4  can be secondary to deconditioning +/- hyperthyroid  
1. PT- bed mobility,transfers, gait and balance training  2. OT- ADL'S  3. CVA-high dose statin, statin, Asa/Plavix   4. Spasticity causing right wrist pain, ankle pain - start Tizanidine 2 mg q8h ( LFT's normal), monitor for sedation   4. Patient would benefit from acute rehab, needs a multidisciplinary team including PT, OT and speech. Can tolerate 3 hours of therapy a day.  CAROLYNN 21 days   Will follow.

## 2024-05-24 NOTE — SWALLOW BEDSIDE ASSESSMENT ADULT - SLP GENERAL OBSERVATIONS
Pt unable to approximate oral postures for oral musculature exam due to apparent oral apraxia. Informal assessment performed.

## 2024-05-24 NOTE — OCCUPATIONAL THERAPY INITIAL EVALUATION ADULT - PERTINENT HX OF CURRENT PROBLEM, REHAB EVAL
64 y/o F with PMH of HTN, T2DM,  h/o Left MCA stroke in Oct 2023 with right facial droop and right sided hemiparesis, referred by her radiologist after MRI of brain done today showed new stroke. As per patient's partner Connie, pt was complaining of right leg weakness and pain for past 2 days. Of note, this was her 6 month f/u MRI , which she got today and found that she has new acute CVA in left MCA territory. pt was brought to the ED. As per Connie, patient's family history is unknown. per Connie, patient also erazo not have any heart rhythm issues. It is unclear if there is hypercoagualable disorders in patient's family.

## 2024-05-24 NOTE — SPEECH LANGUAGE PATHOLOGY EVALUATION - SLP DIAGNOSIS
Pt presents with moderate non fluent aphasia with mild comprehension deficits and moderate expressive deficits, oral and verbal apraxia and moderate dysarthria with fair intelligibility. Comprehension breaks down at the semi-complex level. Verbal Pt presents with moderate non fluent aphasia with mild comprehension deficits and moderate expressive deficits, oral and verbal apraxia and moderate dysarthria with fair intelligibility. Comprehension breaks down at the semi-complex sentence level. Verbal expression deficits are marked by minimal verbal output, however, able to be elicited using carrier phrases, phonemic cues and melodic intonation. Pt noted with adequate ability to convey wants and needs via gestures.

## 2024-05-24 NOTE — CHART NOTE - NSCHARTNOTEFT_GEN_A_CORE
Discussed case with Stroke Fellow Dr Adrian Andrews. MRI reveals new left MCA distribution infarct. Patient has been on ASA/Plavix since L MCA infarct in February and known to have severe left M2 stenosis. Plan was to transfer for [facility] for conventional angiogram with possible therapeutic left mca stent. Patient instead wants to be transferred to [facility] and has shes arranged for transfer with an accepting physician.

## 2024-09-25 NOTE — PATIENT PROFILE ADULT - HISTORY OF COVID-19 VACCINATION
DISCHARGE INSTRUCTIONS     As part of your transition home, we are providing you with this set of discharge instructions, as a guide to help you in that process. In addition to the care provided during your hospital stay, there may be upcoming clinic visits, laboratory appointments, and/or results noted on this After Visit Summary (AVS).     To assist the physicians caring for you during this transition, we would like you remind you of the followin. Please call a Provider for help if you experience any of the following: Any questions or concerns, Chills, Constipation for more than 48 hours, Difficulty breathing, headache, or visual disturbances, Inability to eat, drink, or take medication, Increased confusion, Pain not relieved by medication, Persistent nausea or vomiting, and Temperature greater than 100.4 degrees Fahrenheit (38 degrees Celsius)  2. Activity Instructions: Normal activity as tolerated  3. Dressing/Wound Instructions: Not applicable  4. Lifting & Weight-bearing Instructions: No restrictions  5. Diet: Return to previous diet  6. No changes have been made to medication.     Follow up in clinic on  for labs, plan is for Admission next week on 10/1 for Cycle 1, day 8.     Please follow up with your cardiologist outpatient to address your chest pressure.     If you have any questions 24-48 hours after discharge, please feel free to contact the hospital unit/department you were discharged from.      Vaccine status unknown

## 2025-08-15 ENCOUNTER — OFFICE (OUTPATIENT)
Dept: URBAN - METROPOLITAN AREA CLINIC 104 | Facility: CLINIC | Age: 65
Setting detail: OPHTHALMOLOGY
End: 2025-08-15
Payer: MEDICAID

## 2025-08-15 DIAGNOSIS — I63.50: ICD-10-CM

## 2025-08-15 DIAGNOSIS — H25.13: ICD-10-CM

## 2025-08-15 PROCEDURE — 92004 COMPRE OPH EXAM NEW PT 1/>: CPT | Performed by: OPHTHALMOLOGY

## 2025-08-15 ASSESSMENT — VISUAL ACUITY
OS_BCVA: 20/
OD_BCVA: 20/

## 2025-08-15 ASSESSMENT — KERATOMETRY
OS_K2POWER_DIOPTERS: 46.55
OS_AXISANGLE_DEGREES: 36
OS_K1POWER_DIOPTERS: 45.67
OD_AXISANGLE_DEGREES: 105
OD_K1POWER_DIOPTERS: 45.79
OD_K2POWER_DIOPTERS: 45.86

## 2025-08-15 ASSESSMENT — REFRACTION_CURRENTRX
OD_AXIS: 82
OS_AXIS: 90
OD_ADD: +2.50
OS_OVR_VA: 20/
OD_OVR_VA: 20/
OD_SPHERE: +2.75
OS_SPHERE: +2.50
OS_ADD: +2.50
OD_CYLINDER: -1.00
OS_CYLINDER: -2.25

## 2025-08-15 ASSESSMENT — CONFRONTATIONAL VISUAL FIELD TEST (CVF)
OS_FINDINGS: FULL
OS_COMMENTS: POOR RESPONSE
OD_FINDINGS: FULL
OD_COMMENTS: POOR RESPONSE

## 2025-08-15 ASSESSMENT — REFRACTION_AUTOREFRACTION
OS_CYLINDER: -0.75
OS_SPHERE: +1.75
OD_CYLINDER: -1.25
OD_AXIS: 87
OS_AXIS: 113
OD_SPHERE: +3.25